# Patient Record
Sex: MALE | Race: WHITE | Employment: OTHER | URBAN - METROPOLITAN AREA
[De-identification: names, ages, dates, MRNs, and addresses within clinical notes are randomized per-mention and may not be internally consistent; named-entity substitution may affect disease eponyms.]

---

## 2020-09-23 ENCOUNTER — HOSPITAL ENCOUNTER (INPATIENT)
Age: 70
LOS: 1 days | Discharge: NURSING, REHAB OR LONG-TERM CARE | DRG: 066 | End: 2020-09-25
Attending: EMERGENCY MEDICINE | Admitting: FAMILY MEDICINE
Payer: MEDICARE

## 2020-09-23 ENCOUNTER — APPOINTMENT (OUTPATIENT)
Dept: CT IMAGING | Age: 70
DRG: 066 | End: 2020-09-23
Payer: MEDICARE

## 2020-09-23 LAB
ALBUMIN SERPL-MCNC: 3.5 G/DL (ref 3.5–5.2)
ALP BLD-CCNC: 113 U/L (ref 40–129)
ALT SERPL-CCNC: 19 U/L (ref 0–40)
ANION GAP SERPL CALCULATED.3IONS-SCNC: 9 MMOL/L (ref 7–16)
AST SERPL-CCNC: 14 U/L (ref 0–39)
BASOPHILS ABSOLUTE: 0.07 E9/L (ref 0–0.2)
BASOPHILS RELATIVE PERCENT: 0.9 % (ref 0–2)
BILIRUB SERPL-MCNC: 0.6 MG/DL (ref 0–1.2)
BUN BLDV-MCNC: 27 MG/DL (ref 8–23)
CALCIUM SERPL-MCNC: 8.9 MG/DL (ref 8.6–10.2)
CHLORIDE BLD-SCNC: 104 MMOL/L (ref 98–107)
CO2: 27 MMOL/L (ref 22–29)
CREAT SERPL-MCNC: 1.1 MG/DL (ref 0.7–1.2)
EKG ATRIAL RATE: 57 BPM
EKG P AXIS: 65 DEGREES
EKG P-R INTERVAL: 220 MS
EKG Q-T INTERVAL: 454 MS
EKG QRS DURATION: 150 MS
EKG QTC CALCULATION (BAZETT): 441 MS
EKG R AXIS: -43 DEGREES
EKG T AXIS: 1 DEGREES
EKG VENTRICULAR RATE: 57 BPM
EOSINOPHILS ABSOLUTE: 0.2 E9/L (ref 0.05–0.5)
EOSINOPHILS RELATIVE PERCENT: 2.4 % (ref 0–6)
GFR AFRICAN AMERICAN: >60
GFR NON-AFRICAN AMERICAN: >60 ML/MIN/1.73
GLUCOSE BLD-MCNC: 144 MG/DL (ref 74–99)
HCT VFR BLD CALC: 39.2 % (ref 37–54)
HEMOGLOBIN: 12.1 G/DL (ref 12.5–16.5)
IMMATURE GRANULOCYTES #: 0.03 E9/L
IMMATURE GRANULOCYTES %: 0.4 % (ref 0–5)
LYMPHOCYTES ABSOLUTE: 1.71 E9/L (ref 1.5–4)
LYMPHOCYTES RELATIVE PERCENT: 20.9 % (ref 20–42)
MCH RBC QN AUTO: 26.3 PG (ref 26–35)
MCHC RBC AUTO-ENTMCNC: 30.9 % (ref 32–34.5)
MCV RBC AUTO: 85.2 FL (ref 80–99.9)
METER GLUCOSE: 189 MG/DL (ref 74–99)
METER GLUCOSE: 273 MG/DL (ref 74–99)
MONOCYTES ABSOLUTE: 0.64 E9/L (ref 0.1–0.95)
MONOCYTES RELATIVE PERCENT: 7.8 % (ref 2–12)
NEUTROPHILS ABSOLUTE: 5.53 E9/L (ref 1.8–7.3)
NEUTROPHILS RELATIVE PERCENT: 67.6 % (ref 43–80)
PDW BLD-RTO: 14.9 FL (ref 11.5–15)
PLATELET # BLD: 222 E9/L (ref 130–450)
PMV BLD AUTO: 10.3 FL (ref 7–12)
POTASSIUM SERPL-SCNC: 5.1 MMOL/L (ref 3.5–5)
RBC # BLD: 4.6 E12/L (ref 3.8–5.8)
SODIUM BLD-SCNC: 140 MMOL/L (ref 132–146)
TOTAL PROTEIN: 6.2 G/DL (ref 6.4–8.3)
TROPONIN: <0.01 NG/ML (ref 0–0.03)
WBC # BLD: 8.2 E9/L (ref 4.5–11.5)

## 2020-09-23 PROCEDURE — 99284 EMERGENCY DEPT VISIT MOD MDM: CPT

## 2020-09-23 PROCEDURE — 70450 CT HEAD/BRAIN W/O DYE: CPT

## 2020-09-23 PROCEDURE — 85025 COMPLETE CBC W/AUTO DIFF WBC: CPT

## 2020-09-23 PROCEDURE — G0378 HOSPITAL OBSERVATION PER HR: HCPCS

## 2020-09-23 PROCEDURE — 6370000000 HC RX 637 (ALT 250 FOR IP): Performed by: FAMILY MEDICINE

## 2020-09-23 PROCEDURE — 99285 EMERGENCY DEPT VISIT HI MDM: CPT

## 2020-09-23 PROCEDURE — 80053 COMPREHEN METABOLIC PANEL: CPT

## 2020-09-23 PROCEDURE — 82962 GLUCOSE BLOOD TEST: CPT

## 2020-09-23 PROCEDURE — 84484 ASSAY OF TROPONIN QUANT: CPT

## 2020-09-23 PROCEDURE — 93005 ELECTROCARDIOGRAM TRACING: CPT | Performed by: STUDENT IN AN ORGANIZED HEALTH CARE EDUCATION/TRAINING PROGRAM

## 2020-09-23 PROCEDURE — 6370000000 HC RX 637 (ALT 250 FOR IP): Performed by: STUDENT IN AN ORGANIZED HEALTH CARE EDUCATION/TRAINING PROGRAM

## 2020-09-23 RX ORDER — POLYVINYL ALCOHOL 14 MG/ML
1 SOLUTION/ DROPS OPHTHALMIC PRN
COMMUNITY
End: 2021-08-10

## 2020-09-23 RX ORDER — DEXTROSE MONOHYDRATE 25 G/50ML
12.5 INJECTION, SOLUTION INTRAVENOUS PRN
Status: DISCONTINUED | OUTPATIENT
Start: 2020-09-23 | End: 2020-09-25 | Stop reason: HOSPADM

## 2020-09-23 RX ORDER — ISOSORBIDE MONONITRATE 60 MG/1
60 TABLET, EXTENDED RELEASE ORAL DAILY
Status: DISCONTINUED | OUTPATIENT
Start: 2020-09-23 | End: 2020-09-25 | Stop reason: HOSPADM

## 2020-09-23 RX ORDER — VITS A,C,E/LUTEIN/MINERALS 300MCG-200
1 TABLET ORAL DAILY
Status: DISCONTINUED | OUTPATIENT
Start: 2020-09-23 | End: 2020-09-25 | Stop reason: HOSPADM

## 2020-09-23 RX ORDER — MONTELUKAST SODIUM 10 MG/1
10 TABLET ORAL NIGHTLY
Status: DISCONTINUED | OUTPATIENT
Start: 2020-09-23 | End: 2020-09-25 | Stop reason: HOSPADM

## 2020-09-23 RX ORDER — LISINOPRIL 10 MG/1
10 TABLET ORAL DAILY
Status: ON HOLD | COMMUNITY
End: 2020-12-15 | Stop reason: HOSPADM

## 2020-09-23 RX ORDER — DEXTROSE MONOHYDRATE 50 MG/ML
100 INJECTION, SOLUTION INTRAVENOUS PRN
Status: DISCONTINUED | OUTPATIENT
Start: 2020-09-23 | End: 2020-09-25 | Stop reason: HOSPADM

## 2020-09-23 RX ORDER — ERGOCALCIFEROL 1.25 MG/1
50000 CAPSULE ORAL
Status: DISCONTINUED | OUTPATIENT
Start: 2020-09-24 | End: 2020-09-25 | Stop reason: HOSPADM

## 2020-09-23 RX ORDER — CITALOPRAM 10 MG/1
10 TABLET ORAL DAILY
COMMUNITY
End: 2021-08-10

## 2020-09-23 RX ORDER — ATORVASTATIN CALCIUM 20 MG/1
20 TABLET, FILM COATED ORAL NIGHTLY
Status: DISCONTINUED | OUTPATIENT
Start: 2020-09-23 | End: 2020-09-25 | Stop reason: HOSPADM

## 2020-09-23 RX ORDER — INSULIN GLARGINE 100 [IU]/ML
43 INJECTION, SOLUTION SUBCUTANEOUS NIGHTLY
Status: ON HOLD | COMMUNITY
End: 2021-03-24 | Stop reason: HOSPADM

## 2020-09-23 RX ORDER — LEVOTHYROXINE SODIUM 175 UG/1
175 TABLET ORAL DAILY
Status: DISCONTINUED | OUTPATIENT
Start: 2020-09-23 | End: 2020-09-25 | Stop reason: HOSPADM

## 2020-09-23 RX ORDER — NICOTINE POLACRILEX 4 MG
15 LOZENGE BUCCAL PRN
Status: DISCONTINUED | OUTPATIENT
Start: 2020-09-23 | End: 2020-09-25 | Stop reason: HOSPADM

## 2020-09-23 RX ORDER — ACETAMINOPHEN 325 MG/1
650 TABLET ORAL EVERY 4 HOURS PRN
Status: DISCONTINUED | OUTPATIENT
Start: 2020-09-23 | End: 2020-09-25 | Stop reason: HOSPADM

## 2020-09-23 RX ORDER — PREDNISONE 20 MG/1
60 TABLET ORAL ONCE
Status: COMPLETED | OUTPATIENT
Start: 2020-09-23 | End: 2020-09-23

## 2020-09-23 RX ORDER — FAMOTIDINE 20 MG/1
20 TABLET, FILM COATED ORAL 2 TIMES DAILY
Status: ON HOLD | COMMUNITY
End: 2020-12-07

## 2020-09-23 RX ORDER — PANTOPRAZOLE SODIUM 40 MG/1
40 TABLET, DELAYED RELEASE ORAL
Status: DISCONTINUED | OUTPATIENT
Start: 2020-09-24 | End: 2020-09-25 | Stop reason: HOSPADM

## 2020-09-23 RX ORDER — CARVEDILOL 6.25 MG/1
6.25 TABLET ORAL 2 TIMES DAILY WITH MEALS
Status: DISCONTINUED | OUTPATIENT
Start: 2020-09-23 | End: 2020-09-25 | Stop reason: HOSPADM

## 2020-09-23 RX ORDER — OXYCODONE HYDROCHLORIDE AND ACETAMINOPHEN 5; 325 MG/1; MG/1
1 TABLET ORAL EVERY 4 HOURS PRN
Status: DISCONTINUED | OUTPATIENT
Start: 2020-09-23 | End: 2020-09-25 | Stop reason: HOSPADM

## 2020-09-23 RX ADMIN — ISOSORBIDE MONONITRATE 60 MG: 60 TABLET ORAL at 18:20

## 2020-09-23 RX ADMIN — CYCLOPENTOLATE HYDROCHLORIDE 1 TABLET: 10 SOLUTION/ DROPS OPHTHALMIC at 18:21

## 2020-09-23 RX ADMIN — PREDNISONE 60 MG: 20 TABLET ORAL at 12:59

## 2020-09-23 RX ADMIN — ASPIRIN 325 MG: 325 TABLET, COATED ORAL at 18:21

## 2020-09-23 RX ADMIN — MONTELUKAST SODIUM 10 MG: 10 TABLET, COATED ORAL at 21:18

## 2020-09-23 RX ADMIN — LEVOTHYROXINE SODIUM 175 MCG: 0.17 TABLET ORAL at 18:20

## 2020-09-23 RX ADMIN — INSULIN LISPRO 1 UNITS: 100 INJECTION, SOLUTION INTRAVENOUS; SUBCUTANEOUS at 18:26

## 2020-09-23 RX ADMIN — SERTRALINE 75 MG: 50 TABLET, FILM COATED ORAL at 18:20

## 2020-09-23 RX ADMIN — ATORVASTATIN CALCIUM 20 MG: 20 TABLET, FILM COATED ORAL at 21:22

## 2020-09-23 RX ADMIN — CARVEDILOL 6.25 MG: 6.25 TABLET, FILM COATED ORAL at 18:20

## 2020-09-23 RX ADMIN — INSULIN LISPRO 2 UNITS: 100 INJECTION, SOLUTION INTRAVENOUS; SUBCUTANEOUS at 21:18

## 2020-09-23 ASSESSMENT — ENCOUNTER SYMPTOMS
EYE PAIN: 0
FACIAL SWELLING: 0
COUGH: 0
EYE REDNESS: 0
DIARRHEA: 0
SHORTNESS OF BREATH: 0
VOMITING: 0
SORE THROAT: 0
ABDOMINAL PAIN: 0
BACK PAIN: 0
NAUSEA: 0
CHEST TIGHTNESS: 0
PHOTOPHOBIA: 0
WHEEZING: 0

## 2020-09-23 ASSESSMENT — PAIN SCALES - GENERAL: PAINLEVEL_OUTOF10: 0

## 2020-09-23 NOTE — PROGRESS NOTES
Neurology consult sent to Dr. Verona Keen via perfect serve. Physician added to care team at this time.

## 2020-09-23 NOTE — ED PROVIDER NOTES
Patient is a 64-year-old male with a history of CAD, hyperlipidemia, COPD, diabetes who presents the emergency department for evaluation of right-sided facial droop. Facial droop began 2 days ago. Came on suddenly. Nothing seems to make it better or worse. No medications taken prior to arrival and has not been seen by anyone for it. Patient states that it is been constant and moderate in severity. He states it feels somewhat similar to Bell's palsy had 10 years ago. He denies difficulty finding words, headache, change in vision, slurred speech, chest pain, shortness of breath, nausea, vomiting, abdominal pain, weakness any tremors, numbness or tingling extremities. The history is provided by the patient. Review of Systems   Constitutional: Negative for chills, diaphoresis, fatigue and fever. HENT: Negative for facial swelling and sore throat. Eyes: Negative for photophobia, pain, redness and visual disturbance. Respiratory: Negative for cough, chest tightness, shortness of breath and wheezing. Cardiovascular: Negative for chest pain. Gastrointestinal: Negative for abdominal pain, diarrhea, nausea and vomiting. Genitourinary: Negative for decreased urine volume, dysuria, flank pain, frequency and urgency. Musculoskeletal: Negative for arthralgias, back pain and myalgias. Skin: Negative for pallor, rash and wound. Neurological: Positive for facial asymmetry. Negative for dizziness, syncope, weakness, light-headedness and headaches. All other systems reviewed and are negative. Physical Exam  Vitals signs and nursing note reviewed. Constitutional:       General: He is not in acute distress. Appearance: He is not ill-appearing. HENT:      Head: Atraumatic. 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  He denies any trauma to his hand.      Mouth/Throat:      Mouth: Mucous membranes are moist.   Eyes:      Extraocular Movements: Extraocular movements intact. Pupils: Pupils are equal, round, and reactive to light. Cardiovascular:      Rate and Rhythm: Normal rate and regular rhythm. Pulses: Normal pulses. Heart sounds: Normal heart sounds. Pulmonary:      Effort: Pulmonary effort is normal. No respiratory distress. Breath sounds: Normal breath sounds. No wheezing or rhonchi. Abdominal:      General: Abdomen is flat. There is no distension. Palpations: Abdomen is soft. Tenderness: There is no abdominal tenderness. There is no guarding or rebound. Musculoskeletal:         General: No swelling or tenderness. Skin:     General: Skin is warm and dry. Neurological:      Mental Status: He is alert and oriented to person, place, and time. Cranial Nerves: Cranial nerve deficit (Right-sided facial droop and inability to close the right eye.) present. Sensory: No sensory deficit. Motor: No weakness. Coordination: Coordination normal.          Procedures     MDM  Number of Diagnoses or Management Options  Abnormal CT of the head:   Bell's palsy:   Diagnosis management comments: Patient is a 41-year-old male that presents emergency room for evaluation of facial droop. Approximately no apparent distress on exam.  He does have right-sided facial droop as well as inability to close the right eye. Mild wrinkling of the forehead on eyebrow raise on the right. This is likely Bell's palsy however due to that finding CT scan of the head was performed and blood work was performed. Blood work was unremarkable. CT scan of the head showed hypodensity in the occipital region which would not explain the facial droop. They did recommend MRI to further evaluate of it. Spoke with hospitalist who agreed to accept patient. Patient was given prednisone for likely Bell's palsy. He remained hemodynamically stable throughout stay in the emergency room.             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 Past Surgical History:  has a past surgical history that includes Cardiac surgery; Coronary angioplasty with stent (12/2011); knee surgery; other surgical history (Left, 2/11/2015); other surgical history (3/2/15); other surgical history (Left, 3/2/2015); and eye surgery (Right, 8-). Social History:  reports that he has been smoking cigarettes. He has a 22.00 pack-year smoking history. He has never used smokeless tobacco. He reports that he does not drink alcohol or use drugs. Family History: family history includes Cancer in his mother; Heart Disease in his father. The patients home medications have been reviewed. Allergies: Patient has no known allergies.     -------------------------------------------------- RESULTS -------------------------------------------------    LABS:  Results for orders placed or performed during the hospital encounter of 09/23/20   CBC Auto Differential   Result Value Ref Range    WBC 8.2 4.5 - 11.5 E9/L    RBC 4.60 3.80 - 5.80 E12/L    Hemoglobin 12.1 (L) 12.5 - 16.5 g/dL    Hematocrit 39.2 37.0 - 54.0 %    MCV 85.2 80.0 - 99.9 fL    MCH 26.3 26.0 - 35.0 pg    MCHC 30.9 (L) 32.0 - 34.5 %    RDW 14.9 11.5 - 15.0 fL    Platelets 539 659 - 746 E9/L    MPV 10.3 7.0 - 12.0 fL    Neutrophils % 67.6 43.0 - 80.0 %    Immature Granulocytes % 0.4 0.0 - 5.0 %    Lymphocytes % 20.9 20.0 - 42.0 %    Monocytes % 7.8 2.0 - 12.0 %    Eosinophils % 2.4 0.0 - 6.0 %    Basophils % 0.9 0.0 - 2.0 %    Neutrophils Absolute 5.53 1.80 - 7.30 E9/L    Immature Granulocytes # 0.03 E9/L    Lymphocytes Absolute 1.71 1.50 - 4.00 E9/L    Monocytes Absolute 0.64 0.10 - 0.95 E9/L    Eosinophils Absolute 0.20 0.05 - 0.50 E9/L    Basophils Absolute 0.07 0.00 - 0.20 E9/L   Comprehensive Metabolic Panel   Result Value Ref Range    Sodium 140 132 - 146 mmol/L    Potassium 5.1 (H) 3.5 - 5.0 mmol/L    Chloride 104 98 - 107 mmol/L    CO2 27 22 - 29 mmol/L    Anion Gap 9 7 - 16 mmol/L    Glucose 144 (H) 74 - 99 mg/dL    BUN 27 (H) 8 - 23 mg/dL    CREATININE 1.1 0.7 - 1.2 mg/dL    GFR Non-African American >60 >=60 mL/min/1.73    GFR African American >60     Calcium 8.9 8.6 - 10.2 mg/dL    Total Protein 6.2 (L) 6.4 - 8.3 g/dL    Alb 3.5 3.5 - 5.2 g/dL    Total Bilirubin 0.6 0.0 - 1.2 mg/dL    Alkaline Phosphatase 113 40 - 129 U/L    ALT 19 0 - 40 U/L    AST 14 0 - 39 U/L   Troponin   Result Value Ref Range    Troponin <0.01 0.00 - 0.03 ng/mL       RADIOLOGY:  CT HEAD WO CONTRAST   Final Result      Small hypodensity in the left occipital lobe may represent edema or   encephalomalacia. Further evaluation with MRI is recommended. EKG: This EKG is signed and interpreted by me. Rate: 57  Rhythm: Sinus  Interpretation: Sinus bradycardia with first-degree AV block and a right bundle branch block. Comparison: changes compared to previous EKG      ------------------------- NURSING NOTES AND VITALS REVIEWED ---------------------------  Date / Time Roomed:  9/23/2020 10:56 AM  ED Bed Assignment:  01/01    The nursing notes within the ED encounter and vital signs as below have been reviewed.      Patient Vitals for the past 24 hrs:   BP Temp Pulse Resp SpO2   09/23/20 1134 -- -- -- 19 98 %   09/23/20 1131 (!) 113/36 -- 60 -- --   09/23/20 1110 (!) 161/63 97.3 °F (36.3 °C) 60 17 99 %       Oxygen Saturation Interpretation: Normal    ------------------------------------------ PROGRESS NOTES ------------------------------------------  Re-evaluation(s):  Time: 1200  Patients symptoms show no change  Repeat physical examination is not changed    Counseling:  I have spoken with the patient and discussed todays results, in addition to providing specific details for the plan of care and counseling regarding the diagnosis and prognosis. Their questions are answered at this time and they are agreeable with the plan of admission.    --------------------------------- ADDITIONAL PROVIDER NOTES ---------------------------------  Consultations:  Time: 1310. Spoke with Dr. Izabel Schwartz. Discussed case. They will admit the patient. This patient's ED course included: a personal history and physicial examination, re-evaluation prior to disposition, multiple bedside re-evaluations, IV medications, cardiac monitoring and continuous pulse oximetry    This patient has remained hemodynamically stable during their ED course. Diagnosis:  1. Bell's palsy    2. Abnormal CT of the head        Disposition:  Patient's disposition: Admit to med/surg floor  Patient's condition is stable.          Blanca Mcbride., DO  Resident  09/23/20 0919

## 2020-09-24 ENCOUNTER — APPOINTMENT (OUTPATIENT)
Dept: ULTRASOUND IMAGING | Age: 70
DRG: 066 | End: 2020-09-24
Payer: MEDICARE

## 2020-09-24 ENCOUNTER — APPOINTMENT (OUTPATIENT)
Dept: MRI IMAGING | Age: 70
DRG: 066 | End: 2020-09-24
Payer: MEDICARE

## 2020-09-24 PROBLEM — I63.9 STROKE (CEREBRUM) (HCC): Status: ACTIVE | Noted: 2020-09-24

## 2020-09-24 PROBLEM — R29.810 FACIAL DROOP: Status: ACTIVE | Noted: 2020-09-24

## 2020-09-24 PROBLEM — G51.0 BELL'S PALSY: Status: ACTIVE | Noted: 2020-09-24

## 2020-09-24 LAB
METER GLUCOSE: 174 MG/DL (ref 74–99)
METER GLUCOSE: 182 MG/DL (ref 74–99)
METER GLUCOSE: 184 MG/DL (ref 74–99)
METER GLUCOSE: 221 MG/DL (ref 74–99)
SARS-COV-2, NAAT: NOT DETECTED

## 2020-09-24 PROCEDURE — 2140000000 HC CCU INTERMEDIATE R&B

## 2020-09-24 PROCEDURE — 82962 GLUCOSE BLOOD TEST: CPT

## 2020-09-24 PROCEDURE — 6360000002 HC RX W HCPCS: Performed by: FAMILY MEDICINE

## 2020-09-24 PROCEDURE — 96374 THER/PROPH/DIAG INJ IV PUSH: CPT

## 2020-09-24 PROCEDURE — 97161 PT EVAL LOW COMPLEX 20 MIN: CPT

## 2020-09-24 PROCEDURE — 97530 THERAPEUTIC ACTIVITIES: CPT

## 2020-09-24 PROCEDURE — 97165 OT EVAL LOW COMPLEX 30 MIN: CPT

## 2020-09-24 PROCEDURE — 6370000000 HC RX 637 (ALT 250 FOR IP): Performed by: FAMILY MEDICINE

## 2020-09-24 PROCEDURE — U0002 COVID-19 LAB TEST NON-CDC: HCPCS

## 2020-09-24 PROCEDURE — 93880 EXTRACRANIAL BILAT STUDY: CPT

## 2020-09-24 PROCEDURE — 70551 MRI BRAIN STEM W/O DYE: CPT

## 2020-09-24 RX ORDER — LORAZEPAM 2 MG/ML
1 INJECTION INTRAMUSCULAR EVERY 4 HOURS PRN
Status: DISCONTINUED | OUTPATIENT
Start: 2020-09-24 | End: 2020-09-25 | Stop reason: HOSPADM

## 2020-09-24 RX ADMIN — SERTRALINE 75 MG: 50 TABLET, FILM COATED ORAL at 08:24

## 2020-09-24 RX ADMIN — LORAZEPAM 1 MG: 2 INJECTION INTRAMUSCULAR; INTRAVENOUS at 06:37

## 2020-09-24 RX ADMIN — INSULIN LISPRO 1 UNITS: 100 INJECTION, SOLUTION INTRAVENOUS; SUBCUTANEOUS at 08:23

## 2020-09-24 RX ADMIN — CARVEDILOL 6.25 MG: 6.25 TABLET, FILM COATED ORAL at 08:25

## 2020-09-24 RX ADMIN — INSULIN LISPRO 1 UNITS: 100 INJECTION, SOLUTION INTRAVENOUS; SUBCUTANEOUS at 21:56

## 2020-09-24 RX ADMIN — INSULIN LISPRO 10 UNITS: 100 INJECTION, SUSPENSION SUBCUTANEOUS at 22:06

## 2020-09-24 RX ADMIN — ISOSORBIDE MONONITRATE 60 MG: 60 TABLET ORAL at 08:25

## 2020-09-24 RX ADMIN — MONTELUKAST SODIUM 10 MG: 10 TABLET, COATED ORAL at 21:45

## 2020-09-24 RX ADMIN — ERGOCALCIFEROL 50000 UNITS: 1.25 CAPSULE ORAL at 08:25

## 2020-09-24 RX ADMIN — ASPIRIN 325 MG: 325 TABLET, COATED ORAL at 08:30

## 2020-09-24 RX ADMIN — ATORVASTATIN CALCIUM 20 MG: 20 TABLET, FILM COATED ORAL at 21:45

## 2020-09-24 RX ADMIN — PANTOPRAZOLE SODIUM 40 MG: 40 TABLET, DELAYED RELEASE ORAL at 08:25

## 2020-09-24 RX ADMIN — CYCLOPENTOLATE HYDROCHLORIDE 1 TABLET: 10 SOLUTION/ DROPS OPHTHALMIC at 08:31

## 2020-09-24 RX ADMIN — INSULIN LISPRO 10 UNITS: 100 INJECTION, SUSPENSION SUBCUTANEOUS at 08:22

## 2020-09-24 RX ADMIN — LEVOTHYROXINE SODIUM 175 MCG: 0.17 TABLET ORAL at 06:37

## 2020-09-24 ASSESSMENT — PAIN SCALES - GENERAL: PAINLEVEL_OUTOF10: 0

## 2020-09-24 ASSESSMENT — ENCOUNTER SYMPTOMS
ABDOMINAL PAIN: 0
SHORTNESS OF BREATH: 0

## 2020-09-24 NOTE — CARE COORDINATION
9/24 Transition of Care: Per Rodriguez Border patient is ltc at [facility] under his [facility] and is a bed hold. The plan is to return at discharge. Envelope completed and placed in soft chart.  Brady Buckley RN   [phone]

## 2020-09-24 NOTE — PROCEDURES
Pt measures 31 inches across shoulders and is too large to fit in our closed mri scanner. Rn notified.

## 2020-09-24 NOTE — CONSULTS
History Of Present Illness: Patient is a 68-year-old male with a history of CAD, hyperlipidemia, COPD, diabetes who presents the emergency department for evaluation of right-sided facial droop. Facial droop began 2 days ago. Came on suddenly. Nothing seems to make it better or worse. No medications taken prior to arrival and has not been seen by anyone for it. Patient states that it is been constant and moderate in severity. He states it feels somewhat similar to Bell's palsy had 10 years ago. He denies difficulty finding words, headache, change in vision, slurred speech, chest pain, shortness of breath, nausea, vomiting, abdominal pain, weakness any tremors, numbness or tingling extremities. As above per ed staff. Patient is interviewed and reports facial droop for approximately 1 week. The patient is a 65 y.o. male with significant past medical history of see below who presents with above. The patient has the following symptoms:    Change in level of consciousness: alert    New Weakness: yes    Numbness or Tingling: no    Difficulty Swallowing: no    Current Medications:   Scheduled Meds:   aspirin  325 mg Oral Daily    atorvastatin  20 mg Oral Nightly    carvedilol  6.25 mg Oral BID WC    insulin lispro protamine & lispro  10 Units Subcutaneous BID WC    isosorbide mononitrate  60 mg Oral Daily    levothyroxine  175 mcg Oral Daily    montelukast  10 mg Oral Nightly    ocuvite-lutein  1 tablet Oral Daily    pantoprazole  40 mg Oral QAM AC    sertraline  75 mg Oral Daily    vitamin D  50,000 Units Oral Once per day on Thu    enoxaparin  40 mg Subcutaneous Daily    insulin lispro  0-6 Units Subcutaneous TID WC    insulin lispro  0-3 Units Subcutaneous Nightly     Continuous Infusions:   dextrose       PRN Meds:LORazepam, acetaminophen, magnesium hydroxide, oxyCODONE-acetaminophen, glucose, dextrose, glucagon (rDNA), dextrose    Allergies:  Patient has no known allergies.     Social History:   TOBACCO:   reports that he has been smoking cigarettes. He has a 22.00 pack-year smoking history. He has never used smokeless tobacco.  ETOH:   reports no history of alcohol use. Past Medical History:        Diagnosis Date    Arthritis     Asthma     CAD (coronary artery disease) 2001    FREDA to RCA    [facility]     Diabetes [facility]     Marcus's gangrene     Hyperlipidemia     Hypothyroidism     DEMARCUS (obstructive sleep apnea)     no cpap or bipap     Pneumonia 2/5/2015       Past Surgical History:        Procedure Laterality Date    CARDIAC SURGERY      CORONARY ANGIOPLASTY WITH STENT PLACEMENT  12/2011    EYE SURGERY Right 8-    right eye cataract extraction with intraocular lens  implant    KNEE SURGERY      OTHER SURGICAL HISTORY Left 2/11/2015    excision and debridement left groin and scrotom    OTHER SURGICAL HISTORY  3/2/15    perineal wound check    OTHER SURGICAL HISTORY Left 3/2/2015    incision and drainage of scrotal abscesses and left fortino abscess by Dr Fidelia Muñiz         Outside reports reviewed: ER records, historical medical records, lab reports and radiology reports. Patient's medications, allergies, past medical, surgical, social and family histories were reviewed and updated as appropriate. Review of Systems  A comprehensive review of systems was negative except for:       Objective:     Neuro exam 118/59; p 66 t 98  General: normal orientation and alertness. Cranial nerve testing was normal. Except mild right peripheral facial weakness; he is able to completely cover sclera on right with eye closing; visual fields full to confrontation  Funduscopic eye exam revealed not testable. Motor exam: 4/5. Deep tendon reflexes were absent bilaterally. Plantar responses were flexor bilaterally. Cerebellar exam noted finger to nose without dysmetria.   Sensation was decreased in the lower

## 2020-09-24 NOTE — H&P
HISTORY AND PHYSICAL             Date: 9/24/2020        Patient Name: Mely Wilcox     YOB: 1950      Age:  65 y.o. Chief Complaint     Chief Complaint   Patient presents with    Facial Droop     hx of bells palsy, unknown LKW       History Obtained From   patient    History of Present Illness   Patient presented with two days of left sided facial droop. He denies any speech change or other weakness. Past Medical History     Past Medical History:   Diagnosis Date    Arthritis     Asthma     CAD (coronary artery disease) 2001    FREDA to RCA    [facility]     [facility]     Marcus's gangrene     Hyperlipidemia     Hypothyroidism     DEMARCUS (obstructive sleep apnea)     no cpap or bipap     Pneumonia 2/5/2015        Past Surgical History     Past Surgical History:   Procedure Laterality Date    CARDIAC SURGERY      CORONARY ANGIOPLASTY WITH STENT PLACEMENT  12/2011    EYE SURGERY Right 8-    right eye cataract extraction with intraocular lens  implant    KNEE SURGERY      OTHER SURGICAL HISTORY Left 2/11/2015    excision and debridement left groin and scrotom    OTHER SURGICAL HISTORY  3/2/15    perineal wound check    OTHER SURGICAL HISTORY Left 3/2/2015    incision and drainage of scrotal abscesses and left fortino abscess by Dr Lanette Martinez        Medications Prior to Admission     Prior to Admission medications    Medication Sig Start Date End Date Taking?  Authorizing Provider   polyvinyl alcohol (LIQUIFILM TEARS) 1.4 % ophthalmic solution Place 1 drop into the left eye as needed   Yes Historical Provider, MD   citalopram (CELEXA) 10 MG tablet Take 10 mg by mouth daily   Yes Historical Provider, MD   famotidine (PEPCID) 20 MG tablet Take 20 mg by mouth 2 times daily   Yes Historical Provider, MD   insulin glargine (LANTUS) 100 UNIT/ML injection vial Inject 43 Units into the skin nightly   Yes Historical Provider, MD per tablet Take 1 tablet by mouth daily. Historical Provider, MD   montelukast (SINGULAIR) 10 MG tablet Take 1 tablet by mouth nightly. 6/7/14   Alicia Collins MD   pantoprazole (PROTONIX) 40 MG tablet Take 1 tablet by mouth every morning (before breakfast). 6/7/14   Alicia Collins MD        Allergies   Patient has no known allergies. Social History     Social History     Tobacco History     Smoking Status  Current Every Day Smoker Smoking Frequency  0.5 packs/day for 44 years (22 pk yrs) Smoking Tobacco Type  Cigarettes    Smokeless Tobacco Use  Never Used          Alcohol History     Alcohol Use Status  No          Drug Use     Drug Use Status  No          Sexual Activity     Sexually Active  Not Currently                Family History     Family History   Problem Relation Age of Onset    Cancer Mother     Heart Disease Father        Review of Systems   Review of Systems   Constitutional: Positive for activity change. HENT: Negative for congestion. Respiratory: Negative for shortness of breath. Cardiovascular: Negative for chest pain. Gastrointestinal: Negative for abdominal pain. Genitourinary: Negative for difficulty urinating. Musculoskeletal: Negative for arthralgias. Neurological: Positive for facial asymmetry. Negative for dizziness and weakness. Hematological: Negative for adenopathy. Psychiatric/Behavioral: Negative for agitation. Physical Exam   BP (!) 158/80   Pulse 58   Temp 97.2 °F (36.2 °C) (Temporal)   Resp 16   Ht 5' 7\" (1.702 m)   Wt 255 lb (115.7 kg)   SpO2 99%   BMI 39.94 kg/m²     Physical Exam  HENT:      Head: Normocephalic. Mouth/Throat:      Mouth: Mucous membranes are moist.   Eyes:      Pupils: Pupils are equal, round, and reactive to light. Cardiovascular:      Rate and Rhythm: Normal rate and regular rhythm. Pulses: Normal pulses. Heart sounds: Normal heart sounds.    Pulmonary:      Effort: Pulmonary effort is normal. No respiratory distress. Breath sounds: Normal breath sounds. No wheezing. Abdominal:      General: Abdomen is flat. Bowel sounds are normal. There is no distension. Tenderness: There is no abdominal tenderness. Musculoskeletal: Normal range of motion. Skin:     General: Skin is warm and dry. Capillary Refill: Capillary refill takes less than 2 seconds. Neurological:      Mental Status: He is alert and oriented to person, place, and time.       Comments: Left sided facial droop   Psychiatric:         Mood and Affect: Mood normal.         Behavior: Behavior normal.         Labs      Recent Results (from the past 24 hour(s))   EKG 12 Lead    Collection Time: 09/23/20 11:25 AM   Result Value Ref Range    Ventricular Rate 57 BPM    Atrial Rate 57 BPM    P-R Interval 220 ms    QRS Duration 150 ms    Q-T Interval 454 ms    QTc Calculation (Bazett) 441 ms    P Axis 65 degrees    R Axis -43 degrees    T Axis 1 degrees   CBC Auto Differential    Collection Time: 09/23/20 11:32 AM   Result Value Ref Range    WBC 8.2 4.5 - 11.5 E9/L    RBC 4.60 3.80 - 5.80 E12/L    Hemoglobin 12.1 (L) 12.5 - 16.5 g/dL    Hematocrit 39.2 37.0 - 54.0 %    MCV 85.2 80.0 - 99.9 fL    MCH 26.3 26.0 - 35.0 pg    MCHC 30.9 (L) 32.0 - 34.5 %    RDW 14.9 11.5 - 15.0 fL    Platelets 730 925 - 924 E9/L    MPV 10.3 7.0 - 12.0 fL    Neutrophils % 67.6 43.0 - 80.0 %    Immature Granulocytes % 0.4 0.0 - 5.0 %    Lymphocytes % 20.9 20.0 - 42.0 %    Monocytes % 7.8 2.0 - 12.0 %    Eosinophils % 2.4 0.0 - 6.0 %    Basophils % 0.9 0.0 - 2.0 %    Neutrophils Absolute 5.53 1.80 - 7.30 E9/L    Immature Granulocytes # 0.03 E9/L    Lymphocytes Absolute 1.71 1.50 - 4.00 E9/L    Monocytes Absolute 0.64 0.10 - 0.95 E9/L    Eosinophils Absolute 0.20 0.05 - 0.50 E9/L    Basophils Absolute 0.07 0.00 - 0.20 E9/L   Comprehensive Metabolic Panel    Collection Time: 09/23/20 11:32 AM   Result Value Ref Range    Sodium 140 132 - 146 mmol/L    Potassium 5.1

## 2020-09-24 NOTE — DISCHARGE INSTR - COC
Continuity of Care Form    Patient Name: Paris Caballero   :  1950  MRN:  [id number]    Admit date:  2020  Discharge date:  ***    Code Status Order: Prior   Advance Directives:   [facility] Documentation     Date/Time Healthcare Directive Type of Healthcare Directive Copy in [address] Agent's Name Healthcare Agent's Phone Number    [phone]  Yes, patient has an advance directive for healthcare treatment  Health care treatment directive; Living will  No, copy requested from [facility] power of [facility] ([facility])  [phone]          Admitting Physician:  Lydia Escobar MD  PCP: Enoch Hendricks MD    Discharging Nurse: [facility] Unit/Room#: 1368/0910-W  Discharging Unit Phone Number: ***    Emergency Contact:   Extended Emergency Contact Information  Primary Emergency Contact: Bertha Box  Address: Padmini Pablo [address] [place] [address] Phone: [phone]  Mobile Phone: [phone]  Relation: Brother/Sister  Secondary Emergency Contact: [facility] Halina Coley  Address:  [address] Phone: [phone]  Relation: Brother/Sister    Past Surgical History:  Past Surgical History:   Procedure Laterality Date    CARDIAC SURGERY      CORONARY ANGIOPLASTY WITH STENT PLACEMENT  2011    EYE SURGERY Right 2015    right eye cataract extraction with intraocular lens  implant    KNEE SURGERY      OTHER SURGICAL HISTORY Left 2015    excision and debridement left groin and scrotom    OTHER SURGICAL HISTORY  3/2/15    perineal wound check    OTHER SURGICAL HISTORY Left 3/2/2015    incision and drainage of scrotal abscesses and left fortino abscess by Dr Prabha Frazier       Immunization History: There is no immunization history on file for this patient.     Active Problems:  Patient Active Problem List   Diagnosis Code    CAD (coronary artery disease) I25.10    Diabetes mellitus ([facility] [address] E11.9    Hyperlipidemia E78.5    COPD (chronic obstructive pulmonary disease) ([facility]) J44.9    DEMARCUS (obstructive sleep apnea) G47.33    Acute respiratory distress R06.03    Tobacco abuse Z72.0    Acute bronchitis J20.9    Gastritis K29.70    IDDM (insulin dependent diabetes mellitus) ([facility]) E11.9, Z79.4    ASHD (arteriosclerotic heart disease) I25.10    COPD (chronic obstructive pulmonary disease) ([facility]) J44.9    Cellulitis L03.90    Gall Bladder sludge K80.20    COPD (chronic obstructive pulmonary disease) ([facility]) J44.9    ASHD (arteriosclerotic heart disease) I25.10    IDDM (insulin dependent diabetes mellitus) ([facility]) E11.9, Z79.4    Pneumonia J18.9    CHF (congestive heart failure) ([facility]) I50.9    Hematuria R31.9    IDDM (insulin dependent diabetes mellitus) ([facility]) E11.9, Z79.4    ASHD (arteriosclerotic heart disease) I25.10    COPD (chronic obstructive pulmonary disease) ([facility]) J44.9    Hypoglycemia E16.2    ASHD (arteriosclerotic heart disease) I25.10    IDDM (insulin dependent diabetes mellitus) ([facility]) E11.9, Z79.4    Stroke (cerebrum) ([facility]) I63.9    Bell's palsy G51.0       Isolation/Infection:   Isolation          No Isolation        Patient Infection Status     Infection Onset Added Last Indicated Last Indicated By Review Planned Expiration Resolved Resolved By    None active    Resolved    ESBL (Extended Spectrum Beta Lactamase)  04/06/15 04/06/15 Kanika Mcclellan RN   08/18/15 Kanika Mcclellan RN    4/2/15 Klebsiella Pneumoniae urine          Nurse Assessment:  Last Vital Signs: BP (!) 118/59   Pulse 66   Temp 97.2 °F (36.2 °C) (Temporal)   Resp 16   Ht 5' 7\" (1.702 m)   Wt 255 lb (115.7 kg)   SpO2 99%   BMI 39.94 kg/m²     Last documented pain score (0-10 scale): Pain Level: 0  Last Weight:   Wt Readings from Last 1 Encounters:   09/23/20 255 lb (115.7 kg)     Mental Status:  alert and coherent    IV Access:  - None    Nursing Mobility/ADLs:  Walking   Independent  Transfer  Independent  Bathing  Assisted  Dressing  Independent  [address]  Assisted  Med Delivery   whole    Wound Care Documentation and Therapy:  Wound 03/25/15 Other (Comment) Perineum Left (Active)   Number of days: 2010       Wound 03/25/15 Other (Comment) Scrotum Left;Right;Mid (Active)   Number of days: 2010        Elimination:  Continence:   · Bowel: No  · Bladder: No  Urinary Catheter: None   Colostomy/Ileostomy/Ileal Conduit: No       Date of Last BM: 9/25/20      Intake/Output Summary (Last 24 hours) at 9/24/2020 1308  Last data filed at 9/24/2020 0800  Gross per 24 hour   Intake 180 ml   Output --   Net 180 ml     No intake/output data recorded. Safety Concerns:     None    Impairments/Disabilities:      Speech    Nutrition Therapy:  Current Nutrition Therapy:   - Oral Diet:  Carb Control 4 carbs/meal (1800kcals/day)    Routes of Feeding: Oral  Liquids: Thin Liquids  Daily Fluid Restriction: no  Last Modified Barium Swallow with Video (Video Swallowing Test): not done    Treatments at the Time of Hospital Discharge:   Respiratory Treatments:     Oxygen Therapy:  is not on home oxygen therapy.   Ventilator:    - No ventilator support    Rehab Therapies: {THERAPEUTIC INTERVENTION:[id number]}  Weight Bearing Status/Restrictions: No weight bearing restirctions  Other Medical Equipment (for information only, NOT a DME order):  cane  Other Treatments: ***    Patient's personal belongings (please select all that are sent with patient):  Dentures upper and lower    RN SIGNATURE:  Marylin Temple RN    CASE MANAGEMENT/SOCIAL WORK SECTION    Inpatient Status Date: ***    Readmission Risk Assessment Score:  Readmission Risk              Risk of Unplanned Readmission:        0           Discharging to Facility/ Agency   · [facility]: [facility]  · Address:  · Phone:  · Fax:    Dialysis Facility (if applicable)   · Name:  · Address:  · Dialysis Schedule:  · Phone:  · Fax:    / signature: Electronically signed by Milady Nuñez RN on 2020 at 1:11 PM  PHYSICIAN SECTION    Prognosis: Good    Condition at Discharge: Stable    Rehab Potential (if transferring to Rehab): Good    Recommended Labs or Other Treatments After Discharge: ***    Physician Certification: I certify the above information and transfer of Mike Almanzar  is necessary for the continuing treatment of the diagnosis listed and that he requires Go Yaya for less 30 days.      Update Admission H&P: No change in H&P    PHYSICIAN SIGNATURE:  {Esignature:[id number]}

## 2020-09-24 NOTE — PROGRESS NOTES
Occupational Therapy   Occupational Therapy Initial Assessment  Date: 2020   Patient Name: Roberto Levy  MRN: [id number]     : 1950  Room: 8211B    Referring Provider:  Arnel Jin MD    Modified [place] Scale (MRS)  Score     Description  0             No symptoms  1             No significant disability despite symptoms  2             Slight disability; able to look after own affairs  3             Moderate disability; able to ambulate without assist/ requires assist with ADLs  4             Moderate/Severe disability;requires assist to ambulate/assist with ADLs  5             Severe disability;bedridden/incontinent   6               Score:   3    Evaluating OT: Monica Norman OTR/L [id number]    AM-PAC Daily Activity Raw Score:     Recommended Adaptive Equipment:  na    Diagnosis: R facial droop    Pertinent Medical History:  Hx Bell's palsy, HTN, DM, COPD, CAD, DEMARCUS, hx R catarct, hx angio with stent  Precautions:  Falls      Home Living: Pt lives @ [facility]    Bathroom setup: walk in shower  Equipment owned: sc & seat in shower    Prior Level of Function:independent with ADLs except aides assist with showers; ambulated with a sc   Driving: no  Occupation: retired     Pain Level: Pt c/o 10/10 back pain & pt RN notified to see if pain meds could be given    Cognition: A&O /4; Follows 2-3 step directions    Memory:  G   Sequencing:  G   Problem solving:  G   Judgement/safety:  G     Functional Assessment:   Initial Eval Status  Date: 20 Treatment Status  Date: Short Term Goals = Long Term Goals  Treatment frequency: 1-4x/wk; PRN   Feeding independent      Grooming Sup seated   Mod I standing    UB Dressing Sup to don gown   independent   LB Dressing Min A  Mod I   Bathing Min A  Mod I    Toileting NT  independent   Bed Mobility  Supine <> sit: Sup     Supine <> sit: independent   Functional Transfers  SBA using a recliner to varying surfaces- commode, sofa & chair with arm rests  Mod I sc Functional Mobility SBA  No device household distance in his room & on the unit using a sc  Mod I sc   Balance Sitting: independent  Standing: Sup  Standing: independent   Activity Tolerance F     Light activity due to back pain. Pt nurse notified of pain & RN to provide meds if available  G-   Visual/  Perceptual   WFL- glasses                  Hand dominance: right      Strength ROM Additional Info:    BUE  WFL WFL Good  and Good  FMC/dexterity noted during ADL tasks             Hearing: WFL  Sensation: WFL  Tone: wfl  Edema: None noted                            Comments: Upon arrival, patient supine in bed and agreeable to OT Session. At end of session, patient supine in bed with call light and phone within reach, all lines and tubes intact. Pt would benefit from continued skilled OT to increase safety,  independence and quality of life. Pt pleasant & cooperative throughout the OT session       Treatment: OT treatment provided this date includes:  Facilitation of unsupported sitting balance (addressing posture, weight shifting and dynamic reaching to prep for ADL's), functional transfers (education/cues for safety/hand placement), standing tolerance tasks w/ no device standing @ sink performing grooming tasks (addressing posture, balance and activity tolerance while incorporating light functional reaching) and functional ambulation tasks with no device in her room (to/from bathroom ; cuing on posture, and safety. Therapist facilitated self-care retraining: UB/LB self-care tasks (gown, socks), toileting task (including hygiene) and standing grooming tasks while educating pt on modified techniques, posture, safety and energy conservation techniques.  Skilled monitoring of HR, O2 sats and pts response to treatment (Pt on room air.)    Eval Complexity: min  Profile and History- min (extensive chart review)  Assessment of Occupational Performance and Identification of Deficits- min  Clinical Decision Making- min     (Evaluation time includes thorough review of current medical information, gathering information on past medical history/social history and prior level of function, completion of standardized testing/informal observation of tasks, assessment of data, and development of POC/Goals.)      Assessment of current deficits   Functional mobility [x]  ADLs [x] Strength [x]  Cognition []  Functional transfers  [x] IADLs [x] Safety Awareness []  Endurance [x]  Fine Motor Coordination [] Balance [x] Vision/perception [] Sensation []   Gross Motor Coordination [] ROM [x] Delirium []                  Motor Control []    Plan of Care:  OT 1-3x/week for 5-7 days PRN   [x] ADL retraining/AE recommendations (modified techniques)  [x] Energy Conservation Techniques/Strategies      [] Neuromuscular Re-Education      [x] Functional Transfer Training         [x] Functional Mobility Training          [] Cognitive Re-Training          [] Splinting/Positioning Needs           [x] Therapeutic Activity   [x]Therapeutic Exercise   [] Visual/Perceptual   [] Delirium Prevention/Treatment   [] Positioning to Improve Functional [place], Safety, and Skin Integrity   [x] Patient and/or Family Education to Increase Safety and Functional [place]   [] Other:     Rehab Potential: Good for established goals    LTG: maximize independence with ADLs     Patient / Family Goal:  \"I like to walk around facility. \"     Patient and/or family were instructed diagnosis, prognosis/goals and plan of care. Demonstrated P+ understanding. [] Malnutrition indicators have been identified and nursing has been notified to ensure a dietitian consult is ordered.      min Evaluation    Time In: 947  Time Out: 1010  Total Treatment Time: 23 minutes     Min Units   OT Eval Low 97165 X 1   OT Eval Medium 66062     OT Eval High 05711     OT Re-Eval [id number]     Therapeutic Ex 39078     Therapeutic Activities 42802 15 1   ADL/Self Care 78567     Orthotic Management 98724     Neuro Re-Ed 17453     Non-Billable Time     TOTAL TIMED TREATMENT       Emanuel Carrillo, OTR/L [id number]

## 2020-09-24 NOTE — PROGRESS NOTES
Dr. Arlin Osborne notified about the patient being claustrophobic and is requesting something to help with MRI.

## 2020-09-24 NOTE — PROGRESS NOTES
Physical Therapy  Physical Therapy Initial Assessment     Name: Odalis Penn  : 1950  MRN: [id number]    Referring Provider: Danish Wade MD    Date of Service: 2020    Evaluating PT: Olivecheyanne Echeverria, PT, DPT, [id number]    Room #: 3352/3714-H  Diagnosis: Abnormal CT of brain  PMHx/PSHx: DM, HLD, CAD, hypothyroidism, COPD, asthma, OA, Bell's palsy   Precautions: Fall risk    SUBJECTIVE:    Pt is from an ECF. Pt ambulated with [employer] [facility] Mod Independent prior to admission. OBJECTIVE:   Initial Evaluation  Date: 20 Treatment Date: Short Term/ Long Term   Goals   AM-PAC 6 Clicks      Was pt agreeable to Eval/treatment? Yes     Does pt have pain? No current complaints of pain     Bed Mobility  Rolling: SBA  Supine to sit: SBA  Sit to supine: SBA  Scooting: SBA to EOB  Rolling: Independent   Supine to sit: Independent   Sit to supine: Independent   Scooting: Independent    Transfers Sit to stand: SBA  Stand to sit: SBA  Stand pivot: SBA with SPC  Sit to stand: Independent   Stand to sit: Independent   Stand pivot: Mod Independent with SPC   Ambulation   75 feet with SPC with SBA  200 feet with SPC Mod Independent    Stair negotiation: ascended and descended NT  NA   ROM BUE: WFL  BLE: WFL     Strength BUE: WFL  BLE: 4/5 grossly     Balance Sitting EOB: Supervision  Dynamic Standing: SBA with SPC  Sitting EOB: Independent   Dynamic Standing: Mod Independent with SPC     Pt is A & O x: 4 to person, place, month/year, and situation. Sensation: Pt denies numbness and tingling of extremities. Edema: Unremarkable. Therapeutic Exercises:  5x STS from chair with SBA    Patient education  Pt educated on PT role in acute care setting. Patient response to education:   Pt verbalized understanding Pt demonstrated skill Pt requires further education in this area   Yes NA No     ASSESSMENT:    Comments:   Pt was supine in bed upon room entry, agreeable to PT evaluation. Pt has flat affect.  Pt ambulates with slow gait speed with mild unsteadiness with cane. Pt ambulated short distance in hallway. Pt tended to look into other pt's rooms as he ambulated by and had mild unsteadiness with head turns. Steadiness improved with forward gaze. Pt was mildly SOB with activity but O2 sat was 95% on RA. Pt completed 5x STS exercise as noted above. Pt requested to return to bed following activity. Pt was left supine in bed with all needs met at conclusion of session. Treatment:  Patient practiced and was instructed in the following treatment:     Therapeutic activities: Pt completed all therapeutic activities noted above. Pt was cued for hand placement during sit <> stand transfers. Pt completed multiple transfers from surfaces of varying heights (EOB x2, chair x5). Pt ambulated with SPC as he was cued to slow down and keeps eyes focused forward to improve steadiness. Pt's/family goals:   1. None stated. Patient and or family understand(s) diagnosis, prognosis, and plan of care. Yes. PLAN:    Current Treatment Recommendations   [] Strengthening     [] ROM   [x] Balance Training   [x] Endurance Training   [x] Transfer Training   [x] Gait Training   [] Stair Training   [] Positioning   [x] Safety and Education Training   [x] Patient/Caregiver Education   [] HEP  [] Other     PT care will be provided in accordance with the objectives noted above. Exercises and functional mobility practice will be used as well as appropriate assistive devices or modalities to obtain goals. Patient and family education will also be administered as needed. Frequency of treatments: 2-5x/week x 2-3 days.     Time in: 0920  Time out: 0940    Total Treatment Time 15 minutes     Evaluation Time includes thorough review of current medical information, gathering information on past medical history/social history and prior level of function, completion of standardized testing/informal observation of tasks, assessment of data and education on

## 2020-09-25 VITALS
DIASTOLIC BLOOD PRESSURE: 61 MMHG | SYSTOLIC BLOOD PRESSURE: 138 MMHG | OXYGEN SATURATION: 97 % | WEIGHT: 255 LBS | HEIGHT: 67 IN | HEART RATE: 62 BPM | RESPIRATION RATE: 16 BRPM | BODY MASS INDEX: 40.02 KG/M2 | TEMPERATURE: 97 F

## 2020-09-25 LAB — METER GLUCOSE: 159 MG/DL (ref 74–99)

## 2020-09-25 PROCEDURE — 82962 GLUCOSE BLOOD TEST: CPT

## 2020-09-25 PROCEDURE — 6370000000 HC RX 637 (ALT 250 FOR IP): Performed by: FAMILY MEDICINE

## 2020-09-25 RX ADMIN — LEVOTHYROXINE SODIUM 175 MCG: 0.17 TABLET ORAL at 05:43

## 2020-09-25 RX ADMIN — INSULIN LISPRO 1 UNITS: 100 INJECTION, SOLUTION INTRAVENOUS; SUBCUTANEOUS at 09:38

## 2020-09-25 RX ADMIN — PANTOPRAZOLE SODIUM 40 MG: 40 TABLET, DELAYED RELEASE ORAL at 09:39

## 2020-09-25 RX ADMIN — INSULIN LISPRO 10 UNITS: 100 INJECTION, SUSPENSION SUBCUTANEOUS at 09:39

## 2020-09-25 ASSESSMENT — PAIN SCALES - GENERAL: PAINLEVEL_OUTOF10: 0

## 2020-09-25 NOTE — DISCHARGE SUMMARY
Discharge Summary    Date: 9/25/2020  Patient Name: Laurence Lopez YOB: 1950 Age: 65 y.o. Admit Date: 9/23/2020  Discharge Date: 9/25/2020  Discharge Condition: Stable    Admission Diagnosis  Abnormal CT of brain (R90.89); Facial droop (R29.810); Facial droop (R29.810)     Discharge Diagnosis  Principal Problem: Bell's palsyActive Problems: Stroke (cerebrum) (HCC) Facial droopResolved Problems: * No resolved hospital problems. [facility] Stay  Narrative of Hospital Course:  Patient was admitted for what initially appeared like Sylvester Palsy. However Neurology confirmed on MRI acute CVA. Patient on asa, statin. Carotids had significant disease, so will be set up with vascular surgery outpatient. Consultants:  IP CONSULT TO INTERNAL MEDICINEIP CONSULT TO NEUROLOGY    Surgeries/procedures Performed:       Treatments:           Discharge Plan/Disposition:  Home    Hospital/Incidental Findings Requiring Follow Up:    Patient Instructions:    Diet: Diabetic Diet    Activity:Activity as Tolerated  For number of days (if applicable): Other Instructions:    Provider Follow-Up:   No follow-ups on file.      Significant Diagnostic Studies:    Recent Labs:  Admission on 09/23/2020WBC                                           Date: 09/23/2020Value: 8.2         Ref range: 4.5 - 11.5 E9/L    Status: FinalRBC                                           Date: 09/23/2020Value: 4.60        Ref range: 3.80 - 5.80 E12/L  Status: FinalHemoglobin                                    Date: 09/23/2020Value: 12.1*       Ref range: 12.5 - 16.5 g/dL   Status: FinalHematocrit                                    Date: 09/23/2020Value: 39.2        Ref range: 37.0 - 54.0 %      Status: FinalMCV                                           Date: 09/23/2020Value: 85.2        Ref range: 80.0 - 99.9 fL     Status: [address] [place]                                           Date: 09/23/2020Value: 26.3        Ref range: 26.0 - 35.0 pg     Status: [address]                                          Date: 09/23/2020Value: 30.9*       Ref range: 32.0 - 34.5 %      Status: FinalRDW                                           Date: 09/23/2020Value: 14.9        Ref range: 11.5 - 15.0 fL     Status: FinalPlatelets                                     Date: 09/23/2020Value: 222         Ref range: 130 - 450 E9/L     Status: FinalMPV                                           Date: 09/23/2020Value: 10.3        Ref range: 7.0 - 12.0 fL      Status: FinalNeutrophils %                                 Date: 09/23/2020Value: 67.6        Ref range: 43.0 - 80.0 %      Status: FinalImmature Granulocytes %                       Date: 09/23/2020Value: 0.4         Ref range: 0.0 - 5.0 %        Status: FinalLymphocytes %                                 Date: 09/23/2020Value: 20.9        Ref range: 20.0 - 42.0 %      Status: FinalMonocytes %                                   Date: 09/23/2020Value: 7.8         Ref range: 2.0 - 12.0 %       Status: FinalEosinophils %                                 Date: 09/23/2020Value: 2.4         Ref range: 0.0 - 6.0 %        Status: FinalBasophils %                                   Date: 09/23/2020Value: 0.9         Ref range: 0.0 - 2.0 %        Status: FinalNeutrophils Absolute                          Date: 09/23/2020Value: 5.53        Ref range: 1.80 - 7.30 E9/L   Status: FinalImmature Granulocytes #                       Date: 09/23/2020Value: 0.03        Ref range: E9/L               Status: FinalLymphocytes Absolute                          Date: 09/23/2020Value: 1.71        Ref range: 1.50 - 4.00 E9/L   Status: FinalMonocytes Absolute                            Date: 09/23/2020Value: 0.64        Ref range: 0.10 - 0.95 E9/L   Status: FinalEosinophils Absolute                          Date: 09/23/2020Value: 0.20        Ref range: 0.05 - 0.50 E9/L   Status: FinalBasophils Absolute                            Date: 09/23/2020Value: 0.07        Ref range: 0.00 - 0.20 E9/L   Status: FinalSodium                                        Date: 09/23/2020Value: 140         Ref range: 132 - 146 mmol/L   Status: FinalPotassium                                     Date: 09/23/2020Value: 5.1*        Ref range: 3.5 - 5.0 mmol/L   Status: FinalChloride                                      Date: 09/23/2020Value: 104         Ref range: 98 - 107 mmol/L    Status: FinalCO2                                           Date: 09/23/2020Value: 27          Ref range: 22 - 29 mmol/L     Status: FinalAnion Gap                                     Date: 09/23/2020Value: 9           Ref range: 7 - 16 mmol/L      Status: FinalGlucose                                       Date: 09/23/2020Value: 144*        Ref range: 74 - 99 mg/dL      Status: FinalBUN                                           Date: 09/23/2020Value: 27*         Ref range: 8 - 23 mg/dL       Status: FinalCREATININE                                    Date: 09/23/2020Value: 1.1         Ref range: 0.7 - 1.2 mg/dL    Status: FinalGFR Non-                      Date: 09/23/2020Value: >60         Ref range: >=60 mL/min/1.73   Status: Final              Comment: Chronic Kidney Disease: less than 60 ml/min/1.73 sq.m. Kidney Failure: less than 15 ml/min/1.73 sq. m. Results valid for patients 18 years and older. GFR                           Date: 09/23/2020Value: >60           Status: FinalCalcium                                       Date: 09/23/2020Value: 8.9         Ref range: 8.6 - 10.2 mg/dL   Status: FinalTotal Protein                                 Date: 09/23/2020Value: 6.2*        Ref range: 6.4 - 8.3 g/dL     Status: FinalAlb                                           Date: 09/23/2020Value: 3.5         Ref range: 3.5 - 5.2 g/dL     Status: FinalTotal Bilirubin                               Date: 09/23/2020Value: 0.6         Ref range: 0.0 - 1.2 mg/dL    Status: FinalAlkaline Phosphatase Date: 09/23/2020Value: 113         Ref range: 40 - 129 U/L       Status: FinalALT                                           Date: 09/23/2020Value: 19          Ref range: 0 - 40 U/L         Status: FinalAST                                           Date: 09/23/2020Value: 14          Ref range: 0 - 39 U/L         Status: FinalVentricular Rate                              Date: 09/23/2020Value: 57          Ref range: BPM                Status: FinalAtrial Rate                                   Date: 09/23/2020Value: 57          Ref range: BPM                Status: FinalP-R Interval                                  Date: 09/23/2020Value: 220         Ref range: ms                 Status: FinalQRS Duration                                  Date: 09/23/2020Value: 150         Ref range: ms                 Status: FinalQ-T Interval                                  Date: 09/23/2020Value: 454         Ref range: ms                 Status: FinalQTc Calculation (Bazett)                      Date: 09/23/2020Value: 441         Ref range: ms                 Status: FinalP Axis                                        Date: 09/23/2020Value: 65          Ref range: degrees            Status: FinalR Axis                                        Date: 09/23/2020Value: -43         Ref range: degrees            Status: FinalT Axis                                        Date: 09/23/2020Value: 1           Ref range: degrees            Status: FinalTroponin                                      Date: 09/23/2020Value: <0.01       Ref range: 0.00 - 0.03 ng/mL  Status: Final              Comment: TROPONIN T BLOOD LEVELS:       0.03 ng/mL     Upper Reference Limit0. 04 - 0.09 ng/mL     Possible myocardial injury    >= 0.10 ng/mL     Myocardial injuryMeter Glucose                                 Date: 09/23/2020Value: 189*        Ref range: 74 - 99 mg/dL      Status: FinalMeter Glucose                                 Date: 09/23/2020Value: 273*        Ref range: 74 - 99 mg/dL      Status: FinalMeter Glucose                                 Date: 09/24/2020Value: 184*        Ref range: 74 - 99 mg/dL      Status: FinalMeter Glucose                                 Date: 09/24/2020Value: 182*        Ref range: 74 - 99 mg/dL      Status: TqncnUIXX-HyA-0, NAAT                              Date: 09/24/2020Value: Not Detected                   Ref range: Not Detected       Status: Final              Comment: Rapid NAAT:   Negative results should be treated as presumptive and,if inconsistent with clinical signs and symptoms or necessary forpatient management, should be tested with an alternative molecularassay. Negative results do not preclude SARS-CoV-2 infection andshould not be used as the sole basis for patient management decisions. This test has been authorized by the FDA under an Emergency UseAuthorization (EUA) for use by authorized laboratories. Fact sheet for [employer] sheet for Patients: Karina.dk: Isothermal Nucleic Acid AmplificationMeter Glucose                                 Date: 09/24/2020Value: 221*        Ref range: 74 - 99 mg/dL      Status: FinalMeter Glucose                                 Date: 09/24/2020Value: 174*        Ref range: 74 - 99 mg/dL      Status: Final------------    Radiology last 7 days:  Ct Head Wo ContrastResult Date: 9/23/2020Small hypodensity in the left occipital lobe may represent edema or encephalomalacia. Further evaluation with MRI is recommended. Mri Brain Wo ContrastResult Date: 9/24/2020Diffuse atrophy likely age related Findings compatible with small vessel ischemic changes. Findings compatible with a lacunar infarct, likely old. Findings compatible with a cortical infarct, likely old.  Findings compatible with a small right occipital acute infarct Us Carotid Artery BilateralResult Date: 9/24/2020Atherosclerotic disease. Hemodynamically significant stenosis is identified Estimated stenosis by NASCET criteria in the proximal right carotid artery is between 70% and 89%. Estimated stenosis by NASCET criteria in the proximal left carotid artery is between 70% and 89%. [unfilled]    Discharge Medications    Current Discharge Medication List    Current Discharge Medication List    Current Discharge Medication ListCONTINUE these medications which have NOT CHANGEDpolyvinyl alcohol (LIQUIFILM TEARS) 1.4 % ophthalmic solutionPlace 1 drop into the left eye as neededcitalopram (CELEXA) 10 MG tabletTake 10 mg by mouth dailyfamotidine (PEPCID) 20 MG tabletTake 20 mg by mouth 2 times dailyinsulin glargine (LANTUS) 100 UNIT/ML injection vialInject 43 Units into the skin nightlylisinopril (PRINIVIL;ZESTRIL) 10 MG tabletTake 10 mg by mouth dailymetFORMIN (GLUCOPHAGE) 1000 MG tabletTake 1,000 mg by mouth 2 times daily (with meals)levothyroxine (SYNTHROID) 175 MCG tabletTake 150 mcg by mouth Daily Insulin Aspart Prot & Aspart (NOVOLOG MIX 70/30 SC)Inject 10 Units into the skin every morning 5 units before dinnersertraline (ZOLOFT) 50 MG tabletTake 75 mg by mouth daily Instructed to take am of procedurevitamin D (ERGOCALCIFEROL) 65466 UNITS CAPS capsuleTake 2,000 Units by mouth daily aspirin (ECOTRIN) 325 MG EC tabletTake 325 mg by mouth daily. Do not crushoxyCODONE-acetaminophen (PERCOCET) 5-325 MG per tabletTake 1 tablet by mouth every 4 hours as needed for Pain Instructed to take am of procedure if neededacetaminophen 650 MG TABSTake 650 mg by mouth every 4 hours as needed. Qty: 120 tablet Refills: 3isosorbide mononitrate (IMDUR) 60 MG CR tabletTake 1 tablet by mouth daily. Qty: 30 tablet Refills: 3glucagon, rDNA, 1 MG SOLR injectionInject 1 mg into the muscle as needed for Low blood sugar (Blood glucose less than 70 mg/dL and patient NOT ALERT or NPO and does not have IV access. ). Refills: 0glucose (GLUTOSE) 40 % GELTake 15 g by mouth as needed. Qty: 45 g Refills: 1magnesium hydroxide (MILK OF MAGNESIA) 400 MG/5ML suspensionTake 30 mLs by mouth daily as needed for Constipation. atorvastatin (LIPITOR) 20 MG tabletTake 20 mg by mouth daily. carvedilol (COREG) 6.25 MG tabletTake 6.25 mg by mouth 2 times daily (with meals). Instructed to take morning of surgery with a sip of watermultivitamin (OCUVITE) TABS per tabletTake 1 tablet by mouth daily. montelukast (SINGULAIR) 10 MG tabletTake 1 tablet by mouth nightly. Qty: 30 tablet Refills: 0pantoprazole (PROTONIX) 40 MG tabletTake 1 tablet by mouth every morning (before breakfast). Qty: 30 tablet Refills: 1    Current Discharge Medication ListSTOP taking these medicationsinsulin lispro (HUMALOG) 100 UNIT/ML injection vialComments:Reason for Stopping:    Time Spent on Discharge:1E] minutes were spent in patient examination, evaluation, counseling as well as medication reconciliation, prescriptions for required medications, discharge plan, and follow up.     Electronically signed by Seven Mcmullen MD on 9/25/20 at 6:44 AM EDT

## 2020-09-25 NOTE — PROGRESS NOTES
Physical Therapy    Patient is currently on PT caseload and treatment attempted. Pt sound asleep and unable to arouse to participate in therapy treatment this AM.  Will continue to follow.   Penny Allan, PT, DPT  License [id number]

## 2020-10-01 ENCOUNTER — OFFICE VISIT (OUTPATIENT)
Dept: VASCULAR SURGERY | Age: 70
End: 2020-10-01
Payer: MEDICARE

## 2020-10-01 VITALS
RESPIRATION RATE: 16 BRPM | HEIGHT: 67 IN | WEIGHT: 250 LBS | SYSTOLIC BLOOD PRESSURE: 140 MMHG | BODY MASS INDEX: 39.24 KG/M2 | DIASTOLIC BLOOD PRESSURE: 70 MMHG

## 2020-10-01 PROBLEM — I65.23 BILATERAL CAROTID ARTERY STENOSIS: Status: ACTIVE | Noted: 2020-10-01

## 2020-10-01 PROCEDURE — G8484 FLU IMMUNIZE NO ADMIN: HCPCS | Performed by: SURGERY

## 2020-10-01 PROCEDURE — G8427 DOCREV CUR MEDS BY ELIG CLIN: HCPCS | Performed by: SURGERY

## 2020-10-01 PROCEDURE — 99204 OFFICE O/P NEW MOD 45 MIN: CPT | Performed by: SURGERY

## 2020-10-01 PROCEDURE — G8417 CALC BMI ABV UP PARAM F/U: HCPCS | Performed by: SURGERY

## 2020-10-01 PROCEDURE — 4004F PT TOBACCO SCREEN RCVD TLK: CPT | Performed by: SURGERY

## 2020-10-01 PROCEDURE — 1123F ACP DISCUSS/DSCN MKR DOCD: CPT | Performed by: SURGERY

## 2020-10-01 PROCEDURE — 1111F DSCHRG MED/CURRENT MED MERGE: CPT | Performed by: SURGERY

## 2020-10-01 PROCEDURE — 4040F PNEUMOC VAC/ADMIN/RCVD: CPT | Performed by: SURGERY

## 2020-10-01 PROCEDURE — 3017F COLORECTAL CA SCREEN DOC REV: CPT | Performed by: SURGERY

## 2020-10-01 RX ORDER — CLOPIDOGREL BISULFATE 75 MG/1
75 TABLET ORAL DAILY
Qty: 30 TABLET | Refills: 2 | Status: ON HOLD | OUTPATIENT
Start: 2020-10-01 | End: 2020-12-07

## 2020-11-30 LAB — SARS-COV-2: ABNORMAL

## 2020-12-06 ENCOUNTER — APPOINTMENT (OUTPATIENT)
Dept: GENERAL RADIOLOGY | Age: 70
DRG: 177 | End: 2020-12-06
Payer: MEDICARE

## 2020-12-06 ENCOUNTER — HOSPITAL ENCOUNTER (INPATIENT)
Age: 70
LOS: 9 days | Discharge: NURSING, REHAB OR LONG-TERM CARE | DRG: 177 | End: 2020-12-16
Attending: EMERGENCY MEDICINE | Admitting: INTERNAL MEDICINE
Payer: MEDICARE

## 2020-12-06 LAB
ALBUMIN SERPL-MCNC: 3.5 G/DL (ref 3.5–5.2)
ALP BLD-CCNC: 129 U/L (ref 40–129)
ALT SERPL-CCNC: 41 U/L (ref 0–40)
ANION GAP SERPL CALCULATED.3IONS-SCNC: 14 MMOL/L (ref 7–16)
APTT: 32.7 SEC (ref 24.5–35.1)
AST SERPL-CCNC: 44 U/L (ref 0–39)
BILIRUB SERPL-MCNC: 1.6 MG/DL (ref 0–1.2)
BUN BLDV-MCNC: 50 MG/DL (ref 8–23)
CALCIUM SERPL-MCNC: 8.7 MG/DL (ref 8.6–10.2)
CHLORIDE BLD-SCNC: 102 MMOL/L (ref 98–107)
CO2: 22 MMOL/L (ref 22–29)
CREAT SERPL-MCNC: 1.5 MG/DL (ref 0.7–1.2)
FIBRINOGEN: >700 MG/DL (ref 225–540)
GFR AFRICAN AMERICAN: 56
GFR NON-AFRICAN AMERICAN: 46 ML/MIN/1.73
GLUCOSE BLD-MCNC: 101 MG/DL (ref 74–99)
INR BLD: 1.4
LACTIC ACID: 1.8 MMOL/L (ref 0.5–2.2)
MAGNESIUM: 1.7 MG/DL (ref 1.6–2.6)
POTASSIUM SERPL-SCNC: 4.5 MMOL/L (ref 3.5–5)
PRO-BNP: 4564 PG/ML (ref 0–125)
PROTHROMBIN TIME: 16.5 SEC (ref 9.3–12.4)
SODIUM BLD-SCNC: 138 MMOL/L (ref 132–146)
TOTAL PROTEIN: 6.5 G/DL (ref 6.4–8.3)
TROPONIN: 0.04 NG/ML (ref 0–0.03)

## 2020-12-06 PROCEDURE — 93005 ELECTROCARDIOGRAM TRACING: CPT | Performed by: STUDENT IN AN ORGANIZED HEALTH CARE EDUCATION/TRAINING PROGRAM

## 2020-12-06 PROCEDURE — 6370000000 HC RX 637 (ALT 250 FOR IP): Performed by: STUDENT IN AN ORGANIZED HEALTH CARE EDUCATION/TRAINING PROGRAM

## 2020-12-06 PROCEDURE — 86140 C-REACTIVE PROTEIN: CPT

## 2020-12-06 PROCEDURE — 6360000002 HC RX W HCPCS: Performed by: STUDENT IN AN ORGANIZED HEALTH CARE EDUCATION/TRAINING PROGRAM

## 2020-12-06 PROCEDURE — 84484 ASSAY OF TROPONIN QUANT: CPT

## 2020-12-06 PROCEDURE — 96374 THER/PROPH/DIAG INJ IV PUSH: CPT

## 2020-12-06 PROCEDURE — 85025 COMPLETE CBC W/AUTO DIFF WBC: CPT

## 2020-12-06 PROCEDURE — 84145 PROCALCITONIN (PCT): CPT

## 2020-12-06 PROCEDURE — 94640 AIRWAY INHALATION TREATMENT: CPT

## 2020-12-06 PROCEDURE — 83605 ASSAY OF LACTIC ACID: CPT

## 2020-12-06 PROCEDURE — 85384 FIBRINOGEN ACTIVITY: CPT

## 2020-12-06 PROCEDURE — 99285 EMERGENCY DEPT VISIT HI MDM: CPT

## 2020-12-06 PROCEDURE — 83615 LACTATE (LD) (LDH) ENZYME: CPT

## 2020-12-06 PROCEDURE — 83735 ASSAY OF MAGNESIUM: CPT

## 2020-12-06 PROCEDURE — 71045 X-RAY EXAM CHEST 1 VIEW: CPT

## 2020-12-06 PROCEDURE — 82728 ASSAY OF FERRITIN: CPT

## 2020-12-06 PROCEDURE — 85610 PROTHROMBIN TIME: CPT

## 2020-12-06 PROCEDURE — 85730 THROMBOPLASTIN TIME PARTIAL: CPT

## 2020-12-06 PROCEDURE — 80053 COMPREHEN METABOLIC PANEL: CPT

## 2020-12-06 PROCEDURE — 83880 ASSAY OF NATRIURETIC PEPTIDE: CPT

## 2020-12-06 PROCEDURE — 85378 FIBRIN DEGRADE SEMIQUANT: CPT

## 2020-12-06 RX ORDER — IPRATROPIUM BROMIDE AND ALBUTEROL SULFATE 2.5; .5 MG/3ML; MG/3ML
3 SOLUTION RESPIRATORY (INHALATION) ONCE
Status: DISCONTINUED | OUTPATIENT
Start: 2020-12-06 | End: 2020-12-06 | Stop reason: CLARIF

## 2020-12-06 RX ORDER — DEXAMETHASONE SODIUM PHOSPHATE 10 MG/ML
6 INJECTION, SOLUTION INTRAMUSCULAR; INTRAVENOUS EVERY 6 HOURS
Status: DISCONTINUED | OUTPATIENT
Start: 2020-12-06 | End: 2020-12-07 | Stop reason: SDUPTHER

## 2020-12-06 RX ORDER — ACETAMINOPHEN 500 MG
1000 TABLET ORAL ONCE
Status: COMPLETED | OUTPATIENT
Start: 2020-12-06 | End: 2020-12-06

## 2020-12-06 RX ADMIN — IPRATROPIUM BROMIDE AND ALBUTEROL 2 PUFF: 20; 100 SPRAY, METERED RESPIRATORY (INHALATION) at 23:34

## 2020-12-06 RX ADMIN — IPRATROPIUM BROMIDE AND ALBUTEROL 2 PUFF: 20; 100 SPRAY, METERED RESPIRATORY (INHALATION) at 23:35

## 2020-12-06 RX ADMIN — IPRATROPIUM BROMIDE AND ALBUTEROL 2 PUFF: 20; 100 SPRAY, METERED RESPIRATORY (INHALATION) at 23:36

## 2020-12-06 RX ADMIN — ACETAMINOPHEN 1000 MG: 500 TABLET ORAL at 23:30

## 2020-12-06 RX ADMIN — DEXAMETHASONE SODIUM PHOSPHATE 6 MG: 10 INJECTION, SOLUTION INTRAMUSCULAR; INTRAVENOUS at 23:31

## 2020-12-06 ASSESSMENT — PAIN SCALES - GENERAL: PAINLEVEL_OUTOF10: 0

## 2020-12-06 NOTE — LETTER
Beneficiary Notification Letter  BPCI Advanced     Your Doctor or [address],    We wanted to let you know that your health care provider, [address] [place] [address] Olivia, has volunteered to take part in our [facility] for [facility] Medicaid Services (CMS) Bundled Payments for [facility] (BPCI Advanced). This doesnt change your Medicare rights or benefits and you dont need to do anything. What are bundled payments? A bundled payment combines, or bundles together, payments that Medicare makes to your health care providers for the many different kinds of medical services you might get in a specific time period. In BPCI Advanced, this time period could include a hospital inpatient stay or outpatient procedure, plus 90 days. Why would Medicare bundle payments? Bundled payments are thought of as a value-based way to pay because health care providers are responsible for both the quality and cost of medical care they give. This is a relatively new way of paying health care providers compared to thefee-for-service way Medicare has traditionally paid, where providers are paid separately for each service they provide. Bundled payments encourage these providers to work together to provide better, more coordinated care during your hospital stay, or outpatient procedure, and through your recovery. What does BPCI Advance mean for me? Youre more likely to get even better care when hospitals, doctors, and other health care providers work together. In BPCI Advanced, hospitals, doctors, and other health care providers may be rewarded for providing better, more coordinated health care. Medicare will watch BPCI Advanced participants closely to make sure that you and other patients keep getting efficient, high quality care. What do I need to know about BPCI Advanced? Whats most important for you to know is that your Medicare rights and benefits wont change because your health care provider is participating in [address] [place]. Medicare will keep covering all of your medically necessary services. Even though Medicare will pay your doctor in a different way under BPCI Advanced, how much you have to pay wont change. Health care providers and suppliers who are enrolled in Medicare will submit their Medicare claims like they always have. Youll have all the same Medicare rights and protections, including the right to choose which hospital, doctor, or other health care provider you see. If you dont want to get care from a health care provider whos participating in [address] [place], then youll have to choose a different health care provider whos not participating in the Model. How can I give feedback about my health care? Medicare might ask you to take a voluntary survey about the services and care you received from [address] [place] [address] during your hospital stay or outpatient procedure and for a specific period of time afterwards. You can decide whether you want to take the voluntary survey, but if you do, itll help Medicare make BPCI Advanced and the care of other Medicare patients better. If you have concerns or complaints about your care, you can:   · Talk to your doctor or health care provider. · Contact your Beneficiary and Family Centered Care Quality Improvement   Organization [facility]). You can get your [facility]s phone number  at  Medicare.gov/contacts or by calling 1-800-MEDICARE. TTY users can call  9-250.115.8079. Where can I learn more about BPCI Advanced? Learn more about BPCI Advanced at https://innovation.cms.gov/initiatives/bpci-advanced/:  · A list of all the hospitals and physician group practices in the country participating in [address] [place]. · All of the inpatient and outpatient Clinical Episodes that are currently included under BPCI Advanced. A Clinical Episode is a grouping of medical conditions or diagnoses that are included in the [address] [facility].

## 2020-12-07 PROBLEM — J96.01 ACUTE RESPIRATORY FAILURE WITH HYPOXIA (HCC): Status: ACTIVE | Noted: 2020-12-07

## 2020-12-07 LAB
AADO2: 299.3 MMHG
ALBUMIN SERPL-MCNC: 2.9 G/DL (ref 3.5–5.2)
ALP BLD-CCNC: 119 U/L (ref 40–129)
ALT SERPL-CCNC: 41 U/L (ref 0–40)
ANION GAP SERPL CALCULATED.3IONS-SCNC: 14 MMOL/L (ref 7–16)
ANISOCYTOSIS: ABNORMAL
AST SERPL-CCNC: 40 U/L (ref 0–39)
B.E.: -3.5 MMOL/L (ref -3–3)
BACTERIA: ABNORMAL /HPF
BASOPHILS ABSOLUTE: 0 E9/L (ref 0–0.2)
BASOPHILS ABSOLUTE: 0.01 E9/L (ref 0–0.2)
BASOPHILS RELATIVE PERCENT: 0 % (ref 0–2)
BASOPHILS RELATIVE PERCENT: 0.1 % (ref 0–2)
BILIRUB SERPL-MCNC: 1.4 MG/DL (ref 0–1.2)
BILIRUBIN URINE: NEGATIVE
BLOOD, URINE: ABNORMAL
BUN BLDV-MCNC: 58 MG/DL (ref 8–23)
BURR CELLS: ABNORMAL
C-REACTIVE PROTEIN: 15.4 MG/DL (ref 0–0.4)
CALCIUM SERPL-MCNC: 8.4 MG/DL (ref 8.6–10.2)
CHLORIDE BLD-SCNC: 106 MMOL/L (ref 98–107)
CLARITY: CLEAR
CO2: 19 MMOL/L (ref 22–29)
COHB: 0.9 % (ref 0–1.5)
COLOR: YELLOW
CREAT SERPL-MCNC: 1.5 MG/DL (ref 0.7–1.2)
CREATININE URINE: 111 MG/DL (ref 40–278)
CRITICAL: ABNORMAL
D DIMER: 827 NG/ML DDU
DATE ANALYZED: ABNORMAL
DATE OF COLLECTION: ABNORMAL
EKG ATRIAL RATE: 105 BPM
EKG P AXIS: 62 DEGREES
EKG P-R INTERVAL: 188 MS
EKG Q-T INTERVAL: 336 MS
EKG QRS DURATION: 140 MS
EKG QTC CALCULATION (BAZETT): 444 MS
EKG R AXIS: -53 DEGREES
EKG T AXIS: 35 DEGREES
EKG VENTRICULAR RATE: 105 BPM
EOSINOPHILS ABSOLUTE: 0 E9/L (ref 0.05–0.5)
EOSINOPHILS ABSOLUTE: 0 E9/L (ref 0.05–0.5)
EOSINOPHILS RELATIVE PERCENT: 0 % (ref 0–6)
EOSINOPHILS RELATIVE PERCENT: 0 % (ref 0–6)
FIO2: 70 %
GFR AFRICAN AMERICAN: 56
GFR NON-AFRICAN AMERICAN: 46 ML/MIN/1.73
GLUCOSE BLD-MCNC: 213 MG/DL (ref 74–99)
GLUCOSE URINE: NEGATIVE MG/DL
HCO3: 21 MMOL/L (ref 22–26)
HCT VFR BLD CALC: 36.9 % (ref 37–54)
HCT VFR BLD CALC: 38.8 % (ref 37–54)
HEMOGLOBIN: 11.6 G/DL (ref 12.5–16.5)
HEMOGLOBIN: 12.4 G/DL (ref 12.5–16.5)
HHB: 1 % (ref 0–5)
IMMATURE GRANULOCYTES #: 0.07 E9/L
IMMATURE GRANULOCYTES #: 0.08 E9/L
IMMATURE GRANULOCYTES %: 0.5 % (ref 0–5)
IMMATURE GRANULOCYTES %: 0.5 % (ref 0–5)
KETONES, URINE: ABNORMAL MG/DL
LAB: ABNORMAL
LACTATE DEHYDROGENASE: 477 U/L (ref 135–225)
LEUKOCYTE ESTERASE, URINE: NEGATIVE
LYMPHOCYTES ABSOLUTE: 0.32 E9/L (ref 1.5–4)
LYMPHOCYTES ABSOLUTE: 0.41 E9/L (ref 1.5–4)
LYMPHOCYTES RELATIVE PERCENT: 2.1 % (ref 20–42)
LYMPHOCYTES RELATIVE PERCENT: 2.7 % (ref 20–42)
Lab: ABNORMAL
MCH RBC QN AUTO: 26 PG (ref 26–35)
MCH RBC QN AUTO: 26.1 PG (ref 26–35)
MCHC RBC AUTO-ENTMCNC: 31.4 % (ref 32–34.5)
MCHC RBC AUTO-ENTMCNC: 32 % (ref 32–34.5)
MCV RBC AUTO: 81.3 FL (ref 80–99.9)
MCV RBC AUTO: 83.1 FL (ref 80–99.9)
METER GLUCOSE: 196 MG/DL (ref 74–99)
METER GLUCOSE: 197 MG/DL (ref 74–99)
METER GLUCOSE: 209 MG/DL (ref 74–99)
METER GLUCOSE: 263 MG/DL (ref 74–99)
METHB: 0.3 % (ref 0–1.5)
MODE: ABNORMAL
MONOCYTES ABSOLUTE: 0.34 E9/L (ref 0.1–0.95)
MONOCYTES ABSOLUTE: 0.42 E9/L (ref 0.1–0.95)
MONOCYTES RELATIVE PERCENT: 2.3 % (ref 2–12)
MONOCYTES RELATIVE PERCENT: 2.8 % (ref 2–12)
NEUTROPHILS ABSOLUTE: 14.11 E9/L (ref 1.8–7.3)
NEUTROPHILS ABSOLUTE: 14.38 E9/L (ref 1.8–7.3)
NEUTROPHILS RELATIVE PERCENT: 94.5 % (ref 43–80)
NEUTROPHILS RELATIVE PERCENT: 94.5 % (ref 43–80)
NITRITE, URINE: NEGATIVE
O2 CONTENT: 17.9 ML/DL
O2 SATURATION: 99 % (ref 92–98.5)
O2HB: 97.8 % (ref 94–97)
OPERATOR ID: ABNORMAL
OVALOCYTES: ABNORMAL
OVALOCYTES: ABNORMAL
PATIENT TEMP: 37 C
PCO2: 36 MMHG (ref 35–45)
PDW BLD-RTO: 15.8 FL (ref 11.5–15)
PDW BLD-RTO: 15.9 FL (ref 11.5–15)
PEEP/CPAP: 6 CMH2O
PFO2: 2.05 MMHG/%
PH BLOOD GAS: 7.38 (ref 7.35–7.45)
PH UA: 5.5 (ref 5–9)
PLATELET # BLD: 270 E9/L (ref 130–450)
PLATELET # BLD: 287 E9/L (ref 130–450)
PMV BLD AUTO: 10.8 FL (ref 7–12)
PMV BLD AUTO: 11.2 FL (ref 7–12)
PO2: 143.6 MMHG (ref 75–100)
POIKILOCYTES: ABNORMAL
POIKILOCYTES: ABNORMAL
POLYCHROMASIA: ABNORMAL
POTASSIUM REFLEX MAGNESIUM: 4.9 MMOL/L (ref 3.5–5)
PROCALCITONIN: 6.01 NG/ML (ref 0–0.08)
PROTEIN PROTEIN: 56 MG/DL (ref 0–12)
PROTEIN UA: 30 MG/DL
PROTEIN/CREAT RATIO: 0.5
PROTEIN/CREAT RATIO: 0.5 (ref 0–0.2)
PS: 16 CMH20
RBC # BLD: 4.44 E12/L (ref 3.8–5.8)
RBC # BLD: 4.77 E12/L (ref 3.8–5.8)
RBC UA: ABNORMAL /HPF (ref 0–2)
RI(T): 208 %
SODIUM BLD-SCNC: 139 MMOL/L (ref 132–146)
SOURCE, BLOOD GAS: ABNORMAL
SPECIFIC GRAVITY UA: >=1.03 (ref 1–1.03)
TEAR DROP CELLS: ABNORMAL
THB: 12.8 G/DL (ref 11.5–16.5)
TIME ANALYZED: 152
TOTAL PROTEIN: 6.5 G/DL (ref 6.4–8.3)
TROPONIN: 0.03 NG/ML (ref 0–0.03)
TROPONIN: 0.05 NG/ML (ref 0–0.03)
UROBILINOGEN, URINE: 1 E.U./DL
WBC # BLD: 14.9 E9/L (ref 4.5–11.5)
WBC # BLD: 15.2 E9/L (ref 4.5–11.5)
WBC UA: ABNORMAL /HPF (ref 0–5)

## 2020-12-07 PROCEDURE — 87088 URINE BACTERIA CULTURE: CPT

## 2020-12-07 PROCEDURE — 6360000002 HC RX W HCPCS: Performed by: STUDENT IN AN ORGANIZED HEALTH CARE EDUCATION/TRAINING PROGRAM

## 2020-12-07 PROCEDURE — 83520 IMMUNOASSAY QUANT NOS NONAB: CPT

## 2020-12-07 PROCEDURE — 6360000002 HC RX W HCPCS: Performed by: INTERNAL MEDICINE

## 2020-12-07 PROCEDURE — 1200000000 HC SEMI PRIVATE

## 2020-12-07 PROCEDURE — 93010 ELECTROCARDIOGRAM REPORT: CPT | Performed by: INTERNAL MEDICINE

## 2020-12-07 PROCEDURE — 2580000003 HC RX 258: Performed by: INTERNAL MEDICINE

## 2020-12-07 PROCEDURE — 81001 URINALYSIS AUTO W/SCOPE: CPT

## 2020-12-07 PROCEDURE — XW033E5 INTRODUCTION OF REMDESIVIR ANTI-INFECTIVE INTO PERIPHERAL VEIN, PERCUTANEOUS APPROACH, NEW TECHNOLOGY GROUP 5: ICD-10-PCS | Performed by: INTERNAL MEDICINE

## 2020-12-07 PROCEDURE — 87449 NOS EACH ORGANISM AG IA: CPT

## 2020-12-07 PROCEDURE — 85025 COMPLETE CBC W/AUTO DIFF WBC: CPT

## 2020-12-07 PROCEDURE — 84484 ASSAY OF TROPONIN QUANT: CPT

## 2020-12-07 PROCEDURE — 82962 GLUCOSE BLOOD TEST: CPT

## 2020-12-07 PROCEDURE — 82570 ASSAY OF URINE CREATININE: CPT

## 2020-12-07 PROCEDURE — 36415 COLL VENOUS BLD VENIPUNCTURE: CPT

## 2020-12-07 PROCEDURE — 6370000000 HC RX 637 (ALT 250 FOR IP): Performed by: INTERNAL MEDICINE

## 2020-12-07 PROCEDURE — 2500000003 HC RX 250 WO HCPCS: Performed by: INTERNAL MEDICINE

## 2020-12-07 PROCEDURE — 94660 CPAP INITIATION&MGMT: CPT

## 2020-12-07 PROCEDURE — 82805 BLOOD GASES W/O2 SATURATION: CPT

## 2020-12-07 PROCEDURE — 84156 ASSAY OF PROTEIN URINE: CPT

## 2020-12-07 PROCEDURE — 2060000000 HC ICU INTERMEDIATE R&B

## 2020-12-07 PROCEDURE — 80053 COMPREHEN METABOLIC PANEL: CPT

## 2020-12-07 RX ORDER — DEXTROSE MONOHYDRATE 25 G/50ML
12.5 INJECTION, SOLUTION INTRAVENOUS PRN
Status: DISCONTINUED | OUTPATIENT
Start: 2020-12-07 | End: 2020-12-16 | Stop reason: HOSPADM

## 2020-12-07 RX ORDER — SODIUM CHLORIDE 0.9 % (FLUSH) 0.9 %
10 SYRINGE (ML) INJECTION EVERY 12 HOURS SCHEDULED
Status: DISCONTINUED | OUTPATIENT
Start: 2020-12-07 | End: 2020-12-16 | Stop reason: HOSPADM

## 2020-12-07 RX ORDER — 0.9 % SODIUM CHLORIDE 0.9 %
30 INTRAVENOUS SOLUTION INTRAVENOUS PRN
Status: DISCONTINUED | OUTPATIENT
Start: 2020-12-07 | End: 2020-12-16 | Stop reason: HOSPADM

## 2020-12-07 RX ORDER — SENNA PLUS 8.6 MG/1
1 TABLET ORAL DAILY PRN
Status: DISCONTINUED | OUTPATIENT
Start: 2020-12-07 | End: 2020-12-16 | Stop reason: HOSPADM

## 2020-12-07 RX ORDER — ATORVASTATIN CALCIUM 20 MG/1
20 TABLET, FILM COATED ORAL DAILY
Status: DISCONTINUED | OUTPATIENT
Start: 2020-12-07 | End: 2020-12-16 | Stop reason: HOSPADM

## 2020-12-07 RX ORDER — CLOPIDOGREL BISULFATE 75 MG/1
75 TABLET ORAL DAILY
Status: DISCONTINUED | OUTPATIENT
Start: 2020-12-07 | End: 2020-12-16 | Stop reason: HOSPADM

## 2020-12-07 RX ORDER — POLYVINYL ALCOHOL 14 MG/ML
1 SOLUTION/ DROPS OPHTHALMIC PRN
Status: DISCONTINUED | OUTPATIENT
Start: 2020-12-07 | End: 2020-12-16 | Stop reason: HOSPADM

## 2020-12-07 RX ORDER — ISOSORBIDE MONONITRATE 60 MG/1
60 TABLET, EXTENDED RELEASE ORAL DAILY
Status: DISCONTINUED | OUTPATIENT
Start: 2020-12-07 | End: 2020-12-16 | Stop reason: HOSPADM

## 2020-12-07 RX ORDER — NICOTINE POLACRILEX 4 MG
15 LOZENGE BUCCAL PRN
Status: DISCONTINUED | OUTPATIENT
Start: 2020-12-07 | End: 2020-12-16 | Stop reason: HOSPADM

## 2020-12-07 RX ORDER — ACETAMINOPHEN 325 MG/1
650 TABLET ORAL EVERY 6 HOURS PRN
Status: DISCONTINUED | OUTPATIENT
Start: 2020-12-07 | End: 2020-12-16 | Stop reason: HOSPADM

## 2020-12-07 RX ORDER — ALBUTEROL SULFATE 90 UG/1
2 AEROSOL, METERED RESPIRATORY (INHALATION) 4 TIMES DAILY
Status: DISCONTINUED | OUTPATIENT
Start: 2020-12-07 | End: 2020-12-16 | Stop reason: HOSPADM

## 2020-12-07 RX ORDER — ASCORBIC ACID 500 MG
1000 TABLET ORAL 2 TIMES DAILY
Status: DISCONTINUED | OUTPATIENT
Start: 2020-12-07 | End: 2020-12-16 | Stop reason: HOSPADM

## 2020-12-07 RX ORDER — DOXYCYCLINE HYCLATE 100 MG/1
100 CAPSULE ORAL EVERY 12 HOURS SCHEDULED
Status: DISCONTINUED | OUTPATIENT
Start: 2020-12-07 | End: 2020-12-10

## 2020-12-07 RX ORDER — INSULIN GLARGINE 100 [IU]/ML
20 INJECTION, SOLUTION SUBCUTANEOUS NIGHTLY
Status: DISCONTINUED | OUTPATIENT
Start: 2020-12-07 | End: 2020-12-09

## 2020-12-07 RX ORDER — LEVOTHYROXINE SODIUM 0.07 MG/1
150 TABLET ORAL DAILY
Status: DISCONTINUED | OUTPATIENT
Start: 2020-12-07 | End: 2020-12-16 | Stop reason: HOSPADM

## 2020-12-07 RX ORDER — DEXTROSE MONOHYDRATE 50 MG/ML
100 INJECTION, SOLUTION INTRAVENOUS PRN
Status: DISCONTINUED | OUTPATIENT
Start: 2020-12-07 | End: 2020-12-16 | Stop reason: HOSPADM

## 2020-12-07 RX ORDER — SODIUM CHLORIDE 0.9 % (FLUSH) 0.9 %
10 SYRINGE (ML) INJECTION PRN
Status: DISCONTINUED | OUTPATIENT
Start: 2020-12-07 | End: 2020-12-16 | Stop reason: HOSPADM

## 2020-12-07 RX ORDER — INSULIN ASPART 100 [IU]/ML
0-12 INJECTION, SOLUTION INTRAVENOUS; SUBCUTANEOUS
COMMUNITY
End: 2021-08-10

## 2020-12-07 RX ORDER — CARVEDILOL 6.25 MG/1
6.25 TABLET ORAL 2 TIMES DAILY WITH MEALS
Status: DISCONTINUED | OUTPATIENT
Start: 2020-12-07 | End: 2020-12-15

## 2020-12-07 RX ORDER — INSULIN ASPART 100 [IU]/ML
12 INJECTION, SOLUTION INTRAVENOUS; SUBCUTANEOUS
Status: ON HOLD | COMMUNITY
End: 2021-03-24 | Stop reason: HOSPADM

## 2020-12-07 RX ORDER — MONTELUKAST SODIUM 10 MG/1
10 TABLET ORAL NIGHTLY
Status: DISCONTINUED | OUTPATIENT
Start: 2020-12-07 | End: 2020-12-16 | Stop reason: HOSPADM

## 2020-12-07 RX ORDER — ZINC SULFATE 50(220)MG
50 CAPSULE ORAL DAILY
Status: DISCONTINUED | OUTPATIENT
Start: 2020-12-07 | End: 2020-12-16 | Stop reason: HOSPADM

## 2020-12-07 RX ORDER — ALBUTEROL SULFATE 90 UG/1
2 AEROSOL, METERED RESPIRATORY (INHALATION) EVERY 6 HOURS PRN
Status: DISCONTINUED | OUTPATIENT
Start: 2020-12-07 | End: 2020-12-07

## 2020-12-07 RX ORDER — ACETAMINOPHEN 650 MG/1
650 SUPPOSITORY RECTAL EVERY 6 HOURS PRN
Status: DISCONTINUED | OUTPATIENT
Start: 2020-12-07 | End: 2020-12-16 | Stop reason: HOSPADM

## 2020-12-07 RX ORDER — PANTOPRAZOLE SODIUM 40 MG/1
40 TABLET, DELAYED RELEASE ORAL
Status: DISCONTINUED | OUTPATIENT
Start: 2020-12-07 | End: 2020-12-16 | Stop reason: HOSPADM

## 2020-12-07 RX ORDER — BUDESONIDE AND FORMOTEROL FUMARATE DIHYDRATE 160; 4.5 UG/1; UG/1
2 AEROSOL RESPIRATORY (INHALATION) 2 TIMES DAILY
Status: DISCONTINUED | OUTPATIENT
Start: 2020-12-07 | End: 2020-12-16 | Stop reason: HOSPADM

## 2020-12-07 RX ORDER — OXYCODONE HYDROCHLORIDE AND ACETAMINOPHEN 5; 325 MG/1; MG/1
1 TABLET ORAL EVERY 4 HOURS PRN
Status: DISCONTINUED | OUTPATIENT
Start: 2020-12-07 | End: 2020-12-16 | Stop reason: HOSPADM

## 2020-12-07 RX ORDER — DEXAMETHASONE SODIUM PHOSPHATE 4 MG/ML
6 INJECTION, SOLUTION INTRA-ARTICULAR; INTRALESIONAL; INTRAMUSCULAR; INTRAVENOUS; SOFT TISSUE EVERY 6 HOURS
Status: DISCONTINUED | OUTPATIENT
Start: 2020-12-07 | End: 2020-12-09

## 2020-12-07 RX ADMIN — INSULIN LISPRO 1 UNITS: 100 INJECTION, SOLUTION INTRAVENOUS; SUBCUTANEOUS at 17:38

## 2020-12-07 RX ADMIN — INSULIN GLARGINE 20 UNITS: 100 INJECTION, SOLUTION SUBCUTANEOUS at 21:02

## 2020-12-07 RX ADMIN — BUDESONIDE AND FORMOTEROL FUMARATE DIHYDRATE 2 PUFF: 160; 4.5 AEROSOL RESPIRATORY (INHALATION) at 21:07

## 2020-12-07 RX ADMIN — ALBUTEROL SULFATE 2 PUFF: 90 AEROSOL, METERED RESPIRATORY (INHALATION) at 21:02

## 2020-12-07 RX ADMIN — ZINC SULFATE 220 MG (50 MG) CAPSULE 50 MG: CAPSULE at 16:58

## 2020-12-07 RX ADMIN — DOXYCYCLINE HYCLATE 100 MG: 100 CAPSULE ORAL at 21:02

## 2020-12-07 RX ADMIN — DEXAMETHASONE SODIUM PHOSPHATE 6 MG: 4 INJECTION, SOLUTION INTRA-ARTICULAR; INTRALESIONAL; INTRAMUSCULAR; INTRAVENOUS; SOFT TISSUE at 11:59

## 2020-12-07 RX ADMIN — ATORVASTATIN CALCIUM 20 MG: 20 TABLET, FILM COATED ORAL at 21:03

## 2020-12-07 RX ADMIN — REMDESIVIR 200 MG: 100 INJECTION, POWDER, LYOPHILIZED, FOR SOLUTION INTRAVENOUS at 05:15

## 2020-12-07 RX ADMIN — MICONAZOLE NITRATE: 20 POWDER TOPICAL at 11:00

## 2020-12-07 RX ADMIN — WATER 1 G: 1 INJECTION INTRAMUSCULAR; INTRAVENOUS; SUBCUTANEOUS at 16:57

## 2020-12-07 RX ADMIN — ENOXAPARIN SODIUM 40 MG: 40 INJECTION SUBCUTANEOUS at 21:02

## 2020-12-07 RX ADMIN — Medication 10 ML: at 11:04

## 2020-12-07 RX ADMIN — Medication 10 ML: at 21:02

## 2020-12-07 RX ADMIN — MONTELUKAST SODIUM 10 MG: 10 TABLET, FILM COATED ORAL at 21:02

## 2020-12-07 RX ADMIN — DEXAMETHASONE SODIUM PHOSPHATE 6 MG: 4 INJECTION, SOLUTION INTRA-ARTICULAR; INTRALESIONAL; INTRAMUSCULAR; INTRAVENOUS; SOFT TISSUE at 22:19

## 2020-12-07 RX ADMIN — DEXAMETHASONE SODIUM PHOSPHATE 6 MG: 10 INJECTION, SOLUTION INTRAMUSCULAR; INTRAVENOUS at 04:35

## 2020-12-07 RX ADMIN — Medication 1000 MG: at 21:02

## 2020-12-07 RX ADMIN — CARVEDILOL 6.25 MG: 6.25 TABLET, FILM COATED ORAL at 16:58

## 2020-12-07 RX ADMIN — MICONAZOLE NITRATE: 20 POWDER TOPICAL at 21:02

## 2020-12-07 RX ADMIN — DEXAMETHASONE SODIUM PHOSPHATE 6 MG: 4 INJECTION, SOLUTION INTRA-ARTICULAR; INTRALESIONAL; INTRAMUSCULAR; INTRAVENOUS; SOFT TISSUE at 16:58

## 2020-12-07 RX ADMIN — INSULIN LISPRO 1 UNITS: 100 INJECTION, SOLUTION INTRAVENOUS; SUBCUTANEOUS at 21:02

## 2020-12-07 RX ADMIN — INSULIN LISPRO 3 UNITS: 100 INJECTION, SOLUTION INTRAVENOUS; SUBCUTANEOUS at 13:51

## 2020-12-07 RX ADMIN — ENOXAPARIN SODIUM 40 MG: 40 INJECTION SUBCUTANEOUS at 11:00

## 2020-12-07 ASSESSMENT — ENCOUNTER SYMPTOMS
SHORTNESS OF BREATH: 1
CHEST TIGHTNESS: 0
NAUSEA: 0
CONSTIPATION: 0
PHOTOPHOBIA: 0
COUGH: 0
WHEEZING: 0
ABDOMINAL DISTENTION: 0
TROUBLE SWALLOWING: 0
VOMITING: 0
VOICE CHANGE: 0
COLOR CHANGE: 0
DIARRHEA: 0
ABDOMINAL PAIN: 0

## 2020-12-07 ASSESSMENT — PAIN SCALES - GENERAL
PAINLEVEL_OUTOF10: 0

## 2020-12-07 NOTE — CARE COORDINATION
Social Work / Discharge Planning : COVID POSITIVE: patient was admitted from [place] [address]. SW spoke to [facility] from [place] and patient is a medicaid bed hold. No pre-cert needed to return. N 17 generated and transport forms completed. SW to follow.  Electronically signed by PIOTR Knight on 12/7/20 at 9:51 AM EST

## 2020-12-07 NOTE — DISCHARGE INSTR - COC
Continuity of Care Form    Patient Name: Alejandrina Ramires   :  1950  MRN:  [id number]    Admit date:  2020  Discharge date:  20    Code Status Order: Full Code   Advance Directives:   Advance Care Flowsheet Documentation       Date/Time Healthcare Directive Type of Healthcare Directive Copy in [address] Agent's Name Healthcare Agent's Phone Number    [phone]  No, patient does not have an advance directive for healthcare treatment  --  Other (Comment) DNR paper from facility - copy in chart  --  --  --            Admitting Physician:  Fausto Erickson DO  PCP: Zane Rice DO    Discharging Nurse: MACK INEZ [facility] Unit/Room#: [address] Phone Number: [phone]    Emergency Contact:   Extended Emergency Contact Information  Primary Emergency Contact: Sirena De Los Santos  Address: [facility] [address] Phone: [phone]  Mobile Phone: [phone]  Relation: Brother/Sister  Secondary Emergency Contact: Sadie Halina Sherriemarcela Vianca  Address: [address] [place] [address] Phone: [phone]  Relation: Brother/Sister    Past Surgical History:  Past Surgical History:   Procedure Laterality Date    CARDIAC SURGERY      CORONARY ANGIOPLASTY WITH STENT PLACEMENT  2011    EYE SURGERY Right 2015    right eye cataract extraction with intraocular lens  implant    KNEE SURGERY      OTHER SURGICAL HISTORY Left 2015    excision and debridement left groin and scrotom    OTHER SURGICAL HISTORY  3/2/15    perineal wound check    OTHER SURGICAL HISTORY Left 3/2/2015    incision and drainage of scrotal abscesses and left fortino abscess by Dr Gala Astudillo       Immunization History: There is no immunization history on file for this patient.     Active Problems:  Patient Active Problem List   Diagnosis Code    CAD ending 12/07/20 0953  No intake/output data recorded. Safety Concerns: At Risk for Falls    Impairments/Disabilities:      Vision and Hearing    Nutrition Therapy:  Current Nutrition Therapy:   - Oral Diet:  Carb Control 4 carbs/meal (1800kcals/day)    Routes of Feeding: Oral  Liquids: No Restrictions  Daily Fluid Restriction: no  Last Modified Barium Swallow with Video (Video Swallowing Test): not done    Treatments at the Time of Hospital Discharge:   Respiratory Treatments: ***  Oxygen Therapy:  is on oxygen at 2 L/min per nasal cannula. Ventilator:    - No ventilator support    Rehab Therapies: Physical Therapy, Occupational Therapy and Speech/Language Therapy  Weight Bearing Status/Restrictions: No weight bearing restirctions  Other Medical Equipment (for information only, NOT a DME order):  {EQUIPMENT:[id number]}  Other Treatments: ***    Patient's personal belongings (please select all that are sent with patient):  {P DME Belongings:[id number]}    RN SIGNATURE:  Electronically signed by Rubén Navarro RN on 12/16/20 at 10:52 AM EST    CASE MANAGEMENT/SOCIAL WORK SECTION    Inpatient Status Date: ***    Readmission Risk Assessment Score:  Readmission Risk              Risk of Unplanned Readmission:        24           Discharging to Facility/ Agency   · Name: [facility]   · [facility], [address]  · Phone:[phone]  · NZI:[phone]    Dialysis Facility (if applicable)   · Name:  · Address:  · Dialysis Schedule:  · Phone:  · Fax:    / signature: {Esignature:[id number]} Electronically signed by PIOTR Dowling on 12/7/20 at 9:54 AM EST      PHYSICIAN SECTION    Prognosis: Fair    Condition at Discharge: Stable    Rehab Potential (if transferring to Rehab):  Fair    Recommended Labs or Other Treatments After Discharge: cardio follow-up    Physician Certification: I certify the above information and transfer of Alexsandra Lung  is necessary for the continuing treatment of the diagnosis listed and that he requires [place] for greater 30 days.      Update Admission H&P: No change in H&P    PHYSICIAN SIGNATURE:  Electronically signed by Lana Mims DO on 12/16/20 at 9:59 AM EST

## 2020-12-07 NOTE — PROGRESS NOTES
Lauri Garzon was ordered polyvinyl alcohol (LIQUIFILM TEARS) 1.4 % ophthalmic solution which is on backorder. The patient has indicated that the home supply of this medication will be brought in to the hospital for inpatient use. If the medication has not been administered by 1400 on the following day from the time the order was placed, a pharmacist will follow-up with the nurse of the patient to assess the capability of the patient to bring in the medication. If it is determined that the patient cannot supply the medication and it is not available to be dispensed from the pharmacy, a call will be placed to the ordering provider to discuss alternative options.    Maico Thkakar, [facility]  12/7/2020  1:15 AM

## 2020-12-07 NOTE — CONSULTS
PULMONARY CONSULTATION    Reason for Consultation: acute on chronic hypoxic respiratory failure  Referring Physician: Mehreen Nation DO    Communication with the referring physician will be sent via the electronic medical record. HPI:    The patient is a 65year old male with a history of asthma/copd, nicotine dependence, DEMARCUS non compliant with PAP therapy, obesity, PAD, CAD, diabetes, who was transferred to the ED from [place] [id number] for increased shortness of breath. Typically he wears 3L supplemental oxygen, however, he was hypoxic on this and currently was receiving 15L HF when I saw him. Within the ED and overnight he was requiring BiPAP support. He did endorse coughing, SOB, but denies fevers, myalgias, loss of smell/taste, chest pain, syncope, edema. He was found to be covid positive on 11/30/2020. CXR with bilateral infiltrates. Thus far he has been placed on dexamethasone, doxycycline, remdesivir.        Past Medical History:   Diagnosis Date    Arthritis     Asthma     Bilateral carotid artery stenosis 10/1/2020    CAD (coronary artery disease) 2001    FREDA to RCA    [facility]     Diabetes mellitus [facility]     Marcus's gangrene     Hyperlipidemia     Hypothyroidism     DEMARCUS (obstructive sleep apnea)     no cpap or bipap     Pneumonia 2/5/2015    Stroke [facility])        Past Surgical History:   Procedure Laterality Date    CARDIAC SURGERY      CORONARY ANGIOPLASTY WITH STENT PLACEMENT  12/2011    EYE SURGERY Right 8-    right eye cataract extraction with intraocular lens  implant    KNEE SURGERY      OTHER SURGICAL HISTORY Left 2/11/2015    excision and debridement left groin and scrotom    OTHER SURGICAL HISTORY  3/2/15    perineal wound check    OTHER SURGICAL HISTORY Left 3/2/2015    incision and drainage of scrotal abscesses and left fortino abscess by Dr Olivia Huerta       Family History   Problem Relation Age of Onset    Cancer Mother  Heart Disease Father        Social History     Socioeconomic History    Marital status: Single     Spouse name: Not on file    Number of children: Not on file    Years of education: Not on file    Highest education level: Not on file   Occupational History    Not on file   Social Needs    Financial resource strain: Not on file    Food insecurity     Worry: Not on file     Inability: Not on file    Transportation needs     Medical: Not on file     Non-medical: Not on file   Tobacco Use    Smoking status: Current Every Day Smoker     Packs/day: 0.50     Years: 44.00     Pack years: 22.00     Types: Cigarettes    Smokeless tobacco: Never Used   Substance and Sexual Activity    Alcohol use: No     Alcohol/week: 0.0 standard drinks    Drug use: No    Sexual activity: Not Currently   Lifestyle    Physical activity     Days per week: Not on file     Minutes per session: Not on file    Stress: Not on file   Relationships    Social connections     Talks on phone: Not on file     Gets together: Not on file     Attends Evangelical service: Not on file     Active member of club or organization: Not on file     Attends meetings of clubs or organizations: Not on file     Relationship status: Not on file    Intimate partner violence     Fear of current or ex partner: Not on file     Emotionally abused: Not on file     Physically abused: Not on file     Forced sexual activity: Not on file   Other Topics Concern    Not on file   Social History Narrative    Not on file       Current Facility-Administered Medications   Medication Dose Route Frequency Provider Last Rate Last Dose    albuterol sulfate  (90 Base) MCG/ACT inhaler 2 puff  2 puff Inhalation Q6H PRN Jaja Kaur, DO        sodium chloride flush 0.9 % injection 10 mL  10 mL Intravenous 2 times per day Jaja Kaur, DO   10 mL at 12/07/20 1104    sodium chloride flush 0.9 % injection 10 mL  10 mL Intravenous PRN aJja Kaur, DO  acetaminophen (TYLENOL) tablet 650 mg  650 mg Oral Q6H PRN Jaja E Vincent, DO        Or    acetaminophen (TYLENOL) suppository 650 mg  650 mg Rectal Q6H PRN Jaja E Vincent, DO        senna (SENOKOT) tablet 8.6 mg  1 tablet Oral Daily PRN Jaja E Vincent, DO        aspirin EC tablet 325 mg  325 mg Oral Daily Jaja E Vincent, DO        atorvastatin (LIPITOR) tablet 20 mg  20 mg Oral Daily Jaja E Vincent, DO        carvedilol (COREG) tablet 6.25 mg  6.25 mg Oral BID WC Jaja E Vincent, DO        clopidogrel (PLAVIX) tablet 75 mg  75 mg Oral Daily Jaja E Vincent, DO        isosorbide mononitrate (IMDUR) extended release tablet 60 mg  60 mg Oral Daily Jaja E Vincent, DO        levothyroxine (SYNTHROID) tablet 150 mcg  150 mcg Oral Daily Jaja E Vincent, DO        montelukast (SINGULAIR) tablet 10 mg  10 mg Oral Nightly Jaja MIMS Vincent, DO        oxyCODONE-acetaminophen (PERCOCET) 5-325 MG per tablet 1 tablet  1 tablet Oral Q4H PRN Jaja E Vincent, DO        pantoprazole (PROTONIX) tablet 40 mg  40 mg Oral QAM AC Jaja MIMS Vincent, DO        polyvinyl alcohol (LIQUIFILM TEARS) 1.4 % ophthalmic solution 1 drop  1 drop Left Eye PRN Jaja E Vincent, DO        sertraline (ZOLOFT) tablet 75 mg  75 mg Oral Daily Jaja MIMS Vincent, DO        albuterol-ipratropium (COMBIVENT RESPIMAT)  MCG/ACT inhaler 1 puff  1 puff Inhalation Q4H PRN Jaja MIMS Vincent, DO        enoxaparin (LOVENOX) injection 40 mg  40 mg Subcutaneous BID Jaja MIMS Vincent, DO   40 mg at 12/07/20 1100    [START ON 12/8/2020] remdesivir 100 mg in sodium chloride 0.9 % 250 mL IVPB  100 mg Intravenous Q24H Jaja MIMS Vincent, DO        0.9 % sodium chloride bolus  30 mL Intravenous PRN Jaja MIMS Vincent, DO        dexamethasone (DECADRON) injection 6 mg  6 mg Intravenous Q6H Grzegorz Bustos MD   6 mg at 12/07/20 1159    insulin lispro (HUMALOG) injection vial 0-6 Units  0-6 Units Subcutaneous TID  Jaja Kaur, DO   3 Units at 12/07/20 1351    insulin lispro (HUMALOG) injection vial 0-3 Units  0-3 Units Subcutaneous Nightly Jaja E Vincent, DO        glucose (GLUTOSE) 40 % oral gel 15 g  15 g Oral PRN Jaja E Vincent, DO        dextrose 50 % IV solution  12.5 g Intravenous PRN Jaja E Vincent, DO        glucagon (rDNA) injection 1 mg  1 mg Intramuscular PRN Jaja E Vincent, DO        dextrose 5 % solution  100 mL/hr Intravenous PRN Jaja E Vincent, DO        miconazole (MICOTIN) 2 % powder   Topical BID Jaja E Vincent, DO        doxycycline hyclate (VIBRAMYCIN) capsule 100 mg  100 mg Oral 2 times per day Jaja E Vincent, DO        insulin glargine (LANTUS) injection vial 20 Units  20 Units Subcutaneous Nightly Jaja E Vincent, DO           No Known Allergies    Review of Systems:  14 point ROS obtained and neg aside from outlined in the HPI    Physical Exam:  BP (!) 128/58   Pulse 74   Temp 97.8 °F (36.6 °C) (Axillary)   Resp (!) 32   Ht 5' 7\" (1.702 m)   Wt 250 lb (113.4 kg)   SpO2 92%   BMI 39.16 kg/m²    General: Lying in bed comfortably, no distress, breathing is not labored on current oxygen  HEENT: PERRL, EOMI, MMM, no oral lesions  Neck: supple, no adenopathy  CV: RRR without murmur  Lungs: exp wheeze bilaterally  Abd: soft, NT, ND, bowel sounds normal  Ext: warm, no edema, no clubbing  Skin: no rashes  Neuro: CN II-XII grossly intact, no focal deficits      Oximetry: 15L HF, SpO2 93%  NIV 16/6 40%    Imaging personally reviewed:  Chest XR     Impression    Diffuse bilateral pneumonia. Echocardiogram:  2015      Summary   Moderate concentric left ventricular hypertrophy was present. Left ventricular regional wall motion and systolic function are normal.   There is doppler evidence for stage Ia diastolic dysfunction. The left atrium is mildly dilated. Mild mitral regurgitation is present.    There is moderate aortic stenosis with a valve area of 1.2 cm2; a maximum   gradient of 28 mmHg and a mean gradient of 17 mmHg. Labs:  11/30/2020  covid +  proBNP 4564  procalcitonin 6.01  CRP 15.4    D dimer 827  IL-6 pending     Labs:  Lab Results   Component Value Date    WBC 14.9 12/07/2020    HGB 11.6 12/07/2020    HCT 36.9 12/07/2020    MCV 83.1 12/07/2020    MCH 26.1 12/07/2020    MCHC 31.4 12/07/2020    RDW 15.9 12/07/2020     12/07/2020    MPV 11.2 12/07/2020     Lab Results   Component Value Date     12/07/2020    K 4.9 12/07/2020     12/07/2020    CO2 19 12/07/2020    BUN 58 12/07/2020    CREATININE 1.5 12/07/2020    LABALBU 2.9 12/07/2020    LABALBU 3.8 12/21/2011    CALCIUM 8.4 12/07/2020    GFRAA 56 12/07/2020    LABGLOM 46 12/07/2020     Lab Results   Component Value Date    PROTIME 16.5 12/06/2020    PROTIME 12.2 12/18/2011    INR 1.4 12/06/2020           Assessment:  1. Acute on chronic hypoxic respiratory failure  2. covid-19 pneumonia  3. Copd with acute exacerbation  4. JOSH  5. Possible superimposed bacterial pneumonia  6. Leukocytosis/lymphopenia  7. PAD   8. DEMARCUS non compliant with PAP  9. Nicotine dependence     Plan:  1. Wean oxygen as tolerated for saturation 90% and greater  2. Nocturnal and prn bipap  3. Check respiratory culture and urine antigens  4. Check IL-6 level  5. Decadron 6mg BID  6. Remdesivir  7. Empiric antibiotics for now - add Rocephin  8. DVT prophylaxis  9. Schedule albuterol 4x daily and add symbicort BID      Thank you for allowing me to participate in the care of Gissel Sam.        Fe Garcia MD  12/7/2020 4:03 PM

## 2020-12-07 NOTE — PROGRESS NOTES
Notified Jamel Nur that pt's home medication list has been updated - requesting order for DNR and BS checks.  to put in orders.

## 2020-12-07 NOTE — ED NOTES
This RN walked into patients room. Patient took off non-rebreather removed IV took off tele leads removed BP cuff and pulse ox. When asked why patient did these things patient stated \"I don't know\".  New IV placed and patient put on high flow nasal cannula cycling every 15 minutes on bp cuff 93% on high flow 15L     Dudley Alexis RN  12/07/20 4313

## 2020-12-07 NOTE — H&P
History & Physicial  12/07/20  Primary Care:  Grisel Salcido, DO  [address] [facility] [address]        Chief Complaint   Patient presents with    Shortness of Breath    Positive For Covid-19       HPI:  Patient is a 66 YO male resident of [address] [place] [address] that was sent in for worsening shortness of breath and found to be significantly hypoxic on his baseline 3 Liters of oxygen at the facility. Patient had just tested positive for COVID-19 at the facility yesterday. He denies any fevers chills, chest pains, nausea vomiting or diarrhea. He was placed on Bipap in ER due to continued Tachypnea with respiratory rate in the 40s. Patient this am saturating well on bipap. He admits to mild shortness of breath. HPI  Prior to Visit Medications    Medication Sig Taking?  Authorizing Provider   clopidogrel (PLAVIX) 75 MG tablet Take 1 tablet by mouth daily  Rivera Hernandez MD   polyvinyl alcohol (LIQUIFILM TEARS) 1.4 % ophthalmic solution Place 1 drop into the left eye as needed  Historical Provider, MD   citalopram (CELEXA) 10 MG tablet Take 10 mg by mouth daily  Historical Provider, MD   famotidine (PEPCID) 20 MG tablet Take 20 mg by mouth 2 times daily  Historical Provider, MD   insulin glargine (LANTUS) 100 UNIT/ML injection vial Inject 43 Units into the skin nightly  Historical Provider, MD   lisinopril (PRINIVIL;ZESTRIL) 10 MG tablet Take 10 mg by mouth daily  Historical Provider, MD   metFORMIN (GLUCOPHAGE) 1000 MG tablet Take 1,000 mg by mouth 2 times daily (with meals)  Historical Provider, MD   levothyroxine (SYNTHROID) 175 MCG tablet Take 150 mcg by mouth Daily   Historical Provider, MD   Insulin Aspart Prot & Aspart (NOVOLOG MIX 70/30 SC) Inject 10 Units into the skin every morning 5 units before dinner  Historical Provider, MD   sertraline (ZOLOFT) 50 MG tablet Take 75 mg by mouth daily Instructed to take am of procedure  Historical Provider, MD   vitamin D (ERGOCALCIFEROL) 42930 UNITS CAPS capsule Take 2,000 Units by mouth daily   Historical Provider, MD   aspirin (ECOTRIN) 325 MG EC tablet Take 325 mg by mouth daily. Do not crush  Nguyễn Provider, MD   oxyCODONE-acetaminophen (PERCOCET) 5-325 MG per tablet   Take 1 tablet by mouth every 4 hours as needed for Pain Instructed to take am of procedure if needed  Historical Provider, MD   acetaminophen 650 MG TABS Take 650 mg by mouth every 4 hours as needed. Burton Gage MD   isosorbide mononitrate (IMDUR) 60 MG CR tablet Take 1 tablet by mouth daily. Burton Gage MD   glucagon, rDNA, 1 MG SOLR injection Inject 1 mg into the muscle as needed for Low blood sugar (Blood glucose less than 70 mg/dL and patient NOT ALERT or NPO and does not have IV access. ). Burton Gage MD   glucose (GLUTOSE) 40 % GEL Take 15 g by mouth as needed. Burton Gage MD   magnesium hydroxide (MILK OF MAGNESIA) 400 MG/5ML suspension Take 30 mLs by mouth daily as needed for Constipation. Burton Gage MD   atorvastatin (LIPITOR) 20 MG tablet Take 20 mg by mouth daily. Historical Provider, MD   carvedilol (COREG) 6.25 MG tablet Take 6.25 mg by mouth 2 times daily (with meals). Instructed to take morning of surgery with a sip of water  Nguyễn Provider, MD   multivitamin (OCUVITE) TABS per tablet Take 1 tablet by mouth daily. Historical Provider, MD   montelukast (SINGULAIR) 10 MG tablet Take 1 tablet by mouth nightly. Stone Emerson MD   pantoprazole (PROTONIX) 40 MG tablet Take 1 tablet by mouth every morning (before breakfast). Stone Emerson MD   clopidogrel (PLAVIX) 75 MG tablet Take 1 tablet by mouth daily.   Burton Gage MD     Social History     Tobacco Use    Smoking status: Current Every Day Smoker     Packs/day: 0.50     Years: 44.00     Pack years: 22.00     Types: Cigarettes    Smokeless tobacco: Never Used   Substance Use Topics    Alcohol use: No     Alcohol/week: 0.0 standard drinks    Drug use: No     Family History   Problem Relation Age of Onset    Cancer Mother     Heart Disease Father      Past Surgical History:   Procedure Laterality Date    CARDIAC SURGERY      CORONARY ANGIOPLASTY WITH STENT PLACEMENT  12/2011    EYE SURGERY Right 8-    right eye cataract extraction with intraocular lens  implant    KNEE SURGERY      OTHER SURGICAL HISTORY Left 2/11/2015    excision and debridement left groin and scrotom    OTHER SURGICAL HISTORY  3/2/15    perineal wound check    OTHER SURGICAL HISTORY Left 3/2/2015    incision and drainage of scrotal abscesses and left fortino abscess by Dr Polo Temple     Past Medical History:   Diagnosis Date    Arthritis     Asthma     Bilateral carotid artery stenosis 10/1/2020    CAD (coronary artery disease) 2001    FREDA to RCA    [facility]     Diabetes mellitus ([facility]     Marcus's gangrene     Hyperlipidemia     Hypothyroidism     DEMARCUS (obstructive sleep apnea)     no cpap or bipap     Pneumonia 2/5/2015    Stroke [facility])      Review of Systems   Constitutional: Positive for activity change and fatigue. Negative for chills and fever. HENT: Negative for ear pain and mouth sores. Eyes: Negative for photophobia and visual disturbance. Respiratory: Positive for shortness of breath. Negative for cough, chest tightness and wheezing. Cardiovascular: Negative for chest pain and leg swelling. Gastrointestinal: Negative for abdominal distention and abdominal pain. Endocrine: Negative for polyphagia and polyuria. Genitourinary: Negative for dysuria and flank pain. Musculoskeletal: Negative for myalgias. Skin: Negative for color change and pallor. Allergic/Immunologic: Negative for environmental allergies and food allergies. Neurological: Negative for speech difficulty and headaches. Hematological: Negative for adenopathy. Does not bruise/bleed easily. Psychiatric/Behavioral: Negative for agitation and confusion.        Vitals: 12/07/20 0500   BP: (!) 141/60   Pulse: 75   Resp: 25   Temp: 98.9 °F (37.2 °C)   SpO2: 96%       Physical Exam  Constitutional:       General: He is not in acute distress. Appearance: Normal appearance. He is not ill-appearing. HENT:      Head: Normocephalic and atraumatic. Right Ear: External ear normal.      Left Ear: External ear normal.      Nose: Nose normal. No congestion. Mouth/Throat:      Mouth: Mucous membranes are moist.      Pharynx: No oropharyngeal exudate. Neck:      Musculoskeletal: Neck supple. No neck rigidity. Cardiovascular:      Rate and Rhythm: Normal rate and regular rhythm. Heart sounds: No murmur. No gallop. Pulmonary:      Effort: Pulmonary effort is normal.      Breath sounds: No wheezing, rhonchi or rales. Abdominal:      General: Bowel sounds are normal. There is no distension. Palpations: Abdomen is soft. Genitourinary:     Penis: Normal.       Comments: Erythema in bilateral groins with irritated follicles on scrotum. Musculoskeletal:      Right lower leg: No edema. Left lower leg: No edema. Skin:     General: Skin is warm and dry. Capillary Refill: Capillary refill takes less than 2 seconds. Findings: Rash present. Neurological:      General: No focal deficit present. Mental Status: He is alert. Psychiatric:         Mood and Affect: Mood normal.         Behavior: Behavior normal.         Active Problems:    Acute respiratory failure with hypoxia (HCC)  Resolved Problems:    * No resolved hospital problems. *  1. Acute on chronic Hypoxic Respiratory failure  2. COVID-19 Pneumonia  3. JOSH   4. Elevated LFTs  5. Diabetes Mellitus   6. COPD  7. CAD  8. Hypertension  9. Fungal dermatitis    Plan:  Admit to inpatient. Continue on Bipap. Continue on Dexamethasone and Remdesivir. Consult pulmonary for possible toclizumab.  Will clarify code status with family later this am.     DVT Prophylaxis: Lovenox  Code Status: Full for now. Will clarify with family later today.      Rehan Irwin DO    Electronically signed by Rehan Irwin DO on 12/7/2020 at 4:57 AM

## 2020-12-07 NOTE — ED NOTES
Patient unable to urinate at this time; instructed to use call light when able to urinate.  Urinal at bedside     Reji Wilson RN  12/07/20 0001

## 2020-12-07 NOTE — ED NOTES
Bed: 04  Expected date:   Expected time:   Means of arrival:   Comments:  EMS SOB +CASH Cavanaugh RN  75/83/04 [id number]

## 2020-12-07 NOTE — ED PROVIDER NOTES
[facility] MED SURG/TELE  EMERGENCY DEPARTMENT ENCOUNTER      Pt Name: Alejandrina Ramires  MRN: [id number]  [place] 1950  Date of evaluation: 12/6/2020      CHIEF COMPLAINT       Chief Complaint   Patient presents with    Shortness of Breath    Positive For Covid-19        HPI  Alejandrina Ramires is a 65 y.o. male with a past medical history of COPD on 3 L nasal cannula at home CAD, diabetes recently tested positive for Covid on 12/6/2020 presents with complaints of acute shortness of breath. Patient was at his assisted living facility when he was found to be tachypneic and in respiratory distress. The pulse ox of the patient specifically was 86% on patient's 5 L nasal cannula. When EMS arrived they placed patient on nonrebreather and desaturated 96%. No alleviating or exacerbating factors. Supplemental oxygen helped patient shortness of breath. Patient admits to no other symptoms. Denies any fevers, chills, nausea, vomiting, chest pain, abdominal pain, flank pain, dysuria, hematuria, diarrhea, constipation, new rashes or sores. Except as noted above the remainder of the review of systems was reviewed and negative. Review of Systems   Constitutional: Negative for activity change, appetite change, diaphoresis, fatigue, fever and unexpected weight change. HENT: Negative for congestion, trouble swallowing and voice change. Eyes: Negative for visual disturbance. Respiratory: Positive for shortness of breath. Cardiovascular: Negative for chest pain, palpitations and leg swelling. Gastrointestinal: Negative for abdominal distention, abdominal pain, constipation, diarrhea, nausea and vomiting. Endocrine: Negative for polyuria. Genitourinary: Negative for dysuria. Musculoskeletal: Negative for neck pain and neck stiffness. Skin: Negative for color change, pallor, rash and wound. Neurological: Negative for dizziness. Hematological: Negative for adenopathy. Does not bruise/bleed easily. Psychiatric/Behavioral: Negative for agitation. Physical Exam  Constitutional:       General: He is in acute distress. Appearance: He is well-developed. He is obese. He is not ill-appearing or diaphoretic. Interventions: He is not intubated. HENT:      Head: Normocephalic and atraumatic. Eyes:      Extraocular Movements: Extraocular movements intact. Pupils: Pupils are equal, round, and reactive to light. Neck:      Musculoskeletal: Normal range of motion and neck supple. Cardiovascular:      Rate and Rhythm: Regular rhythm. Tachycardia present. Pulmonary:      Effort: Tachypnea and respiratory distress present. No accessory muscle usage. He is not intubated. Breath sounds: Examination of the right-upper field reveals rhonchi. Examination of the left-upper field reveals rhonchi. Examination of the right-middle field reveals rhonchi. Examination of the left-middle field reveals rhonchi. Examination of the right-lower field reveals rhonchi. Examination of the left-lower field reveals rhonchi. Rhonchi present. No decreased breath sounds, wheezing or rales. Chest:      Chest wall: No mass, deformity or tenderness. Abdominal:      Palpations: Abdomen is soft. Tenderness: There is no abdominal tenderness. There is no guarding or rebound. Musculoskeletal: Normal range of motion. Right lower leg: He exhibits no tenderness. No edema. Left lower leg: He exhibits no tenderness. No edema. Skin:     General: Skin is warm and dry. Capillary Refill: Capillary refill takes less than 2 seconds. Neurological:      General: No focal deficit present. Mental Status: He is alert and oriented to person, place, and time. Cranial Nerves: No cranial nerve deficit. Motor: No weakness.    Psychiatric:         Mood and Affect: Mood normal.          Procedures     MDM  Number of Diagnoses or Management Options  Acute respiratory failure with hypoxia ([facility] [address]): Pneumonia due to COVID-19 virus:   Diagnosis management comments: 57-year-old male with a past medical history of COPD on 3 L nasal cannula presents from assisted living facility with acute respiratory distress and hypoxia. Patient was recently tested for Covid on 12 6 and tested positive. On physical exam patient is in mild respiratory distress requiring supplemental oxygen. Been placed on the 15 L nonrebreather saturating at 96%. He is afebrile. On physical exam patient tachypneic, conversationally dyspneic, with rhonchorous breath sounds on lower lung bases. Normal S1-S2. Irregular heartbeat. Abdomen soft nontender, no rebound or guarding. Negative CVA tenderness bilaterally. No bilateral leg edema. Diagnostic labs significant for leukocytosis as well as a troponin of 0.04. Patient does have an JOSH. During patient's evaluation he was placed on a high flow nasal cannula after he was placed on nonrebreather. Patient took off his supplemental oxygen as he was agitated during his evaluation. He was found tachypneic and in respiratory distress. BiPAP was ordered. ABG is consistent with respiratory acidosis. After BiPAP was placed patient became more comfortable. And decreased work of breathing. Patient will be admitted to intermediate for acute respiratory distress and hypoxia secondary to Covid pneumonia. Dr. Fara Gerard admited patient for Dr. Rufino Mueller. ED Course as of Dec 07 0857   Mon Dec 07, 2020   3139 Patient ripped off his high flow. An IV. He is still rhonchorous breath sounds. And tachypneic after breathing treatments. Patient be placed on BiPAP. Resp(!): 40 [JV]   0105 Patient tolerating BiPAP. Decreased work of breathing. Patient looks much more comfortable. Resp: 29 [JV]   12 Spoke with Dr. Fara Gerard. Patient admitted to the hospital.  Disposition to ICU versus intermediate floor still pending.   From patient's elevated respiratory rate after he removed his high flow and IV lines patient was placed on BiPAP. ABG pending. [JV]   0208 Blood Gas, Arterial(!):    Date Analyzed 20201207   Time Analyzed 0152   Source: Blood Arterial   pH, Blood Gas 7.383   PCO2 36.0   pO2 143.6(!)   HCO3 21.0(!)   Base Excess -3.5(!)   O2 Sat 99.0(!)   PO2/FIO2 2.05   AaDO2 299.3   RI(T) 208   O2Hb 97.8(!)   COHb 0.9   MetHb 0.3   O2 Content 17.9   HHb 1.0   tHb (est) 12.8   Mode BIPAP   FIO2 70.0   Peep/Cpap 6.0   PS 16   Pt Temp 37.0    ID [id number]   Lab [id number]   Critical(s) Notified . No Critical Values [JV]      ED Course User Index  [JV] Sunil Clayton MD           --------------------------------------------- PAST HISTORY ---------------------------------------------  Past Medical History:  has a past medical history of Arthritis, Asthma, Bilateral carotid artery stenosis, CAD (coronary artery disease), COPD (chronic obstructive pulmonary disease) ([facility] [address] Diabetes mellitus ([facility] [address] Marcus's gangrene, Hyperlipidemia, Hypothyroidism, DEMARCUS (obstructive sleep apnea), Pneumonia, and Stroke ([facility] [address] Past Surgical History:  has a past surgical history that includes Cardiac surgery; Coronary angioplasty with stent (12/2011); knee surgery; other surgical history (Left, 2/11/2015); other surgical history (3/2/15); other surgical history (Left, 3/2/2015); and eye surgery (Right, 8-). Social History:  reports that he has been smoking cigarettes. He has a 22.00 pack-year smoking history. He has never used smokeless tobacco. He reports that he does not drink alcohol or use drugs. Family History: family history includes Cancer in his mother; Heart Disease in his father. The patients home medications have been reviewed. Allergies: Patient has no known allergies.     -------------------------------------------------- RESULTS -------------------------------------------------    LABS:  Results for orders placed or performed during the hospital encounter of 12/06/20   CBC Auto Differential Result Value Ref Range    WBC 15.2 (H) 4.5 - 11.5 E9/L    RBC 4.77 3.80 - 5.80 E12/L    Hemoglobin 12.4 (L) 12.5 - 16.5 g/dL    Hematocrit 38.8 37.0 - 54.0 %    MCV 81.3 80.0 - 99.9 fL    MCH 26.0 26.0 - 35.0 pg    MCHC 32.0 32.0 - 34.5 %    RDW 15.8 (H) 11.5 - 15.0 fL    Platelets 195 590 - 735 E9/L    MPV 10.8 7.0 - 12.0 fL    Neutrophils % 94.5 (H) 43.0 - 80.0 %    Immature Granulocytes % 0.5 0.0 - 5.0 %    Lymphocytes % 2.1 (L) 20.0 - 42.0 %    Monocytes % 2.8 2.0 - 12.0 %    Eosinophils % 0.0 0.0 - 6.0 %    Basophils % 0.1 0.0 - 2.0 %    Neutrophils Absolute 14.38 (H) 1.80 - 7.30 E9/L    Immature Granulocytes # 0.07 E9/L    Lymphocytes Absolute 0.32 (L) 1.50 - 4.00 E9/L    Monocytes Absolute 0.42 0.10 - 0.95 E9/L    Eosinophils Absolute 0.00 (L) 0.05 - 0.50 E9/L    Basophils Absolute 0.01 0.00 - 0.20 E9/L    Poikilocytes 1+     Ovalocytes 1+     Tear Drop Cells 1+    Comprehensive Metabolic Panel   Result Value Ref Range    Sodium 138 132 - 146 mmol/L    Potassium 4.5 3.5 - 5.0 mmol/L    Chloride 102 98 - 107 mmol/L    CO2 22 22 - 29 mmol/L    Anion Gap 14 7 - 16 mmol/L    Glucose 101 (H) 74 - 99 mg/dL    BUN 50 (H) 8 - 23 mg/dL    CREATININE 1.5 (H) 0.7 - 1.2 mg/dL    GFR Non-African American 46 >=60 mL/min/1.73    GFR African American 56     Calcium 8.7 8.6 - 10.2 mg/dL    Total Protein 6.5 6.4 - 8.3 g/dL    Alb 3.5 3.5 - 5.2 g/dL    Total Bilirubin 1.6 (H) 0.0 - 1.2 mg/dL    Alkaline Phosphatase 129 40 - 129 U/L    ALT 41 (H) 0 - 40 U/L    AST 44 (H) 0 - 39 U/L   Lactic Acid, Plasma   Result Value Ref Range    Lactic Acid 1.8 0.5 - 2.2 mmol/L   Troponin   Result Value Ref Range    Troponin 0.04 (H) 0.00 - 0.03 ng/mL   Brain Natriuretic Peptide   Result Value Ref Range    Pro-BNP 4,564 (H) 0 - 125 pg/mL   Magnesium   Result Value Ref Range    Magnesium 1.7 1.6 - 2.6 mg/dL   Procalcitonin   Result Value Ref Range    Procalcitonin 6.01 (H) 0.00 - 0.08 ng/mL   Protime-INR   Result Value Ref Range    Protime 16. 5 (H) 9.3 - 12.4 sec    INR 1.4    Fibrinogen   Result Value Ref Range    Fibrinogen >700 (H) 225 - 540 mg/dL   APTT   Result Value Ref Range    aPTT 32.7 24.5 - 35.1 sec   C-Reactive Protein   Result Value Ref Range    CRP 15.4 (H) 0.0 - 0.4 mg/dL   Lactate Dehydrogenase   Result Value Ref Range     (H) 135 - 225 U/L   D-Dimer, Quantitative   Result Value Ref Range    D-Dimer, Quant 827 ng/mL DDU   Comprehensive Metabolic Panel w/ Reflex to MG   Result Value Ref Range    Sodium 139 132 - 146 mmol/L    Potassium reflex Magnesium 4.9 3.5 - 5.0 mmol/L    Chloride 106 98 - 107 mmol/L    CO2 19 (L) 22 - 29 mmol/L    Anion Gap 14 7 - 16 mmol/L    Glucose 213 (H) 74 - 99 mg/dL    BUN 58 (H) 8 - 23 mg/dL    CREATININE 1.5 (H) 0.7 - 1.2 mg/dL    GFR Non-African American 46 >=60 mL/min/1.73    GFR African American 56     Calcium 8.4 (L) 8.6 - 10.2 mg/dL    Total Protein 6.5 6.4 - 8.3 g/dL    Alb 2.9 (L) 3.5 - 5.2 g/dL    Total Bilirubin 1.4 (H) 0.0 - 1.2 mg/dL    Alkaline Phosphatase 119 40 - 129 U/L    ALT 41 (H) 0 - 40 U/L    AST 40 (H) 0 - 39 U/L   CBC auto differential   Result Value Ref Range    WBC 14.9 (H) 4.5 - 11.5 E9/L    RBC 4.44 3.80 - 5.80 E12/L    Hemoglobin 11.6 (L) 12.5 - 16.5 g/dL    Hematocrit 36.9 (L) 37.0 - 54.0 %    MCV 83.1 80.0 - 99.9 fL    MCH 26.1 26.0 - 35.0 pg    MCHC 31.4 (L) 32.0 - 34.5 %    RDW 15.9 (H) 11.5 - 15.0 fL    Platelets 741 650 - 163 E9/L    MPV 11.2 7.0 - 12.0 fL    Neutrophils % 94.5 (H) 43.0 - 80.0 %    Immature Granulocytes % 0.5 0.0 - 5.0 %    Lymphocytes % 2.7 (L) 20.0 - 42.0 %    Monocytes % 2.3 2.0 - 12.0 %    Eosinophils % 0.0 0.0 - 6.0 %    Basophils % 0.0 0.0 - 2.0 %    Neutrophils Absolute 14.11 (H) 1.80 - 7.30 E9/L    Immature Granulocytes # 0.08 E9/L    Lymphocytes Absolute 0.41 (L) 1.50 - 4.00 E9/L    Monocytes Absolute 0.34 0.10 - 0.95 E9/L    Eosinophils Absolute 0.00 (L) 0.05 - 0.50 E9/L    Basophils Absolute 0.00 0.00 - 0.20 E9/L    Anisocytosis 1+ Polychromasia 1+     Poikilocytes 2+     Jeovanny Cells 2+     Ovalocytes 2+    Blood Gas, Arterial   Result Value Ref Range    Date Analyzed 20201207     Time Analyzed 0152     Source: Blood Arterial     pH, Blood Gas 7.383 7.350 - 7.450    PCO2 36.0 35.0 - 45.0 mmHg    PO2 143.6 (H) 75.0 - 100.0 mmHg    HCO3 21.0 (L) 22.0 - 26.0 mmol/L    B.E. -3.5 (L) -3.0 - 3.0 mmol/L    O2 Sat 99.0 (H) 92.0 - 98.5 %    PO2/FIO2 2.05 mmHg/%    AaDO2 299.3 mmHg    RI(T) 208 %    O2Hb 97.8 (H) 94.0 - 97.0 %    COHb 0.9 0.0 - 1.5 %    MetHb 0.3 0.0 - 1.5 %    O2 Content 17.9 mL/dL    HHb 1.0 0.0 - 5.0 %    tHb (est) 12.8 11.5 - 16.5 g/dL    Mode BIPAP     FIO2 70.0 %    Peep/Cpap 6.0 cmH2O    PS 16 cmH20    Date Of Collection      Time Collected      Pt Temp 37.0 C     ID [id number]     Lab [id number]     Critical(s) Notified . No Critical Values    Troponin   Result Value Ref Range    Troponin 0.05 (H) 0.00 - 0.03 ng/mL   POCT Glucose   Result Value Ref Range    Meter Glucose 209 (H) 74 - 99 mg/dL   EKG 12 Lead   Result Value Ref Range    Ventricular Rate 105 BPM    Atrial Rate 105 BPM    P-R Interval 188 ms    QRS Duration 140 ms    Q-T Interval 336 ms    QTc Calculation (Bazett) 444 ms    P Axis 62 degrees    R Axis -53 degrees    T Axis 35 degrees       RADIOLOGY:  XR CHEST PORTABLE   Final Result   Diffuse bilateral pneumonia. EKG: This EKG is signed and interpreted by me. EKG read by me. Heart rate 105. Sinus rhythm with PACs. The right bundle branch block, left anterior fascicular block. No QTC prolongation. No ST elevations or depressions. Nonspecific changes compared to previous EKG on 9/23/2020      ------------------------- NURSING NOTES AND VITALS REVIEWED ---------------------------  Date / Time Roomed:  12/6/2020 10:22 PM  ED Bed Assignment:  [id number]    The nursing notes within the ED encounter and vital signs as below have been reviewed.      Patient Vitals for the past 24 hrs:   BP Temp Temp src Pulse Resp SpO2 Height Weight   12/07/20 0810 -- -- -- -- 21 -- -- --   12/07/20 0500 (!) 141/60 98.9 °F (37.2 °C) Axillary 75 25 96 % -- --   12/07/20 0418 115/62 99 °F (37.2 °C) Axillary 82 26 94 % -- --   12/07/20 0301 (!) 131/57 -- -- 80 26 98 % -- --   12/07/20 0228 (!) 101/55 98.9 °F (37.2 °C) Oral 82 26 99 % -- --   12/07/20 0226 -- -- -- -- 26 -- -- --   12/07/20 0102 -- -- -- -- 28 -- -- --   12/07/20 0042 115/65 -- -- 98 (!) 40 92 % -- --   12/06/20 2336 -- -- -- -- -- 98 % -- --   12/06/20 2322 (!) 170/73 -- -- 97 (!) 40 97 % -- --   12/06/20 2215 (!) 186/101 100.2 °F (37.9 °C) Oral 110 18 94 % 5' 7\" (1.702 m) 250 lb (113.4 kg)       Oxygen Saturation Interpretation: Improved after treatment    ------------------------------------------ PROGRESS NOTES ------------------------------------------    Counseling:  I have spoken with the patient and discussed todays results, in addition to providing specific details for the plan of care and counseling regarding the diagnosis and prognosis. Their questions are answered at this time and they are agreeable with the plan of admission.    --------------------------------- ADDITIONAL PROVIDER NOTES ---------------------------------  Consultations:   Spoke with Dr. Maegan Vela. Discussed case. They will admit the patient. This patient's ED course included: a personal history and physicial examination, re-evaluation prior to disposition, multiple bedside re-evaluations, IV medications, cardiac monitoring and continuous pulse oximetry    This patient has remained hemodynamically stable during their ED course. Diagnosis:  1. Acute respiratory failure with hypoxia ([address]    2. Pneumonia due to COVID-19 virus        Disposition:  Patient's disposition: Admit to telemetry  Patient's condition is fair.           Paul Motta MD  Resident  12/07/20 2275

## 2020-12-07 NOTE — PROGRESS NOTES
Attention Dr Caleb Kaur: This patient has a CrCl of 54 mL/min, a current BMI of 39.16 kg/m2, and is currently ordered Lovenox 30 mg twice a day. According to the [address] [place] [address] Dr COVID 19 Treatment Summary, Group C patients with a BMI greater than or equal to 30 kg/m2 and a CrCl greater than or equal to 30 mL/min, the recommended dose for Lovenox for DVT prophylaxis is Lovenox 40 mg twice a day.   Tammy Bedoya, [address] [place] [address]  12/7/2020  1:10 AM

## 2020-12-07 NOTE — PROGRESS NOTES
Called patient's sister Sherre Lesches to discuss Code status as paper from [facility] did not lonnie CCA vs CC. No answer. Left message to contact the floor.      Olivia Kaur, DO

## 2020-12-07 NOTE — ED NOTES
Patient unable to urinate at this time; instructed to use call light when able to urinate.      Kellee Hernandez RN  12/07/20 3750

## 2020-12-07 NOTE — ED NOTES
Report received from Anabel Sommers, [place].      Olivia Bass, GRAHAM  12/07/20 9011

## 2020-12-08 LAB
ALBUMIN SERPL-MCNC: 3.1 G/DL (ref 3.5–5.2)
ALP BLD-CCNC: 128 U/L (ref 40–129)
ALT SERPL-CCNC: 41 U/L (ref 0–40)
ANION GAP SERPL CALCULATED.3IONS-SCNC: 10 MMOL/L (ref 7–16)
AST SERPL-CCNC: 33 U/L (ref 0–39)
BASOPHILS ABSOLUTE: 0.01 E9/L (ref 0–0.2)
BASOPHILS RELATIVE PERCENT: 0.1 % (ref 0–2)
BILIRUB SERPL-MCNC: 1.1 MG/DL (ref 0–1.2)
BUN BLDV-MCNC: 67 MG/DL (ref 8–23)
BURR CELLS: ABNORMAL
CALCIUM SERPL-MCNC: 9 MG/DL (ref 8.6–10.2)
CHLORIDE BLD-SCNC: 107 MMOL/L (ref 98–107)
CO2: 22 MMOL/L (ref 22–29)
CREAT SERPL-MCNC: 1.3 MG/DL (ref 0.7–1.2)
EOSINOPHILS ABSOLUTE: 0 E9/L (ref 0.05–0.5)
EOSINOPHILS RELATIVE PERCENT: 0 % (ref 0–6)
GFR AFRICAN AMERICAN: >60
GFR NON-AFRICAN AMERICAN: 54 ML/MIN/1.73
GLUCOSE BLD-MCNC: 211 MG/DL (ref 74–99)
HCT VFR BLD CALC: 39.6 % (ref 37–54)
HEMOGLOBIN: 12.4 G/DL (ref 12.5–16.5)
IMMATURE GRANULOCYTES #: 0.05 E9/L
IMMATURE GRANULOCYTES %: 0.4 % (ref 0–5)
L. PNEUMOPHILA SEROGP 1 UR AG: NORMAL
LYMPHOCYTES ABSOLUTE: 0.59 E9/L (ref 1.5–4)
LYMPHOCYTES RELATIVE PERCENT: 4.3 % (ref 20–42)
MCH RBC QN AUTO: 25.9 PG (ref 26–35)
MCHC RBC AUTO-ENTMCNC: 31.3 % (ref 32–34.5)
MCV RBC AUTO: 82.8 FL (ref 80–99.9)
METER GLUCOSE: 245 MG/DL (ref 74–99)
METER GLUCOSE: 250 MG/DL (ref 74–99)
METER GLUCOSE: 296 MG/DL (ref 74–99)
METER GLUCOSE: 301 MG/DL (ref 74–99)
MONOCYTES ABSOLUTE: 0.28 E9/L (ref 0.1–0.95)
MONOCYTES RELATIVE PERCENT: 2 % (ref 2–12)
NEUTROPHILS ABSOLUTE: 12.76 E9/L (ref 1.8–7.3)
NEUTROPHILS RELATIVE PERCENT: 93.2 % (ref 43–80)
OVALOCYTES: ABNORMAL
PDW BLD-RTO: 15.9 FL (ref 11.5–15)
PLATELET # BLD: 321 E9/L (ref 130–450)
PMV BLD AUTO: 10.9 FL (ref 7–12)
POIKILOCYTES: ABNORMAL
POLYCHROMASIA: ABNORMAL
POTASSIUM REFLEX MAGNESIUM: 4.9 MMOL/L (ref 3.5–5)
RBC # BLD: 4.78 E12/L (ref 3.8–5.8)
SODIUM BLD-SCNC: 139 MMOL/L (ref 132–146)
TOTAL PROTEIN: 7.1 G/DL (ref 6.4–8.3)
WBC # BLD: 13.7 E9/L (ref 4.5–11.5)

## 2020-12-08 PROCEDURE — 2700000000 HC OXYGEN THERAPY PER DAY

## 2020-12-08 PROCEDURE — 6370000000 HC RX 637 (ALT 250 FOR IP): Performed by: INTERNAL MEDICINE

## 2020-12-08 PROCEDURE — 87449 NOS EACH ORGANISM AG IA: CPT

## 2020-12-08 PROCEDURE — 82962 GLUCOSE BLOOD TEST: CPT

## 2020-12-08 PROCEDURE — 6360000002 HC RX W HCPCS: Performed by: INTERNAL MEDICINE

## 2020-12-08 PROCEDURE — 6360000002 HC RX W HCPCS: Performed by: STUDENT IN AN ORGANIZED HEALTH CARE EDUCATION/TRAINING PROGRAM

## 2020-12-08 PROCEDURE — 80053 COMPREHEN METABOLIC PANEL: CPT

## 2020-12-08 PROCEDURE — 99222 1ST HOSP IP/OBS MODERATE 55: CPT | Performed by: NURSE PRACTITIONER

## 2020-12-08 PROCEDURE — 2580000003 HC RX 258: Performed by: INTERNAL MEDICINE

## 2020-12-08 PROCEDURE — 85025 COMPLETE CBC W/AUTO DIFF WBC: CPT

## 2020-12-08 PROCEDURE — 2060000000 HC ICU INTERMEDIATE R&B

## 2020-12-08 PROCEDURE — 94660 CPAP INITIATION&MGMT: CPT

## 2020-12-08 PROCEDURE — 2500000003 HC RX 250 WO HCPCS: Performed by: INTERNAL MEDICINE

## 2020-12-08 PROCEDURE — 36415 COLL VENOUS BLD VENIPUNCTURE: CPT

## 2020-12-08 RX ADMIN — PANTOPRAZOLE SODIUM 40 MG: 40 TABLET, DELAYED RELEASE ORAL at 09:03

## 2020-12-08 RX ADMIN — Medication 10 ML: at 11:07

## 2020-12-08 RX ADMIN — DEXAMETHASONE SODIUM PHOSPHATE 6 MG: 4 INJECTION, SOLUTION INTRA-ARTICULAR; INTRALESIONAL; INTRAMUSCULAR; INTRAVENOUS; SOFT TISSUE at 16:58

## 2020-12-08 RX ADMIN — INSULIN LISPRO 3 UNITS: 100 INJECTION, SOLUTION INTRAVENOUS; SUBCUTANEOUS at 17:22

## 2020-12-08 RX ADMIN — SERTRALINE 75 MG: 50 TABLET, FILM COATED ORAL at 09:03

## 2020-12-08 RX ADMIN — REMDESIVIR 100 MG: 100 INJECTION, POWDER, LYOPHILIZED, FOR SOLUTION INTRAVENOUS at 11:06

## 2020-12-08 RX ADMIN — MONTELUKAST SODIUM 10 MG: 10 TABLET, FILM COATED ORAL at 20:27

## 2020-12-08 RX ADMIN — WATER 1 G: 1 INJECTION INTRAMUSCULAR; INTRAVENOUS; SUBCUTANEOUS at 16:59

## 2020-12-08 RX ADMIN — CARVEDILOL 6.25 MG: 6.25 TABLET, FILM COATED ORAL at 09:03

## 2020-12-08 RX ADMIN — INSULIN LISPRO 2 UNITS: 100 INJECTION, SOLUTION INTRAVENOUS; SUBCUTANEOUS at 09:17

## 2020-12-08 RX ADMIN — Medication 1000 MG: at 20:26

## 2020-12-08 RX ADMIN — INSULIN GLARGINE 20 UNITS: 100 INJECTION, SOLUTION SUBCUTANEOUS at 20:27

## 2020-12-08 RX ADMIN — DEXAMETHASONE SODIUM PHOSPHATE 6 MG: 4 INJECTION, SOLUTION INTRA-ARTICULAR; INTRALESIONAL; INTRAMUSCULAR; INTRAVENOUS; SOFT TISSUE at 21:00

## 2020-12-08 RX ADMIN — ALBUTEROL SULFATE 2 PUFF: 90 AEROSOL, METERED RESPIRATORY (INHALATION) at 09:02

## 2020-12-08 RX ADMIN — ALBUTEROL SULFATE 2 PUFF: 90 AEROSOL, METERED RESPIRATORY (INHALATION) at 17:02

## 2020-12-08 RX ADMIN — DEXAMETHASONE SODIUM PHOSPHATE 6 MG: 4 INJECTION, SOLUTION INTRA-ARTICULAR; INTRALESIONAL; INTRAMUSCULAR; INTRAVENOUS; SOFT TISSUE at 11:06

## 2020-12-08 RX ADMIN — MICONAZOLE NITRATE: 20 POWDER TOPICAL at 21:01

## 2020-12-08 RX ADMIN — ALBUTEROL SULFATE 2 PUFF: 90 AEROSOL, METERED RESPIRATORY (INHALATION) at 12:37

## 2020-12-08 RX ADMIN — DOXYCYCLINE HYCLATE 100 MG: 100 CAPSULE ORAL at 09:03

## 2020-12-08 RX ADMIN — BUDESONIDE AND FORMOTEROL FUMARATE DIHYDRATE 2 PUFF: 160; 4.5 AEROSOL RESPIRATORY (INHALATION) at 09:04

## 2020-12-08 RX ADMIN — LEVOTHYROXINE SODIUM 150 MCG: 75 TABLET ORAL at 09:02

## 2020-12-08 RX ADMIN — Medication 10 ML: at 21:01

## 2020-12-08 RX ADMIN — ATORVASTATIN CALCIUM 20 MG: 20 TABLET, FILM COATED ORAL at 20:27

## 2020-12-08 RX ADMIN — ALBUTEROL SULFATE 2 PUFF: 90 AEROSOL, METERED RESPIRATORY (INHALATION) at 21:00

## 2020-12-08 RX ADMIN — ZINC SULFATE 220 MG (50 MG) CAPSULE 50 MG: CAPSULE at 09:03

## 2020-12-08 RX ADMIN — Medication 10 ML: at 11:06

## 2020-12-08 RX ADMIN — INSULIN LISPRO 2 UNITS: 100 INJECTION, SOLUTION INTRAVENOUS; SUBCUTANEOUS at 20:28

## 2020-12-08 RX ADMIN — ENOXAPARIN SODIUM 40 MG: 40 INJECTION SUBCUTANEOUS at 20:29

## 2020-12-08 RX ADMIN — CARVEDILOL 6.25 MG: 6.25 TABLET, FILM COATED ORAL at 16:59

## 2020-12-08 RX ADMIN — DEXAMETHASONE SODIUM PHOSPHATE 6 MG: 4 INJECTION, SOLUTION INTRA-ARTICULAR; INTRALESIONAL; INTRAMUSCULAR; INTRAVENOUS; SOFT TISSUE at 05:25

## 2020-12-08 RX ADMIN — MICONAZOLE NITRATE: 20 POWDER TOPICAL at 11:07

## 2020-12-08 RX ADMIN — DOXYCYCLINE HYCLATE 100 MG: 100 CAPSULE ORAL at 20:27

## 2020-12-08 RX ADMIN — INSULIN LISPRO 3 UNITS: 100 INJECTION, SOLUTION INTRAVENOUS; SUBCUTANEOUS at 11:18

## 2020-12-08 RX ADMIN — BUDESONIDE AND FORMOTEROL FUMARATE DIHYDRATE 2 PUFF: 160; 4.5 AEROSOL RESPIRATORY (INHALATION) at 20:59

## 2020-12-08 RX ADMIN — CLOPIDOGREL BISULFATE 75 MG: 75 TABLET ORAL at 09:03

## 2020-12-08 RX ADMIN — ASPIRIN 325 MG: 325 TABLET, COATED ORAL at 09:03

## 2020-12-08 RX ADMIN — ISOSORBIDE MONONITRATE 60 MG: 60 TABLET, EXTENDED RELEASE ORAL at 09:02

## 2020-12-08 RX ADMIN — ENOXAPARIN SODIUM 40 MG: 40 INJECTION SUBCUTANEOUS at 09:02

## 2020-12-08 RX ADMIN — Medication 1000 MG: at 09:02

## 2020-12-08 ASSESSMENT — PAIN SCALES - GENERAL
PAINLEVEL_OUTOF10: 0
PAINLEVEL_OUTOF10: 0

## 2020-12-08 NOTE — CONSULTS
Palliative Care Department  [phone]  Palliative Care Initial Consult  [place] [facility] [place]  [id number]  Hospital Day: 3  Date of Initial Consult: 12/8/2020  Referring Provider: Earlene Rowland DO   Palliative Medicine was consulted for assistance with: Goals of Care, Code Status Discussion     HPI:   Jose Juan Hernadez is a 65 y.o. with a medical history of COPD on 3 L nasal cannula, CAD, DM who was admitted on 12/6/2020 from nursing facility with a CHIEF COMPLAINT of tachypnea and respiratory distress. Patient found to be positive for COVID-19. CXR with bilateral infiltrates. Patient admitted to telemetry for further work-up and management. Palliative medicine consulted for goals of care and CODE STATUS discussion. ASSESSMENT/PLAN:     Pertinent Hospital Diagnoses      Acute on chronic hypoxic respiratory failure: hx COPD, chronically on 3 L O2, pulmonology following, wean O2 as able, BiPAP as needed   COVID-19 pneumonia: Decadron, remdesivir, wean oxygen as able   JOSH: BUN 67, creatinine 1.3 today   Hx DM   Hx COPD   Hx CAD      Palliative Care Encounter / Counseling Regarding Goals of Care  Please see detailed goals of care discussion as below   At this time, Jose Juan Hernadez, Does Not have capacity for medical decision-making.   Capacity is time limited and situation/question specific   During encounter, Goyo Anne, was surrogate medical decision-maker   Outcome of goals of care meeting: Continue current management, Mehnaz Stauffer states she would like to speak to the rest of family to discuss intubation   Code status DNR-CCA   Advanced Directives: POA in epic   Surrogate/Legal NOK:  Lane Hamilton, sister, [phone], [phone]      Spiritual assessment: no spiritual distress identified  Bereavement and grief: to be determined  Referrals to: none today  SUBJECTIVE:     Current medical issues leading to Palliative Medicine involvement include   Active Hospital Problems 50 minutes in counseling and coordination of care at the bedside regarding goals of care, diagnosis and prognosis and see above. Thank you for allowing Palliative Medicine to participate in the care of Kanika Pineda. Note: This report was completed using computer[employer] voiced recognition software. Every effort has been made to ensure accuracy; however, inadvertent computerized transcription errors may be present.

## 2020-12-08 NOTE — PROGRESS NOTES
Date: 12/7/2020    Time: 10:09 PM    Patient Placed On BIPAP/CPAP/ Non-Invasive Ventilation? Yes    If no must comment. Facial area red/color change? No           If YES are Blister/Lesion present? No   If yes must notify nursing staff  BIPAP/CPAP skin barrier?   Yes    Skin barrier type:mepilexlite       Comments:        Ivet Kruger

## 2020-12-08 NOTE — PROGRESS NOTES
Pulmonary Subsequent Hospital F/U note    Patient is being followed for: covid-19 pneumonia, acute hypoxic respiratory failure     Interval HPI:  Appears comfortable on 15L HF  No new issues reported  Wore BiPAP 50% 16/6 last night     ROS:  Denies fever, night sweats  +cough and dyspnea, no wheeze  Denies chest pain, edema, palpitations  Denies nausea    Exam:  /68   Pulse 61   Temp 97.1 °F (36.2 °C) (Axillary)   Resp 24   Ht 5' 7\" (1.702 m)   Wt 250 lb (113.4 kg)   SpO2 92%   BMI 39.16 kg/m²    Due to the current efforts to prevent transmission of COVID-19 and also the need to preserve PPE for other caregivers, a face-to-face encounter with the patient was not performed. That being said, all relevant records and diagnostic tests were reviewed, including laboratory results and imaging. Please reference any relevant documentation elsewhere. Care will be coordinated with the primary service. Data:    Oximetry:  SpO2 Readings from Last 1 Encounters:   12/08/20 92%       Imaging personally reviewed by myself:  CXR     Impression    Diffuse bilateral pneumonia.       Echocardiogram:  2015      Summary   Moderate concentric left ventricular hypertrophy was present.   Left ventricular regional wall motion and systolic function are normal.   There is doppler evidence for stage Ia diastolic dysfunction.   The left atrium is mildly dilated.   Mild mitral regurgitation is present.  Mandeep Angela is moderate aortic stenosis with a valve area of 1.2 cm2; a maximum   gradient of 28 mmHg and a mean gradient of 17 mmHg.     Labs:  11/30/2020  covid +  proBNP 4564  procalcitonin 6.01  CRP 15.4    D dimer 827  IL-6 pending     Pertinent labs reviewed and noted:  Lab Results   Component Value Date    WBC 13.7 12/08/2020    HGB 12.4 12/08/2020    HCT 39.6 12/08/2020    MCV 82.8 12/08/2020    MCH 25.9 12/08/2020    MCHC 31.3 12/08/2020    RDW 15.9 12/08/2020     12/08/2020    MPV 10.9 12/08/2020     Lab Results Component Value Date     12/08/2020    K 4.9 12/08/2020     12/08/2020    CO2 22 12/08/2020    BUN 67 12/08/2020    CREATININE 1.3 12/08/2020    LABALBU 3.1 12/08/2020    LABALBU 3.8 12/21/2011    CALCIUM 9.0 12/08/2020    GFRAA >60 12/08/2020    LABGLOM 54 12/08/2020     Lab Results   Component Value Date    PROTIME 16.5 12/06/2020    PROTIME 12.2 12/18/2011    INR 1.4 12/06/2020       Assessment:  1. Acute on chronic hypoxic respiratory failure  2. covid-19 pneumonia  3. Copd with acute exacerbation  4. JOSH  5. Possible superimposed bacterial pneumonia  6. Leukocytosis/lymphopenia  7. PAD   8. DEMARCUS non compliant with PAP  9. Nicotine dependence     Plan:  1. Wean oxygen as tolerated for saturation 90% and greater  2. Nocturnal and prn bipap  3. Await results of urine antigens  4. IL-6 level pending   5. Decadron 6mg BID  6. Remdesivir  7. Empiric antibiotics for now  8. DVT prophylaxis  9. Scheduled bronchodilators  10. If he re-fevers or has evidence of rapid decline/cytokine storm will proceed with Actemra, however, I do not feel there is a role for it at present.      Electronically signed by Evan Smith MD on 12/8/2020 at 3:34 PM

## 2020-12-08 NOTE — PROGRESS NOTES
Progress Note  Date:2020       HUXS:[id number]  Patient Name:Jonathan Hernandez     YOB: 1950     Age:70 y.o. Patient seen for follow up of Acute on chronic hypoxic respiratory failure. Patient this am on bipap. He opens his eyes but does not answer questions. No acute issues overnight per nursing staff. Subjective    Subjective:  Symptoms:  (Unable to obtain. ). Review of Systems  Objective         Vitals Last 24 Hours:  TEMPERATURE:  Temp  Av.4 °F (36.9 °C)  Min: 97.8 °F (36.6 °C)  Max: 98.9 °F (37.2 °C)  RESPIRATIONS RANGE: Resp  Av.3  Min: 20  Max: 32  PULSE OXIMETRY RANGE: SpO2  Av.3 %  Min: 92 %  Max: 96 %  PULSE RANGE: Pulse  Av.3  Min: 68  Max: 75  BLOOD PRESSURE RANGE: Systolic (06UQR), ICE:779 , Min:102 , CCD:303   ; Diastolic (50RHR), HQJ:19, Min:58, Max:60    I/O (24Hr): Intake/Output Summary (Last 24 hours) at 2020 0459  Last data filed at 2020 2100  Gross per 24 hour   Intake 120 ml   Output 575 ml   Net -455 ml     Objective:  General Appearance:  Comfortable, ill-appearing and in no acute distress. Vital signs: (most recent): Blood pressure (!) 102/58, pulse 68, temperature 98.5 °F (36.9 °C), temperature source Axillary, resp. rate 22, height 5' 7\" (1.702 m), weight 250 lb (113.4 kg), SpO2 92 %. Lungs:  Normal effort and tachypnea. There are rales and decreased breath sounds. No rhonchi. Heart: Normal rate. Regular rhythm. No murmur. Abdomen: Abdomen is soft and non-distended. Bowel sounds are normal.   There is no abdominal tenderness. There is no rebound tenderness. There is no guarding. Extremities: Normal range of motion. There is no dependent edema. Neurological: (Arouses to voice and touch. ). Skin:  Warm and dry.       Labs/Imaging/Diagnostics    Labs:  CBC:  Recent Labs     20  2311 20  0655   WBC 15.2* 14.9*   RBC 4.77 4.44   HGB 12.4* 11.6*   HCT 38.8 36.9*   MCV 81.3 83.1   RDW 15.8* 15.9*  270     CHEMISTRIES:  Recent Labs     12/06/20  2311 12/07/20  0655    139   K 4.5 4.9    106   CO2 22 19*   BUN 50* 58*   CREATININE 1.5* 1.5*   GLUCOSE 101* 213*   MG 1.7  --      PT/INR:  Recent Labs     12/06/20  2311   PROTIME 16.5*   INR 1.4     APTT:  Recent Labs     12/06/20  2311   APTT 32.7     LIVER PROFILE:  Recent Labs     12/06/20  2311 12/07/20  0655   AST 44* 40*   ALT 41* 41*   BILITOT 1.6* 1.4*   ALKPHOS 129 119       Imaging Last 24 Hours:  Xr Chest Portable    Result Date: 12/6/2020  EXAMINATION: ONE XRAY VIEW OF THE CHEST 12/6/2020 10:39 pm COMPARISON: March 28, 2015 HISTORY: ORDERING SYSTEM PROVIDED HISTORY: sob TECHNOLOGIST PROVIDED HISTORY: Reason for exam:->sob FINDINGS: Diffuse bilateral infiltrates are noted. The heart is mildly enlarged. No pneumothorax. The costophrenic angles are clear. Diffuse bilateral pneumonia. Assessment//Plan           Hospital Problems           Last Modified POA    Acute respiratory failure with hypoxia ([facility] [address] 12/7/2020 Yes        Assessment & Plan  1. Acute on Chronic Hypoxic Respiratory Failure  2. COVID-19 Pneumonia  3. JOSH   4. Elevated LFTs  5. Diabetes Mellitus Type II  6. Acute Exacerbation of COPD  7. CAD  8. HTN  9. Fungal dermatitis    Appreciated Pulmonary input. ? Toclizumab. IL-6 ordered on admission. Continue on steroids, Remdesivir, Doxycycline, and inhalers. Continue on Bipap. Reviewed code status with Dr. Kelly Wright with orders from Rolan Cosme. Will ask palliative care to see for goals of care.      Janine Hough DO    Electronically signed by Janine Hough DO on 12/8/20 at 4:59 AM EST

## 2020-12-09 LAB
ALBUMIN SERPL-MCNC: 2.5 G/DL (ref 3.5–5.2)
ALP BLD-CCNC: 106 U/L (ref 40–129)
ALT SERPL-CCNC: 38 U/L (ref 0–40)
ANION GAP SERPL CALCULATED.3IONS-SCNC: 13 MMOL/L (ref 7–16)
APTT: 31.1 SEC (ref 24.5–35.1)
AST SERPL-CCNC: 32 U/L (ref 0–39)
BASOPHILS ABSOLUTE: 0.01 E9/L (ref 0–0.2)
BASOPHILS RELATIVE PERCENT: 0.1 % (ref 0–2)
BILIRUB SERPL-MCNC: 0.7 MG/DL (ref 0–1.2)
BUN BLDV-MCNC: 83 MG/DL (ref 8–23)
CALCIUM SERPL-MCNC: 8.4 MG/DL (ref 8.6–10.2)
CHLORIDE BLD-SCNC: 104 MMOL/L (ref 98–107)
CO2: 19 MMOL/L (ref 22–29)
CREAT SERPL-MCNC: 1.4 MG/DL (ref 0.7–1.2)
D DIMER: 598 NG/ML DDU
EOSINOPHILS ABSOLUTE: 0 E9/L (ref 0.05–0.5)
EOSINOPHILS RELATIVE PERCENT: 0 % (ref 0–6)
FERRITIN: 476 NG/ML
FIBRINOGEN: >700 MG/DL (ref 225–540)
GFR AFRICAN AMERICAN: >60
GFR NON-AFRICAN AMERICAN: 50 ML/MIN/1.73
GLUCOSE BLD-MCNC: 304 MG/DL (ref 74–99)
HCT VFR BLD CALC: 38 % (ref 37–54)
HEMOGLOBIN: 11.7 G/DL (ref 12.5–16.5)
IMMATURE GRANULOCYTES #: 0.1 E9/L
IMMATURE GRANULOCYTES %: 0.8 % (ref 0–5)
INR BLD: 1.2
LYMPHOCYTES ABSOLUTE: 0.75 E9/L (ref 1.5–4)
LYMPHOCYTES RELATIVE PERCENT: 6 % (ref 20–42)
MCH RBC QN AUTO: 25.6 PG (ref 26–35)
MCHC RBC AUTO-ENTMCNC: 30.8 % (ref 32–34.5)
MCV RBC AUTO: 83.2 FL (ref 80–99.9)
METER GLUCOSE: 209 MG/DL (ref 74–99)
METER GLUCOSE: 248 MG/DL (ref 74–99)
METER GLUCOSE: 269 MG/DL (ref 74–99)
METER GLUCOSE: 312 MG/DL (ref 74–99)
MONOCYTES ABSOLUTE: 0.32 E9/L (ref 0.1–0.95)
MONOCYTES RELATIVE PERCENT: 2.5 % (ref 2–12)
NEUTROPHILS ABSOLUTE: 11.42 E9/L (ref 1.8–7.3)
NEUTROPHILS RELATIVE PERCENT: 90.6 % (ref 43–80)
PDW BLD-RTO: 15.9 FL (ref 11.5–15)
PLATELET # BLD: 312 E9/L (ref 130–450)
PMV BLD AUTO: 11.1 FL (ref 7–12)
POTASSIUM REFLEX MAGNESIUM: 4.6 MMOL/L (ref 3.5–5)
PROTHROMBIN TIME: 13.6 SEC (ref 9.3–12.4)
RBC # BLD: 4.57 E12/L (ref 3.8–5.8)
SODIUM BLD-SCNC: 136 MMOL/L (ref 132–146)
STREP PNEUMONIAE ANTIGEN, URINE: NORMAL
TOTAL PROTEIN: 6.3 G/DL (ref 6.4–8.3)
URINE CULTURE, ROUTINE: NORMAL
WBC # BLD: 12.6 E9/L (ref 4.5–11.5)

## 2020-12-09 PROCEDURE — 85378 FIBRIN DEGRADE SEMIQUANT: CPT

## 2020-12-09 PROCEDURE — 6370000000 HC RX 637 (ALT 250 FOR IP): Performed by: INTERNAL MEDICINE

## 2020-12-09 PROCEDURE — 2060000000 HC ICU INTERMEDIATE R&B

## 2020-12-09 PROCEDURE — 2500000003 HC RX 250 WO HCPCS: Performed by: INTERNAL MEDICINE

## 2020-12-09 PROCEDURE — 6360000002 HC RX W HCPCS: Performed by: INTERNAL MEDICINE

## 2020-12-09 PROCEDURE — 2700000000 HC OXYGEN THERAPY PER DAY

## 2020-12-09 PROCEDURE — 85610 PROTHROMBIN TIME: CPT

## 2020-12-09 PROCEDURE — 36415 COLL VENOUS BLD VENIPUNCTURE: CPT

## 2020-12-09 PROCEDURE — 82962 GLUCOSE BLOOD TEST: CPT

## 2020-12-09 PROCEDURE — 2580000003 HC RX 258: Performed by: INTERNAL MEDICINE

## 2020-12-09 PROCEDURE — 80053 COMPREHEN METABOLIC PANEL: CPT

## 2020-12-09 PROCEDURE — 6360000002 HC RX W HCPCS: Performed by: STUDENT IN AN ORGANIZED HEALTH CARE EDUCATION/TRAINING PROGRAM

## 2020-12-09 PROCEDURE — 85384 FIBRINOGEN ACTIVITY: CPT

## 2020-12-09 PROCEDURE — 85025 COMPLETE CBC W/AUTO DIFF WBC: CPT

## 2020-12-09 PROCEDURE — 85730 THROMBOPLASTIN TIME PARTIAL: CPT

## 2020-12-09 PROCEDURE — 86140 C-REACTIVE PROTEIN: CPT

## 2020-12-09 PROCEDURE — 94761 N-INVAS EAR/PLS OXIMETRY MLT: CPT

## 2020-12-09 PROCEDURE — 94660 CPAP INITIATION&MGMT: CPT

## 2020-12-09 PROCEDURE — 99233 SBSQ HOSP IP/OBS HIGH 50: CPT | Performed by: STUDENT IN AN ORGANIZED HEALTH CARE EDUCATION/TRAINING PROGRAM

## 2020-12-09 RX ORDER — DEXAMETHASONE SODIUM PHOSPHATE 4 MG/ML
6 INJECTION, SOLUTION INTRA-ARTICULAR; INTRALESIONAL; INTRAMUSCULAR; INTRAVENOUS; SOFT TISSUE EVERY 12 HOURS
Status: DISCONTINUED | OUTPATIENT
Start: 2020-12-09 | End: 2020-12-16 | Stop reason: HOSPADM

## 2020-12-09 RX ORDER — INSULIN GLARGINE 100 [IU]/ML
30 INJECTION, SOLUTION SUBCUTANEOUS NIGHTLY
Status: DISCONTINUED | OUTPATIENT
Start: 2020-12-09 | End: 2020-12-16 | Stop reason: HOSPADM

## 2020-12-09 RX ADMIN — DEXAMETHASONE SODIUM PHOSPHATE 6 MG: 4 INJECTION, SOLUTION INTRA-ARTICULAR; INTRALESIONAL; INTRAMUSCULAR; INTRAVENOUS; SOFT TISSUE at 10:57

## 2020-12-09 RX ADMIN — CARVEDILOL 6.25 MG: 6.25 TABLET, FILM COATED ORAL at 08:35

## 2020-12-09 RX ADMIN — BUDESONIDE AND FORMOTEROL FUMARATE DIHYDRATE 2 PUFF: 160; 4.5 AEROSOL RESPIRATORY (INHALATION) at 20:53

## 2020-12-09 RX ADMIN — INSULIN LISPRO 1 UNITS: 100 INJECTION, SOLUTION INTRAVENOUS; SUBCUTANEOUS at 21:59

## 2020-12-09 RX ADMIN — REMDESIVIR 100 MG: 100 INJECTION, POWDER, LYOPHILIZED, FOR SOLUTION INTRAVENOUS at 10:57

## 2020-12-09 RX ADMIN — ENOXAPARIN SODIUM 40 MG: 40 INJECTION SUBCUTANEOUS at 20:52

## 2020-12-09 RX ADMIN — Medication 1000 MG: at 20:52

## 2020-12-09 RX ADMIN — SERTRALINE 75 MG: 50 TABLET, FILM COATED ORAL at 08:43

## 2020-12-09 RX ADMIN — BUDESONIDE AND FORMOTEROL FUMARATE DIHYDRATE 2 PUFF: 160; 4.5 AEROSOL RESPIRATORY (INHALATION) at 08:41

## 2020-12-09 RX ADMIN — ASPIRIN 325 MG: 325 TABLET, COATED ORAL at 08:35

## 2020-12-09 RX ADMIN — CLOPIDOGREL BISULFATE 75 MG: 75 TABLET ORAL at 08:38

## 2020-12-09 RX ADMIN — DOXYCYCLINE HYCLATE 100 MG: 100 CAPSULE ORAL at 08:35

## 2020-12-09 RX ADMIN — MICONAZOLE NITRATE: 20 POWDER TOPICAL at 08:39

## 2020-12-09 RX ADMIN — MICONAZOLE NITRATE: 20 POWDER TOPICAL at 20:52

## 2020-12-09 RX ADMIN — ALBUTEROL SULFATE 2 PUFF: 90 AEROSOL, METERED RESPIRATORY (INHALATION) at 10:57

## 2020-12-09 RX ADMIN — SENNOSIDES 8.6 MG: 8.6 TABLET, FILM COATED ORAL at 17:15

## 2020-12-09 RX ADMIN — DOXYCYCLINE HYCLATE 100 MG: 100 CAPSULE ORAL at 20:52

## 2020-12-09 RX ADMIN — ALBUTEROL SULFATE 2 PUFF: 90 AEROSOL, METERED RESPIRATORY (INHALATION) at 17:15

## 2020-12-09 RX ADMIN — ISOSORBIDE MONONITRATE 60 MG: 60 TABLET, EXTENDED RELEASE ORAL at 08:35

## 2020-12-09 RX ADMIN — ALBUTEROL SULFATE 2 PUFF: 90 AEROSOL, METERED RESPIRATORY (INHALATION) at 20:53

## 2020-12-09 RX ADMIN — DEXAMETHASONE SODIUM PHOSPHATE 6 MG: 4 INJECTION, SOLUTION INTRA-ARTICULAR; INTRALESIONAL; INTRAMUSCULAR; INTRAVENOUS; SOFT TISSUE at 04:04

## 2020-12-09 RX ADMIN — ENOXAPARIN SODIUM 40 MG: 40 INJECTION SUBCUTANEOUS at 08:39

## 2020-12-09 RX ADMIN — INSULIN GLARGINE 30 UNITS: 100 INJECTION, SOLUTION SUBCUTANEOUS at 21:00

## 2020-12-09 RX ADMIN — INSULIN LISPRO 3 UNITS: 100 INJECTION, SOLUTION INTRAVENOUS; SUBCUTANEOUS at 17:17

## 2020-12-09 RX ADMIN — CARVEDILOL 6.25 MG: 6.25 TABLET, FILM COATED ORAL at 17:15

## 2020-12-09 RX ADMIN — ATORVASTATIN CALCIUM 20 MG: 20 TABLET, FILM COATED ORAL at 20:52

## 2020-12-09 RX ADMIN — PANTOPRAZOLE SODIUM 40 MG: 40 TABLET, DELAYED RELEASE ORAL at 07:02

## 2020-12-09 RX ADMIN — INSULIN LISPRO 4 UNITS: 100 INJECTION, SOLUTION INTRAVENOUS; SUBCUTANEOUS at 12:57

## 2020-12-09 RX ADMIN — Medication 10 ML: at 20:53

## 2020-12-09 RX ADMIN — LEVOTHYROXINE SODIUM 150 MCG: 75 TABLET ORAL at 07:02

## 2020-12-09 RX ADMIN — ZINC SULFATE 220 MG (50 MG) CAPSULE 50 MG: CAPSULE at 08:38

## 2020-12-09 RX ADMIN — Medication 10 ML: at 08:39

## 2020-12-09 RX ADMIN — Medication 1000 MG: at 08:38

## 2020-12-09 RX ADMIN — WATER 1 G: 1 INJECTION INTRAMUSCULAR; INTRAVENOUS; SUBCUTANEOUS at 17:15

## 2020-12-09 RX ADMIN — MONTELUKAST SODIUM 10 MG: 10 TABLET, FILM COATED ORAL at 20:52

## 2020-12-09 RX ADMIN — INSULIN LISPRO 2 UNITS: 100 INJECTION, SOLUTION INTRAVENOUS; SUBCUTANEOUS at 08:44

## 2020-12-09 ASSESSMENT — PAIN SCALES - GENERAL
PAINLEVEL_OUTOF10: 0
PAINLEVEL_OUTOF10: 0

## 2020-12-09 NOTE — PROGRESS NOTES
Palliative Care Department  [phone]  Palliative Care Initial Consult  Provider Kermit Arreguin  [id number]  Hospital Day: 4  Date of Initial Consult: 12/8/2020  Referring Provider: Dave Cristina DO   Palliative Medicine was consulted for assistance with: Goals of Care, Code Status Discussion     HPI:   Katarina Arroyo is a 65 y.o. with a medical history of COPD on 3 L nasal cannula, CAD, DM who was admitted on 12/6/2020 from nursing facility with a CHIEF COMPLAINT of tachypnea and respiratory distress. Patient found to be positive for COVID-19. CXR with bilateral infiltrates. Patient admitted to telemetry for further work-up and management. Palliative medicine consulted for goals of care and CODE STATUS discussion. ASSESSMENT/PLAN:     Pertinent Hospital Diagnoses      Acute on chronic hypoxic respiratory failure: hx COPD, chronically on 3 L O2, pulmonology following, wean O2 as able, BiPAP as needed   COVID-19 pneumonia: Decadron, remdesivir, wean oxygen as able   JOSH: BUN 67, creatinine 1.3 today   Hx DM   Hx COPD   Hx CAD      Palliative Care Encounter / Counseling Regarding Goals of Care  Please see detailed goals of care discussion as below   At this time, Katarina Arroyo, Does have capacity for medical decision-making. Capacity is time limited and situation/question specific   During encounter, Dung Hawk, was surrogate medical decision-maker   Outcome of goals of care meeting:    Code status: Limited:  DNR-CCA, DNI   Advanced Directives: POA in epic   Surrogate/Legal NOK:  Tamar Glaser, sister, [phone], [phone]      Spiritual assessment: no spiritual distress identified  Bereavement and grief: to be determined  Referrals to: none today  SUBJECTIVE:       Details of Conversation:   12/9/20  Spoke to Katarina Arroyo, he does have capacity to make medical decisions. He told me that he would not want to be resuscitated or intubated.   I spoke to her Sister Britni Yoon as well, they have decided for Saad Wellington that they would not want him to be intubated as well. Changed him to a limited code DNR CCA DNI. 12/8/20  Chart reviewed. Face-to-face encounter not performed due to COVID-19 isolation. Patient currently on 15 L high flow nasal cannula. Attempted to discuss code status and goals of care however patient unable to carry on meaningful medical conversation. Patient states he will talk to Ai Camargo, sister/POA, regarding code status and goals of care. Patient does state he would like to return to [facility] at discharge. Patient's power of  and sister, Ai Camargo, called. Explained role of palliative medicine. Ai Camargo explains that she is aware of patient's multiple comorbidities and does not know if patient would want to be intubated. Ai Camargo plans to speak to her family this evening and asks that palliative medicine call her tomorrow morning to further discuss CODE STATUS. Ai Minkathy states goal is for patient to get better and return to nursing facility. Support provided and questions answered. Will continue to follow.         OBJECTIVE:   Prognosis: unknown    Physical Exam:  BP (!) 110/55   Pulse 59   Temp 98.5 °F (36.9 °C) (Axillary)   Resp 24   Ht 5' 7\" (1.702 m)   Wt 250 lb (113.4 kg)   SpO2 92%   BMI 39.16 kg/m²      As per bedside RN assessment and extensive chart review:  Constitutional:  Alert, high flow nasal cannula  Eyes: No scleral icterus, normal lids, no discharge  ENMT:  Normocephalic, atraumatic, mucosa moist, EOMI  Neck:  Trachea midline, no JVD  Lungs:  High flow nasal cannula, diminished bilaterally  Heart[de-identified]  RRR  Abd:  Soft, rounded, bowel sounds present  Ext:  Moving all extremities, pulses present  Skin:  Warm and dry  Neuro:  Alert    Objective data reviewed: labs, images, records, medication use, vitals and chart    Discussed patient and the plan of care with the other IDT members: Palliative Medicine IDT Team    Time/Communication  Greater than 50% of time spent, total 50 minutes in counseling and coordination of care at the bedside regarding goals of care, diagnosis and prognosis and see above. Thank you for allowing Palliative Medicine to participate in the care of Ramon Hawk. Note: This report was completed using computerNPC III voiced recognition software. Every effort has been made to ensure accuracy; however, inadvertent computerized transcription errors may be present.

## 2020-12-09 NOTE — PROGRESS NOTES
Pulmonary Subsequent Hospital F/U note    Patient is being followed for: covid-19 pneumonia, acute hypoxic respiratory failure     Interval HPI: Patient seen through window phone interview conducted, case discussed with staff and effort to conserve PPE and limit exposure to COVID-19. Appears comfortable on 15L HF  No new issues reported  Wore BiPAP 50% 16/6 last night     ROS:  Denies fever, night sweats  +cough and dyspnea, no wheeze  Denies chest pain, edema, palpitations  Denies nausea    Exam:  BP (!) 110/55   Pulse 59   Temp 98.5 °F (36.9 °C) (Axillary)   Resp 18   Ht 5' 7\" (1.702 m)   Wt 250 lb (113.4 kg)   SpO2 92%   BMI 39.16 kg/m²    Due to the current efforts to prevent transmission of COVID-19 and also the need to preserve PPE for other caregivers, a face-to-face encounter with the patient was not performed. That being said, all relevant records and diagnostic tests were reviewed, including laboratory results and imaging. Please reference any relevant documentation elsewhere. Care will be coordinated with the primary service. Data:    Oximetry:  SpO2 Readings from Last 1 Encounters:   12/09/20 92%       Imaging personally reviewed by myself:  CXR     Impression    Diffuse bilateral pneumonia.       Echocardiogram:  2015      Summary   Moderate concentric left ventricular hypertrophy was present.   Left ventricular regional wall motion and systolic function are normal.   There is doppler evidence for stage Ia diastolic dysfunction.   The left atrium is mildly dilated.   Mild mitral regurgitation is present.  Lizzy Mimes is moderate aortic stenosis with a valve area of 1.2 cm2; a maximum   gradient of 28 mmHg and a mean gradient of 17 mmHg.     Labs:  11/30/2020  covid +  proBNP 4564  procalcitonin 6.01  CRP 15.4    Fibrinogen> 700  D dimer 827>598  IL-6 pending     Pertinent labs reviewed and noted:  Lab Results   Component Value Date    WBC 12.6 12/09/2020    HGB 11.7 12/09/2020    HCT 38.0 12/09/2020    MCV 83.2 12/09/2020    MCH 25.6 12/09/2020    MCHC 30.8 12/09/2020    RDW 15.9 12/09/2020     12/09/2020    MPV 11.1 12/09/2020     Lab Results   Component Value Date     12/09/2020    K 4.6 12/09/2020     12/09/2020    CO2 19 12/09/2020    BUN 83 12/09/2020    CREATININE 1.4 12/09/2020    LABALBU 2.5 12/09/2020    LABALBU 3.8 12/21/2011    CALCIUM 8.4 12/09/2020    GFRAA >60 12/09/2020    LABGLOM 50 12/09/2020     Lab Results   Component Value Date    PROTIME 13.6 12/09/2020    PROTIME 12.2 12/18/2011    INR 1.2 12/09/2020       Assessment:  1. Acute on chronic hypoxic respiratory failure  2. covid-19 pneumonia  3. Copd with acute exacerbation  4. JOSH  5. Possible superimposed bacterial pneumonia  6. Leukocytosis/lymphopenia  7. PAD   8. DEMARCUS non compliant with PAP  9. Nicotine dependence     Plan:  1. Wean oxygen 15 L high flow cannula as tolerated for saturation 90% and greater  2. Nocturnal and prn bipap 16/8  3. Bacterial urine antigens negative  4. IL-6 level pending   5. Decadron 6mg BID  6. Remdesivir  7. Empiric antibiotics with Rocephin and doxycycline for now for possible superimposed bacterial pneumonia  8. DVT prophylaxis  9. Scheduled bronchodilators-Symbicort twice daily, Combivent as needed. Add incentive spirometer  10. If he re-fevers or has evidence of rapid decline/cytokine storm will proceed with Actemra, hold off for now. 11. PT OT  This plan of care is reviewed in collaboration with Dr. Tracy Harvey  Electronically signed by LEONA Adams CNP on 12/9/2020 at 3:01 PM    I personally saw, examined, and cared for the patient. Labs, medications, radiographs reviewed.  I agree with history exam and plans detailed in NP note with the following additions:    Wean oxygen as able  meds as above  Recheck CRP/D dimer today    Electronically signed by Evan Smith MD on 12/9/2020 at 3:10 PM

## 2020-12-09 NOTE — PROGRESS NOTES
Labs     12/06/20 2311 12/07/20  0655 12/08/20  0550    139 139   K 4.5 4.9 4.9    106 107   CO2 22 19* 22   BUN 50* 58* 67*   CREATININE 1.5* 1.5* 1.3*   GLUCOSE 101* 213* 211*   MG 1.7  --   --      PT/INR:  Recent Labs     12/06/20 2311   PROTIME 16.5*   INR 1.4     APTT:  Recent Labs     12/06/20 2311   APTT 32.7     LIVER PROFILE:  Recent Labs     12/06/20 2311 12/07/20  0655 12/08/20  0550   AST 44* 40* 33   ALT 41* 41* 41*   BILITOT 1.6* 1.4* 1.1   ALKPHOS 129 119 128       Imaging Last 24 Hours:  No results found. Assessment//Plan           Hospital Problems           Last Modified POA    Acute respiratory failure with hypoxia ([facility] [address] 12/7/2020 Yes        Assessment & Plan  1. Acute on Chronic Hypoxic Respiratory Failure  2. COVID-19 Pneumonia  3. JOSH   4. Elevated LFTs  5. Diabetes Mellitus Type II  6. Acute Exacerbation of COPD  7. CAD  8. HTN  9. Fungal dermatitis    Await am labs. Appreciate pulmonary and Palliative care. Continue remdesivir and dexamethasone. Wean oxygen as tolerated.      Janine Hough DO    Electronically signed by Janine Hough DO on 12/9/20 at 4:47 AM EST

## 2020-12-10 LAB
ALBUMIN SERPL-MCNC: 2.8 G/DL (ref 3.5–5.2)
ALP BLD-CCNC: 110 U/L (ref 40–129)
ALT SERPL-CCNC: 64 U/L (ref 0–40)
ANION GAP SERPL CALCULATED.3IONS-SCNC: 9 MMOL/L (ref 7–16)
APTT: 34.4 SEC (ref 24.5–35.1)
AST SERPL-CCNC: 46 U/L (ref 0–39)
ATYPICAL LYMPHOCYTE RELATIVE PERCENT: 2 % (ref 0–4)
BASOPHILS ABSOLUTE: 0 E9/L (ref 0–0.2)
BASOPHILS RELATIVE PERCENT: 0 % (ref 0–2)
BILIRUB SERPL-MCNC: 0.6 MG/DL (ref 0–1.2)
BUN BLDV-MCNC: 81 MG/DL (ref 8–23)
BURR CELLS: ABNORMAL
C-REACTIVE PROTEIN: 5.3 MG/DL (ref 0–0.4)
CALCIUM SERPL-MCNC: 8.5 MG/DL (ref 8.6–10.2)
CHLORIDE BLD-SCNC: 104 MMOL/L (ref 98–107)
CO2: 21 MMOL/L (ref 22–29)
CREAT SERPL-MCNC: 1.4 MG/DL (ref 0.7–1.2)
EOSINOPHILS ABSOLUTE: 0 E9/L (ref 0.05–0.5)
EOSINOPHILS RELATIVE PERCENT: 0 % (ref 0–6)
GFR AFRICAN AMERICAN: >60
GFR NON-AFRICAN AMERICAN: 50 ML/MIN/1.73
GLUCOSE BLD-MCNC: 268 MG/DL (ref 74–99)
HCT VFR BLD CALC: 39.1 % (ref 37–54)
HEMOGLOBIN: 12.1 G/DL (ref 12.5–16.5)
INR BLD: 1.2
LYMPHOCYTES ABSOLUTE: 0.67 E9/L (ref 1.5–4)
LYMPHOCYTES RELATIVE PERCENT: 4 % (ref 20–42)
MCH RBC QN AUTO: 25.7 PG (ref 26–35)
MCHC RBC AUTO-ENTMCNC: 30.9 % (ref 32–34.5)
MCV RBC AUTO: 83.2 FL (ref 80–99.9)
METER GLUCOSE: 185 MG/DL (ref 74–99)
METER GLUCOSE: 217 MG/DL (ref 74–99)
METER GLUCOSE: 296 MG/DL (ref 74–99)
METER GLUCOSE: 307 MG/DL (ref 74–99)
MONOCYTES ABSOLUTE: 0.34 E9/L (ref 0.1–0.95)
MONOCYTES RELATIVE PERCENT: 3 % (ref 2–12)
NEUTROPHILS ABSOLUTE: 10.19 E9/L (ref 1.8–7.3)
NEUTROPHILS RELATIVE PERCENT: 91 % (ref 43–80)
OVALOCYTES: ABNORMAL
PDW BLD-RTO: 15.9 FL (ref 11.5–15)
PLATELET # BLD: 320 E9/L (ref 130–450)
PMV BLD AUTO: 11 FL (ref 7–12)
POIKILOCYTES: ABNORMAL
POLYCHROMASIA: ABNORMAL
POTASSIUM REFLEX MAGNESIUM: 4.7 MMOL/L (ref 3.5–5)
PROTHROMBIN TIME: 14 SEC (ref 9.3–12.4)
RBC # BLD: 4.7 E12/L (ref 3.8–5.8)
SODIUM BLD-SCNC: 134 MMOL/L (ref 132–146)
TOTAL PROTEIN: 6.4 G/DL (ref 6.4–8.3)
WBC # BLD: 11.2 E9/L (ref 4.5–11.5)

## 2020-12-10 PROCEDURE — 85730 THROMBOPLASTIN TIME PARTIAL: CPT

## 2020-12-10 PROCEDURE — 2060000000 HC ICU INTERMEDIATE R&B

## 2020-12-10 PROCEDURE — 6360000002 HC RX W HCPCS: Performed by: NURSE PRACTITIONER

## 2020-12-10 PROCEDURE — 6370000000 HC RX 637 (ALT 250 FOR IP): Performed by: INTERNAL MEDICINE

## 2020-12-10 PROCEDURE — 36415 COLL VENOUS BLD VENIPUNCTURE: CPT

## 2020-12-10 PROCEDURE — 82962 GLUCOSE BLOOD TEST: CPT

## 2020-12-10 PROCEDURE — 85025 COMPLETE CBC W/AUTO DIFF WBC: CPT

## 2020-12-10 PROCEDURE — 94660 CPAP INITIATION&MGMT: CPT

## 2020-12-10 PROCEDURE — 80053 COMPREHEN METABOLIC PANEL: CPT

## 2020-12-10 PROCEDURE — 2700000000 HC OXYGEN THERAPY PER DAY

## 2020-12-10 PROCEDURE — 2500000003 HC RX 250 WO HCPCS: Performed by: INTERNAL MEDICINE

## 2020-12-10 PROCEDURE — 6360000002 HC RX W HCPCS: Performed by: INTERNAL MEDICINE

## 2020-12-10 PROCEDURE — 85610 PROTHROMBIN TIME: CPT

## 2020-12-10 PROCEDURE — 2580000003 HC RX 258: Performed by: INTERNAL MEDICINE

## 2020-12-10 RX ADMIN — ALBUTEROL SULFATE 2 PUFF: 90 AEROSOL, METERED RESPIRATORY (INHALATION) at 20:19

## 2020-12-10 RX ADMIN — DEXAMETHASONE SODIUM PHOSPHATE 6 MG: 4 INJECTION, SOLUTION INTRA-ARTICULAR; INTRALESIONAL; INTRAMUSCULAR; INTRAVENOUS; SOFT TISSUE at 00:05

## 2020-12-10 RX ADMIN — MICONAZOLE NITRATE: 20 POWDER TOPICAL at 20:18

## 2020-12-10 RX ADMIN — INSULIN GLARGINE 30 UNITS: 100 INJECTION, SOLUTION SUBCUTANEOUS at 20:18

## 2020-12-10 RX ADMIN — ENOXAPARIN SODIUM 40 MG: 40 INJECTION SUBCUTANEOUS at 20:17

## 2020-12-10 RX ADMIN — LEVOTHYROXINE SODIUM 150 MCG: 75 TABLET ORAL at 04:41

## 2020-12-10 RX ADMIN — SERTRALINE 75 MG: 50 TABLET, FILM COATED ORAL at 08:27

## 2020-12-10 RX ADMIN — Medication 10 ML: at 08:27

## 2020-12-10 RX ADMIN — ATORVASTATIN CALCIUM 20 MG: 20 TABLET, FILM COATED ORAL at 20:18

## 2020-12-10 RX ADMIN — Medication 10 ML: at 20:18

## 2020-12-10 RX ADMIN — INSULIN LISPRO 6 UNITS: 100 INJECTION, SOLUTION INTRAVENOUS; SUBCUTANEOUS at 16:31

## 2020-12-10 RX ADMIN — INSULIN LISPRO 2 UNITS: 100 INJECTION, SOLUTION INTRAVENOUS; SUBCUTANEOUS at 09:29

## 2020-12-10 RX ADMIN — DEXAMETHASONE SODIUM PHOSPHATE 6 MG: 4 INJECTION, SOLUTION INTRA-ARTICULAR; INTRALESIONAL; INTRAMUSCULAR; INTRAVENOUS; SOFT TISSUE at 08:35

## 2020-12-10 RX ADMIN — BUDESONIDE AND FORMOTEROL FUMARATE DIHYDRATE 2 PUFF: 160; 4.5 AEROSOL RESPIRATORY (INHALATION) at 08:30

## 2020-12-10 RX ADMIN — ISOSORBIDE MONONITRATE 60 MG: 60 TABLET, EXTENDED RELEASE ORAL at 08:28

## 2020-12-10 RX ADMIN — ALBUTEROL SULFATE 2 PUFF: 90 AEROSOL, METERED RESPIRATORY (INHALATION) at 16:10

## 2020-12-10 RX ADMIN — INSULIN LISPRO 2 UNITS: 100 INJECTION, SOLUTION INTRAVENOUS; SUBCUTANEOUS at 20:18

## 2020-12-10 RX ADMIN — ENOXAPARIN SODIUM 40 MG: 40 INJECTION SUBCUTANEOUS at 08:27

## 2020-12-10 RX ADMIN — ZINC SULFATE 220 MG (50 MG) CAPSULE 50 MG: CAPSULE at 08:29

## 2020-12-10 RX ADMIN — Medication 1000 MG: at 11:23

## 2020-12-10 RX ADMIN — MICONAZOLE NITRATE: 20 POWDER TOPICAL at 08:35

## 2020-12-10 RX ADMIN — DOXYCYCLINE HYCLATE 100 MG: 100 CAPSULE ORAL at 08:29

## 2020-12-10 RX ADMIN — PANTOPRAZOLE SODIUM 40 MG: 40 TABLET, DELAYED RELEASE ORAL at 04:42

## 2020-12-10 RX ADMIN — REMDESIVIR 100 MG: 100 INJECTION, POWDER, LYOPHILIZED, FOR SOLUTION INTRAVENOUS at 09:41

## 2020-12-10 RX ADMIN — INSULIN LISPRO 8 UNITS: 100 INJECTION, SOLUTION INTRAVENOUS; SUBCUTANEOUS at 11:20

## 2020-12-10 RX ADMIN — BUDESONIDE AND FORMOTEROL FUMARATE DIHYDRATE 2 PUFF: 160; 4.5 AEROSOL RESPIRATORY (INHALATION) at 20:18

## 2020-12-10 RX ADMIN — CLOPIDOGREL BISULFATE 75 MG: 75 TABLET ORAL at 08:28

## 2020-12-10 RX ADMIN — MONTELUKAST SODIUM 10 MG: 10 TABLET, FILM COATED ORAL at 20:18

## 2020-12-10 RX ADMIN — ASPIRIN 325 MG: 325 TABLET, COATED ORAL at 08:28

## 2020-12-10 RX ADMIN — DEXAMETHASONE SODIUM PHOSPHATE 6 MG: 4 INJECTION, SOLUTION INTRA-ARTICULAR; INTRALESIONAL; INTRAMUSCULAR; INTRAVENOUS; SOFT TISSUE at 23:27

## 2020-12-10 RX ADMIN — Medication 1000 MG: at 20:18

## 2020-12-10 RX ADMIN — ALBUTEROL SULFATE 2 PUFF: 90 AEROSOL, METERED RESPIRATORY (INHALATION) at 11:24

## 2020-12-10 RX ADMIN — CARVEDILOL 6.25 MG: 6.25 TABLET, FILM COATED ORAL at 08:28

## 2020-12-10 ASSESSMENT — PAIN SCALES - GENERAL
PAINLEVEL_OUTOF10: 0
PAINLEVEL_OUTOF10: 0

## 2020-12-10 NOTE — PROGRESS NOTES
Progress Note  Date:12/10/2020       LKPQ:7576/6437-F  Patient Name:Jonathan Hernandez     YOB: 1950     Age:70 y.o. Patient seen for acute hypoxic respiratory failure secondary to COVID-19. He is currently on bipap. He opens his eyes to voice and touch but does not answer questions this am. He continues on 15 liters when not on the bipap. No new issues per nursing staff. Subjective    Subjective:  Symptoms:  (Unable to obtain. ). Review of Systems  Objective         Vitals Last 24 Hours:  TEMPERATURE:  Temp  Av °F (36.7 °C)  Min: 97.5 °F (36.4 °C)  Max: 98.5 °F (36.9 °C)  RESPIRATIONS RANGE: Resp  Av.8  Min: 17  Max: 24  PULSE OXIMETRY RANGE: SpO2  Av %  Min: 89 %  Max: 98 %  PULSE RANGE: Pulse  Av.5  Min: 48  Max: 59  BLOOD PRESSURE RANGE: Systolic (90DSX), CUP:906 , Min:110 , KSQ:240   ; Diastolic (87HKY), LJS:61, Min:55, Max:61    I/O (24Hr): Intake/Output Summary (Last 24 hours) at 12/10/2020 0546  Last data filed at 2020 1859  Gross per 24 hour   Intake 970 ml   Output 500 ml   Net 470 ml     Objective:  General Appearance:  Comfortable, well-appearing and in no acute distress. Vital signs: (most recent): Blood pressure 129/61, pulse (!) 48, temperature 97.5 °F (36.4 °C), temperature source Axillary, resp. rate 17, height 5' 7\" (1.702 m), weight 250 lb (113.4 kg), SpO2 98 %. Lungs:  Normal effort and normal respiratory rate. There are decreased breath sounds. No rales or rhonchi. Heart: Bradycardia. Regular rhythm. S1 normal and S2 normal.  No friction rub. Abdomen: Abdomen is soft and non-distended. Bowel sounds are normal.   There is no abdominal tenderness. There is no rebound tenderness. There is no guarding. Extremities: Normal range of motion. There is no dependent edema. Neurological: Patient is alert. Skin:  Warm and dry.       Labs/Imaging/Diagnostics    Labs:  CBC:  Recent Labs     20  0655 20  0550

## 2020-12-10 NOTE — PROGRESS NOTES
Date: 12/9/2020    Time: 9:16 PM    Patient Placed On BIPAP/CPAP/ Non-Invasive Ventilation? Yes    If no must comment. Facial area red/color change? No           If YES are Blister/Lesion present? No   If yes must notify nursing staff  BIPAP/CPAP skin barrier?   Yes    Skin barrier type:mepilexlite       Comments:        Yannick Spann

## 2020-12-10 NOTE — PROGRESS NOTES
Pulmonary Subsequent Hospital F/U note    Patient is being followed for: covid-19 pneumonia, acute hypoxic respiratory failure     Interval HPI: Patient seen through window phone interview conducted, case discussed with staff and effort to conserve PPE and limit exposure to COVID-19. Appears comfortable on 12L HF  No new issues reported  Wore BiPAP 50% 16/6 last night     ROS:  Denies fever, night sweats  +cough and dyspnea, no wheeze  Denies chest pain, edema, palpitations  Denies nausea    Exam:  /63   Pulse (!) 45   Temp 96 °F (35.6 °C) (Axillary)   Resp 16   Ht 5' 7\" (1.702 m)   Wt 250 lb (113.4 kg)   SpO2 93%   BMI 39.16 kg/m²    Due to the current efforts to prevent transmission of COVID-19 and also the need to preserve PPE for other caregivers, a face-to-face encounter with the patient was not performed. That being said, all relevant records and diagnostic tests were reviewed, including laboratory results and imaging. Please reference any relevant documentation elsewhere. Care will be coordinated with the primary service. Data:    Oximetry:  SpO2 Readings from Last 1 Encounters:   12/10/20 93%       Imaging personally reviewed by myself:  CXR     Impression    Diffuse bilateral pneumonia.       Echocardiogram:  2015      Summary   Moderate concentric left ventricular hypertrophy was present.   Left ventricular regional wall motion and systolic function are normal.   There is doppler evidence for stage Ia diastolic dysfunction.   The left atrium is mildly dilated.   Mild mitral regurgitation is present.  Antoni Murillo is moderate aortic stenosis with a valve area of 1.2 cm2; a maximum   gradient of 28 mmHg and a mean gradient of 17 mmHg.     Labs:  11/30/2020  covid +  proBNP 4564  procalcitonin 6.01  CRP 15.4    Fibrinogen> 700  D dimer 827>598  IL-6 pending     Pertinent labs reviewed and noted:  Lab Results   Component Value Date    WBC 12.6 12/09/2020    HGB 11.7 12/09/2020    HCT 38.0 12/09/2020    MCV 83.2 12/09/2020    MCH 25.6 12/09/2020    MCHC 30.8 12/09/2020    RDW 15.9 12/09/2020     12/09/2020    MPV 11.1 12/09/2020     Lab Results   Component Value Date     12/09/2020    K 4.6 12/09/2020     12/09/2020    CO2 19 12/09/2020    BUN 83 12/09/2020    CREATININE 1.4 12/09/2020    LABALBU 2.5 12/09/2020    LABALBU 3.8 12/21/2011    CALCIUM 8.4 12/09/2020    GFRAA >60 12/09/2020    LABGLOM 50 12/09/2020     Lab Results   Component Value Date    PROTIME 13.6 12/09/2020    PROTIME 12.2 12/18/2011    INR 1.2 12/09/2020       Assessment:  1. Acute on chronic hypoxic respiratory failure  2. covid-19 pneumonia  3. Copd with acute exacerbation  4. JOSH  5. Possible superimposed bacterial pneumonia  6. Leukocytosis/lymphopenia  7. PAD   8. DEMARCUS non compliant with PAP  9. Nicotine dependence     Plan:  1. Wean oxygen 12 L high flow cannula as tolerated for saturation 90% and greater ,. Discussed with nursing  2. Nocturnal and prn bipap 16/8  3. Bacterial urine antigens negative  4. IL-6 level pending   5. Decadron 6mg BID  6. Remdesivir 5 total days  7. Stop abx  8. DVT prophylaxis  9. Scheduled bronchodilators-Symbicort twice daily, Combivent as needed. incentive spirometer  10. . PT eval and treat  This plan of care is reviewed in collaboration with Dr. Hetal Wilkes  Electronically signed by LEONA Bazan - CNP on 12/10/2020 at 3:19 PM     I personally saw, examined, and cared for the patient. Labs, medications, radiographs reviewed.  I agree with history exam and plans detailed in NP note with the following additions:    Making a lot of progress now on 7L oxygen  Continue Decadron and complete 5 days of remdesivir  DVT prophylaxis  Bronchodilators  Trend labs  Incentive/activity    Electronically signed by Fahad Brunson MD on 12/10/2020 at 9:31 PM

## 2020-12-11 LAB
ALBUMIN SERPL-MCNC: 2.7 G/DL (ref 3.5–5.2)
ALP BLD-CCNC: 109 U/L (ref 40–129)
ALT SERPL-CCNC: 71 U/L (ref 0–40)
ANION GAP SERPL CALCULATED.3IONS-SCNC: 8 MMOL/L (ref 7–16)
APTT: 34.3 SEC (ref 24.5–35.1)
AST SERPL-CCNC: 51 U/L (ref 0–39)
BASOPHILS ABSOLUTE: 0.01 E9/L (ref 0–0.2)
BASOPHILS RELATIVE PERCENT: 0.1 % (ref 0–2)
BILIRUB SERPL-MCNC: 0.6 MG/DL (ref 0–1.2)
BUN BLDV-MCNC: 71 MG/DL (ref 8–23)
CALCIUM SERPL-MCNC: 8.4 MG/DL (ref 8.6–10.2)
CHLORIDE BLD-SCNC: 106 MMOL/L (ref 98–107)
CO2: 22 MMOL/L (ref 22–29)
CREAT SERPL-MCNC: 1.3 MG/DL (ref 0.7–1.2)
EOSINOPHILS ABSOLUTE: 0 E9/L (ref 0.05–0.5)
EOSINOPHILS RELATIVE PERCENT: 0 % (ref 0–6)
GFR AFRICAN AMERICAN: >60
GFR NON-AFRICAN AMERICAN: 54 ML/MIN/1.73
GLUCOSE BLD-MCNC: 300 MG/DL (ref 74–99)
HCT VFR BLD CALC: 37.8 % (ref 37–54)
HEMOGLOBIN: 12 G/DL (ref 12.5–16.5)
IMMATURE GRANULOCYTES #: 0.13 E9/L
IMMATURE GRANULOCYTES %: 1.2 % (ref 0–5)
INR BLD: 1.2
LYMPHOCYTES ABSOLUTE: 0.72 E9/L (ref 1.5–4)
LYMPHOCYTES RELATIVE PERCENT: 6.7 % (ref 20–42)
Lab: NORMAL
MCH RBC QN AUTO: 26.1 PG (ref 26–35)
MCHC RBC AUTO-ENTMCNC: 31.7 % (ref 32–34.5)
MCV RBC AUTO: 82.4 FL (ref 80–99.9)
METER GLUCOSE: 186 MG/DL (ref 74–99)
METER GLUCOSE: 300 MG/DL (ref 74–99)
METER GLUCOSE: 315 MG/DL (ref 74–99)
METER GLUCOSE: 344 MG/DL (ref 74–99)
MONOCYTES ABSOLUTE: 0.53 E9/L (ref 0.1–0.95)
MONOCYTES RELATIVE PERCENT: 4.9 % (ref 2–12)
NEUTROPHILS ABSOLUTE: 9.34 E9/L (ref 1.8–7.3)
NEUTROPHILS RELATIVE PERCENT: 87.1 % (ref 43–80)
PDW BLD-RTO: 16.2 FL (ref 11.5–15)
PLATELET # BLD: 317 E9/L (ref 130–450)
PMV BLD AUTO: 11.6 FL (ref 7–12)
POTASSIUM REFLEX MAGNESIUM: 5.2 MMOL/L (ref 3.5–5)
PROTHROMBIN TIME: 13.3 SEC (ref 9.3–12.4)
RBC # BLD: 4.59 E12/L (ref 3.8–5.8)
REPORT: NORMAL
SODIUM BLD-SCNC: 136 MMOL/L (ref 132–146)
THIS TEST SENT TO: NORMAL
TOTAL PROTEIN: 6.2 G/DL (ref 6.4–8.3)
WBC # BLD: 10.7 E9/L (ref 4.5–11.5)

## 2020-12-11 PROCEDURE — 36415 COLL VENOUS BLD VENIPUNCTURE: CPT

## 2020-12-11 PROCEDURE — 6360000002 HC RX W HCPCS: Performed by: NURSE PRACTITIONER

## 2020-12-11 PROCEDURE — 97530 THERAPEUTIC ACTIVITIES: CPT

## 2020-12-11 PROCEDURE — 82962 GLUCOSE BLOOD TEST: CPT

## 2020-12-11 PROCEDURE — 85025 COMPLETE CBC W/AUTO DIFF WBC: CPT

## 2020-12-11 PROCEDURE — 97162 PT EVAL MOD COMPLEX 30 MIN: CPT

## 2020-12-11 PROCEDURE — 6370000000 HC RX 637 (ALT 250 FOR IP): Performed by: INTERNAL MEDICINE

## 2020-12-11 PROCEDURE — 2580000003 HC RX 258: Performed by: INTERNAL MEDICINE

## 2020-12-11 PROCEDURE — 80053 COMPREHEN METABOLIC PANEL: CPT

## 2020-12-11 PROCEDURE — 2060000000 HC ICU INTERMEDIATE R&B

## 2020-12-11 PROCEDURE — 94660 CPAP INITIATION&MGMT: CPT

## 2020-12-11 PROCEDURE — 2500000003 HC RX 250 WO HCPCS: Performed by: INTERNAL MEDICINE

## 2020-12-11 PROCEDURE — 2700000000 HC OXYGEN THERAPY PER DAY

## 2020-12-11 PROCEDURE — 6360000002 HC RX W HCPCS: Performed by: INTERNAL MEDICINE

## 2020-12-11 PROCEDURE — 85730 THROMBOPLASTIN TIME PARTIAL: CPT

## 2020-12-11 PROCEDURE — 85610 PROTHROMBIN TIME: CPT

## 2020-12-11 RX ADMIN — BUDESONIDE AND FORMOTEROL FUMARATE DIHYDRATE 2 PUFF: 160; 4.5 AEROSOL RESPIRATORY (INHALATION) at 22:20

## 2020-12-11 RX ADMIN — BUDESONIDE AND FORMOTEROL FUMARATE DIHYDRATE 2 PUFF: 160; 4.5 AEROSOL RESPIRATORY (INHALATION) at 09:33

## 2020-12-11 RX ADMIN — INSULIN LISPRO 3 UNITS: 100 INJECTION, SOLUTION INTRAVENOUS; SUBCUTANEOUS at 12:16

## 2020-12-11 RX ADMIN — CARVEDILOL 6.25 MG: 6.25 TABLET, FILM COATED ORAL at 09:29

## 2020-12-11 RX ADMIN — SERTRALINE 75 MG: 50 TABLET, FILM COATED ORAL at 09:29

## 2020-12-11 RX ADMIN — Medication 10 ML: at 09:00

## 2020-12-11 RX ADMIN — ISOSORBIDE MONONITRATE 60 MG: 60 TABLET, EXTENDED RELEASE ORAL at 09:29

## 2020-12-11 RX ADMIN — ASPIRIN 325 MG: 325 TABLET, COATED ORAL at 09:29

## 2020-12-11 RX ADMIN — ALBUTEROL SULFATE 2 PUFF: 90 AEROSOL, METERED RESPIRATORY (INHALATION) at 12:02

## 2020-12-11 RX ADMIN — PANTOPRAZOLE SODIUM 40 MG: 40 TABLET, DELAYED RELEASE ORAL at 04:57

## 2020-12-11 RX ADMIN — CLOPIDOGREL BISULFATE 75 MG: 75 TABLET ORAL at 09:29

## 2020-12-11 RX ADMIN — INSULIN LISPRO 2 UNITS: 100 INJECTION, SOLUTION INTRAVENOUS; SUBCUTANEOUS at 09:48

## 2020-12-11 RX ADMIN — MONTELUKAST SODIUM 10 MG: 10 TABLET, FILM COATED ORAL at 22:23

## 2020-12-11 RX ADMIN — LEVOTHYROXINE SODIUM 150 MCG: 75 TABLET ORAL at 04:57

## 2020-12-11 RX ADMIN — INSULIN LISPRO 8 UNITS: 100 INJECTION, SOLUTION INTRAVENOUS; SUBCUTANEOUS at 17:20

## 2020-12-11 RX ADMIN — MICONAZOLE NITRATE: 20 POWDER TOPICAL at 22:24

## 2020-12-11 RX ADMIN — ALBUTEROL SULFATE 2 PUFF: 90 AEROSOL, METERED RESPIRATORY (INHALATION) at 09:32

## 2020-12-11 RX ADMIN — INSULIN LISPRO 4 UNITS: 100 INJECTION, SOLUTION INTRAVENOUS; SUBCUTANEOUS at 22:15

## 2020-12-11 RX ADMIN — Medication 1000 MG: at 09:29

## 2020-12-11 RX ADMIN — DEXAMETHASONE SODIUM PHOSPHATE 6 MG: 4 INJECTION, SOLUTION INTRA-ARTICULAR; INTRALESIONAL; INTRAMUSCULAR; INTRAVENOUS; SOFT TISSUE at 22:23

## 2020-12-11 RX ADMIN — ENOXAPARIN SODIUM 40 MG: 40 INJECTION SUBCUTANEOUS at 22:22

## 2020-12-11 RX ADMIN — ENOXAPARIN SODIUM 40 MG: 40 INJECTION SUBCUTANEOUS at 09:34

## 2020-12-11 RX ADMIN — ZINC SULFATE 220 MG (50 MG) CAPSULE 50 MG: CAPSULE at 09:29

## 2020-12-11 RX ADMIN — ALBUTEROL SULFATE 2 PUFF: 90 AEROSOL, METERED RESPIRATORY (INHALATION) at 16:51

## 2020-12-11 RX ADMIN — INSULIN GLARGINE 30 UNITS: 100 INJECTION, SOLUTION SUBCUTANEOUS at 22:15

## 2020-12-11 RX ADMIN — REMDESIVIR 100 MG: 100 INJECTION, POWDER, LYOPHILIZED, FOR SOLUTION INTRAVENOUS at 09:37

## 2020-12-11 RX ADMIN — Medication 1000 MG: at 22:23

## 2020-12-11 RX ADMIN — MICONAZOLE NITRATE: 20 POWDER TOPICAL at 10:13

## 2020-12-11 RX ADMIN — DEXAMETHASONE SODIUM PHOSPHATE 6 MG: 4 INJECTION, SOLUTION INTRA-ARTICULAR; INTRALESIONAL; INTRAMUSCULAR; INTRAVENOUS; SOFT TISSUE at 12:01

## 2020-12-11 RX ADMIN — ALBUTEROL SULFATE 2 PUFF: 90 AEROSOL, METERED RESPIRATORY (INHALATION) at 22:19

## 2020-12-11 RX ADMIN — Medication 10 ML: at 22:23

## 2020-12-11 RX ADMIN — ATORVASTATIN CALCIUM 20 MG: 20 TABLET, FILM COATED ORAL at 22:23

## 2020-12-11 ASSESSMENT — PAIN SCALES - GENERAL: PAINLEVEL_OUTOF10: 0

## 2020-12-11 ASSESSMENT — ENCOUNTER SYMPTOMS
NAUSEA: 0
COUGH: 0
VOMITING: 0
SHORTNESS OF BREATH: 0
DIARRHEA: 0

## 2020-12-11 NOTE — PROGRESS NOTES
Physical Therapy    Facility/Department: [address] MED SURG/TELE  Initial Assessment    NAME: Ramon Hawk  : 1950  MRN: [id number]    Date of Service: 2020      Patient Diagnosis(es): The primary encounter diagnosis was Acute respiratory failure with hypoxia ([facility] [address] A diagnosis of Pneumonia due to COVID-19 virus was also pertinent to this visit. has a past medical history of Arthritis, Asthma, Bilateral carotid artery stenosis, CAD (coronary artery disease), COPD (chronic obstructive pulmonary disease) ([facility] [address] Diabetes mellitus ([facility] [address] Marcus's gangrene, Hyperlipidemia, Hypothyroidism, DEMARCUS (obstructive sleep apnea), Pneumonia, and Stroke ([facility] [address] has a past surgical history that includes Cardiac surgery; Coronary angioplasty with stent (2011); knee surgery; other surgical history (Left, 2015); other surgical history (3/2/15); other surgical history (Left, 3/2/2015); and eye surgery (Right, 2015). Referring Provider: LEONA Jensen - CNP        Evaluating Therapist: Yane De Leon PT      Room #:544  DIAGNOSIS: Acute respiratory failure with hypoxia  PRECAUTIONS: falls, O2, droplet plus isolaiton    Social:  Pt lives admitted from [place]. States he walks with a cane. Uses O2 at 3L     Initial Evaluation  Date: 20 Treatment      Short Term/ Long Term   Goals   Was pt agreeable to Eval/treatment?  yes     Does pt have pain? no     Bed Mobility  Rolling: mod asssit  Supine to sit: mod asssit  Sit to supine: mod assist  Scooting: mod assist  Min assist   Transfers Sit to stand: min assist  Stand to sit: min assist  Stand pivot: NT  CGA   Ambulation    3 feet with ww sidestepping along bed min assist  25 feet with ww with CGA   Stair Negotiation  Ascended and descended  NT   N/A   LE strength     3+/5    4-/5   balance      fair     AM-PAC Raw score                        Pt is alert and Oriented   LE ROM: WFL  Sensation: intact  Endurance: poor     ASSESSMENT  Pt displays functional ability as noted in the objective portion of this evaluation. Patient education  Pt educated on PT objectives    Patient response to education:   Pt verbalized understanding Pt demonstrated skill Pt requires further education in this area   yes         ASSESSMENT:    Comments/treatment: Pt in bed on bipap. SpO2 100%. Per nursing ok to switch to high flow at 15L. Pt assisted to edge of bed SpO2 86%. Recovered to 94% with cues for breathing technique. Pt stood to ww and took a few side steps along bed. Per nursing O2 in the 70's. Pt assisted to supine and reposition in bed. Nursing re applied bi pa    Patient and or family understand(s) diagnosis, prognosis, and plan of care. PLAN OF CARE:    Current Treatment Recommendations     [x] Strengthening     [] ROM   [x] Balance Training   [x] Endurance Training   [x] Transfer Training   [x] Gait Training   [] Stair Training   [] Positioning   [x] Safety and Education Training   [x] Patient/Caregiver Education   [x] HEP  [] Other     Frequency of treatments: 2-5x/week x 7.days    Time in  0940  Time out  1015    Total Treatment Time  12 minutes     Evaluation Time includes thorough review of current medical information, gathering information on past medical history/social history and prior level of function, completion of standardized testing/informal observation of tasks, assessment of data and education on plan of care and goals.     CPT codes:  [] Low Complexity PT evaluation 27231  [x] Moderate Complexity PT evaluation 81135  [] High Complexity PT evaluation 27627  [] PT Re-evaluation 48985  [] Gait training 60057 minutes  [] Manual therapy 96334 minutes  [x] Therapeutic activities 12 26348 minutes  [] Therapeutic exercises 30040 minutes  [] Neuromuscular reeducation 52939 minutes     [facility] PT [id number]

## 2020-12-11 NOTE — PROGRESS NOTES
Pulmonary Subsequent Hospital F/U note    Patient is being followed for: covid-19 pneumonia, acute hypoxic respiratory failure     Interval HPI:  Personally seen and examined. Appears comfortable on  bipap today. Nurse states oxygen burning his nose. Removed bipap placed on 6.5 L NC saturation 99% at rest. Will continue to wean as tolerated. Added humidity to O2. Occasional cough, dyspnea improved. ROS:  Denies fever, night sweats  +cough and dyspnea, no wheeze  Denies chest pain, edema, palpitations  Denies nausea    Exam:  BP (!) 118/57   Pulse 50   Temp 96.4 °F (35.8 °C) (Axillary)   Resp 18   Ht 5' 7\" (1.702 m)   Wt 250 lb (113.4 kg)   SpO2 (!) 88%   BMI 39.16 kg/m²    General: Lying in bed comfortably, no distress, breathing is not labored on current oxygen  HEENT: PERRL, EOMI, MMM, no oral lesions  Neck: supple, no adenopathy  CV: RRR without murmur  Lungs:  diminished and clear bilaterally. No wheeze. Symmetrical expansion. Abd: soft, NT, ND, bowel sounds normal  Ext: warm, no edema, no clubbing  Skin: no rashes  Neuro: CN II-XII grossly intact, no focal deficits    Data:    Oximetry:  SpO2 Readings from Last 1 Encounters:   12/11/20 (!) 88%       Imaging personally reviewed by myself:  CXR 12/6     Impression    Diffuse bilateral pneumonia.       Echocardiogram:  2015      Summary   Moderate concentric left ventricular hypertrophy was present.   Left ventricular regional wall motion and systolic function are normal.   There is doppler evidence for stage Ia diastolic dysfunction.   The left atrium is mildly dilated.   Mild mitral regurgitation is present.  Juris Regina is moderate aortic stenosis with a valve area of 1.2 cm2; a maximum   gradient of 28 mmHg and a mean gradient of 17 mmHg.     Labs:  11/30/2020  covid +  proBNP 4564  procalcitonin 6.01  CRP 15.4    Fibrinogen> 700  D dimer 827>598  IL-6 pending     Pertinent labs reviewed and noted:  Lab Results   Component Value Date    WBC 11.2 12/10/2020    HGB 12.1 12/10/2020    HCT 39.1 12/10/2020    MCV 83.2 12/10/2020    MCH 25.7 12/10/2020    MCHC 30.9 12/10/2020    RDW 15.9 12/10/2020     12/10/2020    MPV 11.0 12/10/2020     Lab Results   Component Value Date     12/10/2020    K 4.7 12/10/2020     12/10/2020    CO2 21 12/10/2020    BUN 81 12/10/2020    CREATININE 1.4 12/10/2020    LABALBU 2.8 12/10/2020    LABALBU 3.8 12/21/2011    CALCIUM 8.5 12/10/2020    GFRAA >60 12/10/2020    LABGLOM 50 12/10/2020     Lab Results   Component Value Date    PROTIME 14.0 12/10/2020    PROTIME 12.2 12/18/2011    INR 1.2 12/10/2020       Assessment:  1. Acute on chronic hypoxic respiratory failure  2. covid-19 pneumonia  3. Copd with acute exacerbation  4. JOSH  5. Possible superimposed bacterial pneumonia  6. Leukocytosis/lymphopenia  7. PAD   8. DEMARCUS non compliant with PAP  9. Nicotine dependence     Plan:  1. Wean oxygen 6.5 L high flow cannula as tolerated for saturation 90% and greater ,. Discussed with nursing  2. Nocturnal and prn bipap 16/8 fio2 40%  3. Bacterial urine antigens negative  4. IL-6 level 8.4  5. Decadron 6mg BID  6. Remdesivir 5 total days  7. CXR in am  8. DVT prophylaxis  9. Scheduled bronchodilators-Symbicort twice daily, Combivent as needed. incentive spirometer  10. . PT eval and treat  This plan of care is reviewed in collaboration with Dr. Kayleigh Torres  Electronically signed by LEONA Pederson - CNP on 12/11/2020 at 3:08 PM     I personally saw, examined, and cared for the patient. Labs, medications, radiographs reviewed. I agree with history exam and plans detailed in NP note.     Electronically signed by Charmayne Roys, MD on 12/11/2020 at 6:49 PM

## 2020-12-11 NOTE — PROGRESS NOTES
Progress Note  Date:2020       [facility]:[id number]  Patient Name:Jonathan Hernandez     YOB: 1950     Age:70 y.o. Patient seen for follow up of COVID-19 hypoxic respiratory failure. Patient this am awake and wearing bipap. He denies any fevers, chills, chest pains, shortness of breath, nausea, vomiting or diarrhea. Nursing staff at bedside. Subjective    Subjective:  Symptoms:  No shortness of breath, cough, chest pain, headache, chest pressure or diarrhea. Diet:  No nausea or vomiting. Review of Systems   Respiratory: Negative for cough and shortness of breath. Cardiovascular: Negative for chest pain. Gastrointestinal: Negative for diarrhea, nausea and vomiting. Objective         Vitals Last 24 Hours:  TEMPERATURE:  Temp  Av.9 °F (36.1 °C)  Min: 96 °F (35.6 °C)  Max: 97.8 °F (36.6 °C)  RESPIRATIONS RANGE: Resp  Av  Min: 16  Max: 24  PULSE OXIMETRY RANGE: SpO2  Av.3 %  Min: 93 %  Max: 97 %  PULSE RANGE: Pulse  Av  Min: 45  Max: 47  BLOOD PRESSURE RANGE: Systolic (38JOK), IMP:588 , Min:127 , RNV:870   ; Diastolic (77IGX), UMS:73, Min:63, Max:63    I/O (24Hr): Intake/Output Summary (Last 24 hours) at 2020 0404  Last data filed at 12/10/2020 2026  Gross per 24 hour   Intake --   Output 525 ml   Net -525 ml     Objective:  General Appearance:  Comfortable, well-appearing and in no acute distress. Vital signs: (most recent): Blood pressure 136/63, pulse (!) 47, temperature 97.8 °F (36.6 °C), temperature source Axillary, resp. rate 15, height 5' 7\" (1.702 m), weight 250 lb (113.4 kg), SpO2 96 %. Lungs:  Normal effort and normal respiratory rate. Breath sounds clear to auscultation. No rales or rhonchi. Heart: Bradycardia. Regular rhythm. S1 normal and S2 normal.  No friction rub. Abdomen: Abdomen is soft and non-distended. Bowel sounds are normal.   There is no abdominal tenderness. There is no rebound tenderness. There is no guarding.

## 2020-12-11 NOTE — CARE COORDINATION
9/25 Update CM Note: Patient discharged today back to [facility] where he is a bed hold under his [facility]. Covid done yesterday 9/24 and negative. Neda from [facility] to set up transport back to [facility]. Will follow for time of pickup.   Yanick Russell RN CM yes

## 2020-12-12 ENCOUNTER — APPOINTMENT (OUTPATIENT)
Dept: GENERAL RADIOLOGY | Age: 70
DRG: 177 | End: 2020-12-12
Payer: MEDICARE

## 2020-12-12 LAB
ALBUMIN SERPL-MCNC: 2.7 G/DL (ref 3.5–5.2)
ALP BLD-CCNC: 104 U/L (ref 40–129)
ALT SERPL-CCNC: 81 U/L (ref 0–40)
ANION GAP SERPL CALCULATED.3IONS-SCNC: 8 MMOL/L (ref 7–16)
APTT: 33.6 SEC (ref 24.5–35.1)
AST SERPL-CCNC: 40 U/L (ref 0–39)
BASOPHILS ABSOLUTE: 0.01 E9/L (ref 0–0.2)
BASOPHILS RELATIVE PERCENT: 0.1 % (ref 0–2)
BILIRUB SERPL-MCNC: 0.8 MG/DL (ref 0–1.2)
BUN BLDV-MCNC: 59 MG/DL (ref 8–23)
CALCIUM SERPL-MCNC: 8.3 MG/DL (ref 8.6–10.2)
CHLORIDE BLD-SCNC: 107 MMOL/L (ref 98–107)
CO2: 20 MMOL/L (ref 22–29)
CREAT SERPL-MCNC: 1.2 MG/DL (ref 0.7–1.2)
EOSINOPHILS ABSOLUTE: 0.01 E9/L (ref 0.05–0.5)
EOSINOPHILS RELATIVE PERCENT: 0.1 % (ref 0–6)
GFR AFRICAN AMERICAN: >60
GFR NON-AFRICAN AMERICAN: 59 ML/MIN/1.73
GLUCOSE BLD-MCNC: 275 MG/DL (ref 74–99)
HCT VFR BLD CALC: 39.7 % (ref 37–54)
HEMOGLOBIN: 12.2 G/DL (ref 12.5–16.5)
IMMATURE GRANULOCYTES #: 0.16 E9/L
IMMATURE GRANULOCYTES %: 1.5 % (ref 0–5)
INR BLD: 1.2
LYMPHOCYTES ABSOLUTE: 0.87 E9/L (ref 1.5–4)
LYMPHOCYTES RELATIVE PERCENT: 8.2 % (ref 20–42)
MCH RBC QN AUTO: 25.6 PG (ref 26–35)
MCHC RBC AUTO-ENTMCNC: 30.7 % (ref 32–34.5)
MCV RBC AUTO: 83.2 FL (ref 80–99.9)
METER GLUCOSE: 187 MG/DL (ref 74–99)
METER GLUCOSE: 248 MG/DL (ref 74–99)
METER GLUCOSE: 257 MG/DL (ref 74–99)
METER GLUCOSE: 295 MG/DL (ref 74–99)
MONOCYTES ABSOLUTE: 0.42 E9/L (ref 0.1–0.95)
MONOCYTES RELATIVE PERCENT: 4 % (ref 2–12)
NEUTROPHILS ABSOLUTE: 9.15 E9/L (ref 1.8–7.3)
NEUTROPHILS RELATIVE PERCENT: 86.1 % (ref 43–80)
PDW BLD-RTO: 16.1 FL (ref 11.5–15)
PLATELET # BLD: 334 E9/L (ref 130–450)
PMV BLD AUTO: 11.2 FL (ref 7–12)
POTASSIUM REFLEX MAGNESIUM: 5 MMOL/L (ref 3.5–5)
PROTHROMBIN TIME: 13.3 SEC (ref 9.3–12.4)
RBC # BLD: 4.77 E12/L (ref 3.8–5.8)
SODIUM BLD-SCNC: 135 MMOL/L (ref 132–146)
TOTAL PROTEIN: 6 G/DL (ref 6.4–8.3)
WBC # BLD: 10.6 E9/L (ref 4.5–11.5)

## 2020-12-12 PROCEDURE — 36415 COLL VENOUS BLD VENIPUNCTURE: CPT

## 2020-12-12 PROCEDURE — 6360000002 HC RX W HCPCS: Performed by: NURSE PRACTITIONER

## 2020-12-12 PROCEDURE — 94660 CPAP INITIATION&MGMT: CPT

## 2020-12-12 PROCEDURE — 2700000000 HC OXYGEN THERAPY PER DAY

## 2020-12-12 PROCEDURE — 94640 AIRWAY INHALATION TREATMENT: CPT

## 2020-12-12 PROCEDURE — 6370000000 HC RX 637 (ALT 250 FOR IP): Performed by: INTERNAL MEDICINE

## 2020-12-12 PROCEDURE — 71045 X-RAY EXAM CHEST 1 VIEW: CPT

## 2020-12-12 PROCEDURE — 2060000000 HC ICU INTERMEDIATE R&B

## 2020-12-12 PROCEDURE — 82962 GLUCOSE BLOOD TEST: CPT

## 2020-12-12 PROCEDURE — 80053 COMPREHEN METABOLIC PANEL: CPT

## 2020-12-12 PROCEDURE — 85610 PROTHROMBIN TIME: CPT

## 2020-12-12 PROCEDURE — 2580000003 HC RX 258: Performed by: INTERNAL MEDICINE

## 2020-12-12 PROCEDURE — 85025 COMPLETE CBC W/AUTO DIFF WBC: CPT

## 2020-12-12 PROCEDURE — 85730 THROMBOPLASTIN TIME PARTIAL: CPT

## 2020-12-12 PROCEDURE — 6360000002 HC RX W HCPCS: Performed by: INTERNAL MEDICINE

## 2020-12-12 RX ADMIN — ALBUTEROL SULFATE 2 PUFF: 90 AEROSOL, METERED RESPIRATORY (INHALATION) at 14:19

## 2020-12-12 RX ADMIN — SERTRALINE 75 MG: 50 TABLET, FILM COATED ORAL at 10:04

## 2020-12-12 RX ADMIN — ISOSORBIDE MONONITRATE 60 MG: 60 TABLET, EXTENDED RELEASE ORAL at 09:51

## 2020-12-12 RX ADMIN — BUDESONIDE AND FORMOTEROL FUMARATE DIHYDRATE 2 PUFF: 160; 4.5 AEROSOL RESPIRATORY (INHALATION) at 09:51

## 2020-12-12 RX ADMIN — MICONAZOLE NITRATE: 20 POWDER TOPICAL at 09:55

## 2020-12-12 RX ADMIN — INSULIN LISPRO 6 UNITS: 100 INJECTION, SOLUTION INTRAVENOUS; SUBCUTANEOUS at 14:13

## 2020-12-12 RX ADMIN — INSULIN LISPRO 3 UNITS: 100 INJECTION, SOLUTION INTRAVENOUS; SUBCUTANEOUS at 21:30

## 2020-12-12 RX ADMIN — ALBUTEROL SULFATE 2 PUFF: 90 AEROSOL, METERED RESPIRATORY (INHALATION) at 09:49

## 2020-12-12 RX ADMIN — BUDESONIDE AND FORMOTEROL FUMARATE DIHYDRATE 2 PUFF: 160; 4.5 AEROSOL RESPIRATORY (INHALATION) at 20:57

## 2020-12-12 RX ADMIN — Medication 1000 MG: at 21:28

## 2020-12-12 RX ADMIN — MONTELUKAST SODIUM 10 MG: 10 TABLET, FILM COATED ORAL at 21:28

## 2020-12-12 RX ADMIN — PANTOPRAZOLE SODIUM 40 MG: 40 TABLET, DELAYED RELEASE ORAL at 06:12

## 2020-12-12 RX ADMIN — Medication 10 ML: at 21:28

## 2020-12-12 RX ADMIN — ZINC SULFATE 220 MG (50 MG) CAPSULE 50 MG: CAPSULE at 09:51

## 2020-12-12 RX ADMIN — DEXAMETHASONE SODIUM PHOSPHATE 6 MG: 4 INJECTION, SOLUTION INTRA-ARTICULAR; INTRALESIONAL; INTRAMUSCULAR; INTRAVENOUS; SOFT TISSUE at 22:00

## 2020-12-12 RX ADMIN — CLOPIDOGREL BISULFATE 75 MG: 75 TABLET ORAL at 09:50

## 2020-12-12 RX ADMIN — Medication 1000 MG: at 09:50

## 2020-12-12 RX ADMIN — ALBUTEROL SULFATE 2 PUFF: 90 AEROSOL, METERED RESPIRATORY (INHALATION) at 17:03

## 2020-12-12 RX ADMIN — Medication 10 ML: at 10:04

## 2020-12-12 RX ADMIN — ATORVASTATIN CALCIUM 20 MG: 20 TABLET, FILM COATED ORAL at 21:28

## 2020-12-12 RX ADMIN — INSULIN LISPRO 2 UNITS: 100 INJECTION, SOLUTION INTRAVENOUS; SUBCUTANEOUS at 10:03

## 2020-12-12 RX ADMIN — DEXAMETHASONE SODIUM PHOSPHATE 6 MG: 4 INJECTION, SOLUTION INTRA-ARTICULAR; INTRALESIONAL; INTRAMUSCULAR; INTRAVENOUS; SOFT TISSUE at 14:19

## 2020-12-12 RX ADMIN — LEVOTHYROXINE SODIUM 150 MCG: 75 TABLET ORAL at 06:12

## 2020-12-12 RX ADMIN — ENOXAPARIN SODIUM 40 MG: 40 INJECTION SUBCUTANEOUS at 21:28

## 2020-12-12 RX ADMIN — ENOXAPARIN SODIUM 40 MG: 40 INJECTION SUBCUTANEOUS at 09:50

## 2020-12-12 RX ADMIN — CARVEDILOL 6.25 MG: 6.25 TABLET, FILM COATED ORAL at 17:04

## 2020-12-12 RX ADMIN — MICONAZOLE NITRATE: 20 POWDER TOPICAL at 21:29

## 2020-12-12 RX ADMIN — ASPIRIN 325 MG: 325 TABLET, COATED ORAL at 09:50

## 2020-12-12 RX ADMIN — ALBUTEROL SULFATE 2 PUFF: 90 AEROSOL, METERED RESPIRATORY (INHALATION) at 20:57

## 2020-12-12 RX ADMIN — INSULIN GLARGINE 30 UNITS: 100 INJECTION, SOLUTION SUBCUTANEOUS at 21:30

## 2020-12-12 RX ADMIN — INSULIN LISPRO 4 UNITS: 100 INJECTION, SOLUTION INTRAVENOUS; SUBCUTANEOUS at 17:12

## 2020-12-12 ASSESSMENT — ENCOUNTER SYMPTOMS
NAUSEA: 0
DIARRHEA: 0
SHORTNESS OF BREATH: 0
COUGH: 0
VOMITING: 0

## 2020-12-12 ASSESSMENT — PAIN SCALES - GENERAL
PAINLEVEL_OUTOF10: 0

## 2020-12-12 NOTE — PROGRESS NOTES
Progress Note  Date:2020       ZHXA:[id number]  Patient Name:Jonathan Hernandez     YOB: 1950     Age:70 y.o. Patient seen for follow up of COVID-19 hypoxic respiratory failure. Patient this am awake and wearing bipap. He denies any fevers, chills, chest pains, shortness of breath, nausea, vomiting or diarrhea  He states he is feeling much better  Subjective    Subjective:  Symptoms:  No shortness of breath, cough, chest pain, headache, chest pressure or diarrhea. Diet:  No nausea or vomiting. Review of Systems   Respiratory: Negative for cough and shortness of breath. Cardiovascular: Negative for chest pain. Gastrointestinal: Negative for diarrhea, nausea and vomiting. Objective         Vitals Last 24 Hours:  TEMPERATURE:  Temp  Av.3 °F (36.3 °C)  Min: 97.1 °F (36.2 °C)  Max: 97.5 °F (36.4 °C)  RESPIRATIONS RANGE: Resp  Av.7  Min: 18  Max: 22  PULSE OXIMETRY RANGE: SpO2  Av.3 %  Min: 82 %  Max: 99 %  PULSE RANGE: Pulse  Av  Min: 47  Max: 50  BLOOD PRESSURE RANGE: Systolic (77BFX), YKZ:808 , Min:102 , MPM:044   ; Diastolic (79AMT), UQP:71, Min:51, Max:60    I/O (24Hr): Intake/Output Summary (Last 24 hours) at 2020 1301  Last data filed at 2020 7486  Gross per 24 hour   Intake --   Output 800 ml   Net -800 ml     Objective:  General Appearance:  Comfortable, well-appearing and in no acute distress. Vital signs: (most recent): Blood pressure (!) 122/56, pulse 50, temperature 97.1 °F (36.2 °C), temperature source Axillary, resp. rate 20, height 5' 7\" (1.702 m), weight 257 lb (116.6 kg), SpO2 96 %. Lungs:  Normal effort and normal respiratory rate. Breath sounds clear to auscultation. No rales or rhonchi. Heart: Bradycardia. Regular rhythm. S1 normal and S2 normal.  No friction rub. Abdomen: Abdomen is soft and non-distended. Bowel sounds are normal.   There is no abdominal tenderness. There is no rebound tenderness.   There is no guarding. Extremities: Normal range of motion. There is no dependent edema. Skin:  Warm and dry. Labs/Imaging/Diagnostics    Labs:  CBC:  Recent Labs     12/10/20  1610 12/11/20  1530 12/12/20  1045   WBC 11.2 10.7 10.6   RBC 4.70 4.59 4.77   HGB 12.1* 12.0* 12.2*   HCT 39.1 37.8 39.7   MCV 83.2 82.4 83.2   RDW 15.9* 16.2* 16.1*    317 334     CHEMISTRIES:  Recent Labs     12/10/20  1610 12/11/20  1530 12/12/20  1045    136 135   K 4.7 5.2* 5.0    106 107   CO2 21* 22 20*   BUN 81* 71* 59*   CREATININE 1.4* 1.3* 1.2   GLUCOSE 268* 300* 275*     PT/INR:  Recent Labs     12/10/20  1610 12/11/20  1530 12/12/20  1045   PROTIME 14.0* 13.3* 13.3*   INR 1.2 1.2 1.2     APTT:  Recent Labs     12/10/20  1610 12/11/20  1530 12/12/20  1045   APTT 34.4 34.3 33.6     LIVER PROFILE:  Recent Labs     12/10/20  1610 12/11/20  1530 12/12/20  1045   AST 46* 51* 40*   ALT 64* 71* 81*   BILITOT 0.6 0.6 0.8   ALKPHOS 110 109 104       Imaging Last 24 Hours:  No results found. Assessment//Plan           Hospital Problems           Last Modified POA    Acute respiratory failure with hypoxia ([facility] [address] 12/7/2020 Yes        Assessment & Plan  1. Acute on Chronic Hypoxic Respiratory Failure  2. COVID-19 Pneumonia  3. JOSH   4. Elevated LFTs  5. Diabetes Mellitus Type II  6. Acute Exacerbation of COPD  7. CAD  8. HTN  9. Fungal dermatitis  Overall much improved   Continue on current remdesivir and dexamethasone. Continue to wean oxygen as tolerated.      Stephania Jaramillo MD

## 2020-12-12 NOTE — PROGRESS NOTES
Date: 12/11/2020    Time: 10:36 PM    Patient Placed On BIPAP/CPAP/ Non-Invasive Ventilation? Yes    Facial area red/color change? No         BIPAP/CPAP skin barrier?   Yes    Skin barrier type:mepilexlite       Comments:     12/11/20 4925   NIV Type   NIV Started/Stopped On   Equipment Type V60   Mode Bilevel   Mask Type Full face mask   Mask Size Large   Settings/Measurements   IPAP 14 cmH20   CPAP/EPAP 6 cmH2O   Rate Ordered 14   Resp 19   Insp Rise Time (%) 2 %   FiO2  40 %   I Time/ I Time % 0.95 s   Vt Exhaled 787 mL   Minute Volume 18 Liters   Mask Leak (lpm) 77 lpm   Comfort Level Good   Using Accessory Muscles No   SpO2 97         Carol Holland

## 2020-12-13 LAB
ALBUMIN SERPL-MCNC: 2.9 G/DL (ref 3.5–5.2)
ALP BLD-CCNC: 100 U/L (ref 40–129)
ALT SERPL-CCNC: 74 U/L (ref 0–40)
ANION GAP SERPL CALCULATED.3IONS-SCNC: 9 MMOL/L (ref 7–16)
ANISOCYTOSIS: ABNORMAL
APTT: 36.1 SEC (ref 24.5–35.1)
AST SERPL-CCNC: 29 U/L (ref 0–39)
BASOPHILS ABSOLUTE: 0.03 E9/L (ref 0–0.2)
BASOPHILS RELATIVE PERCENT: 0.2 % (ref 0–2)
BILIRUB SERPL-MCNC: 0.7 MG/DL (ref 0–1.2)
BUN BLDV-MCNC: 54 MG/DL (ref 8–23)
BURR CELLS: ABNORMAL
CALCIUM SERPL-MCNC: 8.5 MG/DL (ref 8.6–10.2)
CHLORIDE BLD-SCNC: 107 MMOL/L (ref 98–107)
CO2: 21 MMOL/L (ref 22–29)
CREAT SERPL-MCNC: 1.2 MG/DL (ref 0.7–1.2)
EOSINOPHILS ABSOLUTE: 0 E9/L (ref 0.05–0.5)
EOSINOPHILS RELATIVE PERCENT: 0 % (ref 0–6)
GFR AFRICAN AMERICAN: >60
GFR NON-AFRICAN AMERICAN: 59 ML/MIN/1.73
GLUCOSE BLD-MCNC: 224 MG/DL (ref 74–99)
HCT VFR BLD CALC: 38.6 % (ref 37–54)
HEMOGLOBIN: 12.3 G/DL (ref 12.5–16.5)
IMMATURE GRANULOCYTES #: 0.21 E9/L
IMMATURE GRANULOCYTES %: 1.7 % (ref 0–5)
INR BLD: 1.2
LYMPHOCYTES ABSOLUTE: 0.59 E9/L (ref 1.5–4)
LYMPHOCYTES RELATIVE PERCENT: 4.9 % (ref 20–42)
MCH RBC QN AUTO: 26.1 PG (ref 26–35)
MCHC RBC AUTO-ENTMCNC: 31.9 % (ref 32–34.5)
MCV RBC AUTO: 81.8 FL (ref 80–99.9)
METER GLUCOSE: 256 MG/DL (ref 74–99)
METER GLUCOSE: 260 MG/DL (ref 74–99)
METER GLUCOSE: 301 MG/DL (ref 74–99)
METER GLUCOSE: 301 MG/DL (ref 74–99)
MONOCYTES ABSOLUTE: 0.32 E9/L (ref 0.1–0.95)
MONOCYTES RELATIVE PERCENT: 2.6 % (ref 2–12)
NEUTROPHILS ABSOLUTE: 10.94 E9/L (ref 1.8–7.3)
NEUTROPHILS RELATIVE PERCENT: 90.6 % (ref 43–80)
OVALOCYTES: ABNORMAL
PDW BLD-RTO: 16 FL (ref 11.5–15)
PLATELET # BLD: 322 E9/L (ref 130–450)
PMV BLD AUTO: 11.5 FL (ref 7–12)
POIKILOCYTES: ABNORMAL
POTASSIUM REFLEX MAGNESIUM: 5.1 MMOL/L (ref 3.5–5)
PROTHROMBIN TIME: 14 SEC (ref 9.3–12.4)
RBC # BLD: 4.72 E12/L (ref 3.8–5.8)
SCHISTOCYTES: ABNORMAL
SODIUM BLD-SCNC: 137 MMOL/L (ref 132–146)
TOTAL PROTEIN: 6.3 G/DL (ref 6.4–8.3)
WBC # BLD: 12.1 E9/L (ref 4.5–11.5)

## 2020-12-13 PROCEDURE — 2060000000 HC ICU INTERMEDIATE R&B

## 2020-12-13 PROCEDURE — 2700000000 HC OXYGEN THERAPY PER DAY

## 2020-12-13 PROCEDURE — 85610 PROTHROMBIN TIME: CPT

## 2020-12-13 PROCEDURE — 94660 CPAP INITIATION&MGMT: CPT

## 2020-12-13 PROCEDURE — 6360000002 HC RX W HCPCS: Performed by: NURSE PRACTITIONER

## 2020-12-13 PROCEDURE — 36415 COLL VENOUS BLD VENIPUNCTURE: CPT

## 2020-12-13 PROCEDURE — 6370000000 HC RX 637 (ALT 250 FOR IP): Performed by: INTERNAL MEDICINE

## 2020-12-13 PROCEDURE — 85025 COMPLETE CBC W/AUTO DIFF WBC: CPT

## 2020-12-13 PROCEDURE — 82962 GLUCOSE BLOOD TEST: CPT

## 2020-12-13 PROCEDURE — 2580000003 HC RX 258: Performed by: INTERNAL MEDICINE

## 2020-12-13 PROCEDURE — 85730 THROMBOPLASTIN TIME PARTIAL: CPT

## 2020-12-13 PROCEDURE — 6360000002 HC RX W HCPCS: Performed by: INTERNAL MEDICINE

## 2020-12-13 PROCEDURE — 80053 COMPREHEN METABOLIC PANEL: CPT

## 2020-12-13 RX ADMIN — INSULIN GLARGINE 30 UNITS: 100 INJECTION, SOLUTION SUBCUTANEOUS at 22:30

## 2020-12-13 RX ADMIN — INSULIN LISPRO 8 UNITS: 100 INJECTION, SOLUTION INTRAVENOUS; SUBCUTANEOUS at 17:31

## 2020-12-13 RX ADMIN — INSULIN LISPRO 6 UNITS: 100 INJECTION, SOLUTION INTRAVENOUS; SUBCUTANEOUS at 12:31

## 2020-12-13 RX ADMIN — MONTELUKAST SODIUM 10 MG: 10 TABLET, FILM COATED ORAL at 22:30

## 2020-12-13 RX ADMIN — ALBUTEROL SULFATE 2 PUFF: 90 AEROSOL, METERED RESPIRATORY (INHALATION) at 22:30

## 2020-12-13 RX ADMIN — LEVOTHYROXINE SODIUM 150 MCG: 75 TABLET ORAL at 06:16

## 2020-12-13 RX ADMIN — INSULIN LISPRO 4 UNITS: 100 INJECTION, SOLUTION INTRAVENOUS; SUBCUTANEOUS at 22:30

## 2020-12-13 RX ADMIN — ATORVASTATIN CALCIUM 20 MG: 20 TABLET, FILM COATED ORAL at 22:30

## 2020-12-13 RX ADMIN — MICONAZOLE NITRATE: 20 POWDER TOPICAL at 11:02

## 2020-12-13 RX ADMIN — BUDESONIDE AND FORMOTEROL FUMARATE DIHYDRATE 2 PUFF: 160; 4.5 AEROSOL RESPIRATORY (INHALATION) at 11:01

## 2020-12-13 RX ADMIN — Medication 10 ML: at 22:30

## 2020-12-13 RX ADMIN — ENOXAPARIN SODIUM 40 MG: 40 INJECTION SUBCUTANEOUS at 10:59

## 2020-12-13 RX ADMIN — Medication 1000 MG: at 11:00

## 2020-12-13 RX ADMIN — ALBUTEROL SULFATE 2 PUFF: 90 AEROSOL, METERED RESPIRATORY (INHALATION) at 17:31

## 2020-12-13 RX ADMIN — BUDESONIDE AND FORMOTEROL FUMARATE DIHYDRATE 2 PUFF: 160; 4.5 AEROSOL RESPIRATORY (INHALATION) at 22:31

## 2020-12-13 RX ADMIN — CLOPIDOGREL BISULFATE 75 MG: 75 TABLET ORAL at 11:01

## 2020-12-13 RX ADMIN — ALBUTEROL SULFATE 2 PUFF: 90 AEROSOL, METERED RESPIRATORY (INHALATION) at 10:58

## 2020-12-13 RX ADMIN — SERTRALINE 75 MG: 50 TABLET, FILM COATED ORAL at 11:01

## 2020-12-13 RX ADMIN — ALBUTEROL SULFATE 2 PUFF: 90 AEROSOL, METERED RESPIRATORY (INHALATION) at 12:31

## 2020-12-13 RX ADMIN — PANTOPRAZOLE SODIUM 40 MG: 40 TABLET, DELAYED RELEASE ORAL at 06:16

## 2020-12-13 RX ADMIN — ISOSORBIDE MONONITRATE 60 MG: 60 TABLET, EXTENDED RELEASE ORAL at 11:01

## 2020-12-13 RX ADMIN — MAGNESIUM HYDROXIDE 30 ML: 400 SUSPENSION ORAL at 11:07

## 2020-12-13 RX ADMIN — DEXAMETHASONE SODIUM PHOSPHATE 6 MG: 4 INJECTION, SOLUTION INTRA-ARTICULAR; INTRALESIONAL; INTRAMUSCULAR; INTRAVENOUS; SOFT TISSUE at 12:31

## 2020-12-13 RX ADMIN — INSULIN LISPRO 6 UNITS: 100 INJECTION, SOLUTION INTRAVENOUS; SUBCUTANEOUS at 10:30

## 2020-12-13 RX ADMIN — ZINC SULFATE 220 MG (50 MG) CAPSULE 50 MG: CAPSULE at 11:01

## 2020-12-13 RX ADMIN — ASPIRIN 325 MG: 325 TABLET, COATED ORAL at 11:00

## 2020-12-13 RX ADMIN — DEXAMETHASONE SODIUM PHOSPHATE 6 MG: 4 INJECTION, SOLUTION INTRA-ARTICULAR; INTRALESIONAL; INTRAMUSCULAR; INTRAVENOUS; SOFT TISSUE at 22:29

## 2020-12-13 RX ADMIN — ENOXAPARIN SODIUM 40 MG: 40 INJECTION SUBCUTANEOUS at 22:29

## 2020-12-13 RX ADMIN — Medication 10 ML: at 11:02

## 2020-12-13 RX ADMIN — MICONAZOLE NITRATE: 20 POWDER TOPICAL at 22:30

## 2020-12-13 RX ADMIN — Medication 1000 MG: at 22:30

## 2020-12-13 ASSESSMENT — PAIN SCALES - GENERAL
PAINLEVEL_OUTOF10: 0
PAINLEVEL_OUTOF10: 0

## 2020-12-13 ASSESSMENT — ENCOUNTER SYMPTOMS
SHORTNESS OF BREATH: 0
DIARRHEA: 0
VOMITING: 0
NAUSEA: 0
COUGH: 0

## 2020-12-13 NOTE — PROGRESS NOTES
Sam Aldrich M.D.,[facility]  Joselo Rosas D.O., F.A.C.O.I., Tammie Nuno M.D. Nuria Shannon M.D., Jaxson Mckeon M.D. Rey Walton D.O. Daily Pulmonary Progress Note    Patient:  Yvrose Simental 65 y.o. male MRN: [id number]     Date of Service: 12/13/2020      Synopsis     We are following patient for COVID-19 pneumonia, acute hypoxic respiratory failure    \"CC\" shortness of breath    Code status: [facility]      Subjective      Patient was seen and examined lying in bed in no apparent distress. He has only an occasional nonproductive cough. He is currently on 6L oxygen. He did wear bilevel overnight. Review of Systems:  Constitutional: Denies fever, weight loss, night sweats, and fatigue  Skin: Denies pigmentation, dark lesions, and rashes   HEENT: Denies hearing loss, tinnitus, ear drainage, epistaxis, sore throat, and hoarseness. Cardiovascular: Denies palpitations, chest pain, and chest pressure. Respiratory: Denies cough, dyspnea at rest, hemoptysis, apnea, and choking.   Gastrointestinal: Denies nausea, vomiting, poor appetite, diarrhea, heartburn or reflux  Genitourinary: Denies dysuria, frequency, urgency or hematuria  Musculoskeletal: Denies myalgias, muscle weakness, and bone pain  Neurological: Denies dizziness, vertigo, headache, and focal weakness  Psychological: Denies anxiety and depression  Endocrine: Denies heat intolerance and cold intolerance  Hematopoietic/Lymphatic: Denies bleeding problems and blood transfusions    24-hour events:  none    Objective   Vitals: BP (!) 116/58   Pulse (!) 48   Temp 97.1 °F (36.2 °C) (Axillary)   Resp 22   Ht 5' 7\" (1.702 m)   Wt 257 lb (116.6 kg)   SpO2 93%   BMI 40.25 kg/m²     I/O:    Intake/Output Summary (Last 24 hours) at 12/13/2020 0943  Last data filed at 12/13/2020 0620  Gross per 24 hour   Intake --   Output 600 ml   Net -600 ml       Vent Information  Skin Assessment: Clean, dry, & intact  FiO2 : 40 %  SpO2: 93 %  SpO2/FiO2 ratio: 184  I Time/ I Time %: 0.95 s  Mask Type: Full face mask  Mask Size: Large       IPAP: 14 cmH20  CPAP/EPAP: 6 cmH2O     CURRENT MEDS :  Scheduled Meds:   dexamethasone  6 mg Intravenous Q12H    insulin glargine  30 Units Subcutaneous Nightly    insulin lispro  0-12 Units Subcutaneous TID WC    insulin lispro  0-6 Units Subcutaneous Nightly    sodium chloride flush  10 mL Intravenous 2 times per day    aspirin  325 mg Oral Daily    atorvastatin  20 mg Oral Daily    carvedilol  6.25 mg Oral BID WC    clopidogrel  75 mg Oral Daily    isosorbide mononitrate  60 mg Oral Daily    levothyroxine  150 mcg Oral Daily    montelukast  10 mg Oral Nightly    pantoprazole  40 mg Oral QAM AC    sertraline  75 mg Oral Daily    enoxaparin  40 mg Subcutaneous BID    miconazole   Topical BID    budesonide-formoterol  2 puff Inhalation BID    albuterol sulfate HFA  2 puff Inhalation 4x daily    vitamin C  1,000 mg Oral BID    zinc sulfate  50 mg Oral Daily       Physical Exam:  General Appearance: appears comfortable in no acute distress. HEENT: Normocephalic atraumatic without obvious abnormality   Neck: Lips, mucosa, and tongue normal.  Supple, symmetrical, trachea midline, no adenopathy;thyroid:  no enlargement/tenderness/nodules or JVD. Lung: Breath sounds CTA, diminished. Respirations   unlabored. Symmetrical expansion. Heart: RRR, normal S1, S2. No MRG  Abdomen: Soft, NT, ND. BS present x 4 quadrants. No bruit or organomegaly. Extremities: Pedal pulses 2+ symmetric b/l. Extremities normal, no cyanosis, clubbing, or edema. Musculokeletal: No joint swelling, no muscle tenderness. ROM normal in all joints of extremities. Neurologic: Mental status: Alert and Oriented X3 . Pertinent/ New Labs and Imaging Studies     Imaging Personally Reviewed:  12/12 cxr  Stable abnormal chest with diffuse infiltrates with marginal improvement   likely pneumonia.      ECHO  3/25/2015    Labs:  Lab Results   Component Value Date    WBC 12.1 12/13/2020    HGB 12.3 12/13/2020    HCT 38.6 12/13/2020    MCV 81.8 12/13/2020    MCH 26.1 12/13/2020    MCHC 31.9 12/13/2020    RDW 16.0 12/13/2020     12/13/2020    MPV 11.5 12/13/2020     Lab Results   Component Value Date     12/13/2020    K 5.1 12/13/2020     12/13/2020    CO2 21 12/13/2020    BUN 54 12/13/2020    CREATININE 1.2 12/13/2020    LABALBU 2.9 12/13/2020    LABALBU 3.8 12/21/2011    CALCIUM 8.5 12/13/2020    GFRAA >60 12/13/2020    LABGLOM 59 12/13/2020     Lab Results   Component Value Date    PROTIME 14.0 12/13/2020    PROTIME 12.2 12/18/2011    INR 1.2 12/13/2020     No results for input(s): PROBNP in the last 72 hours. No results for input(s): PROCAL in the last 72 hours. This SmartLink has not been configured with any valid records. Assessment:    1. Acute on chronic hypoxic respiratory failure  2. covid-19 pneumonia  3. Copd with acute exacerbation  4. JOSH  5. Possible superimposed bacterial pneumonia  6. Leukocytosis/lymphopenia  7. PAD   8. DEMARCUS non compliant with PAP  9. Nicotine dependence       Plan:   1. Wean oxygen 6.5 L high flow cannula as tolerated for saturation 90% and greater ,. Discussed with nursing  2. Nocturnal and prn bipap 16/8 fio2 40%  3. Bacterial urine antigens negative  4. IL-6 level 8.4  5. Decadron 6mg BID  6. Remdesivir completed  7. CXR in am, see above  8. DVT prophylaxis  9. Scheduled bronchodilators-Symbicort twice daily, Combivent as needed. incentive spirometer  10. . PT eval and treat, encourage increase in activity    This plan of care was reviewed in collaboration with Dr. Gilles Mosley  Electronically signed by LEONA Christy CNP on 12/13/2020 at 9:43 AM      I personally saw, examined, and cared for the patient. Labs, medications, radiographs reviewed. I agree with history exam and plans detailed in NP note. Vikram Lance M.D.    Pulmonary/Critical Care Medicine

## 2020-12-13 NOTE — PROGRESS NOTES
Progress Note  Date:2020       HZNY:[id number]  Patient Name:Jonathan Hernandez     YOB: 1950     Age:70 y.o. Patient seen for follow up of COVID-19 hypoxic respiratory failure. Patient this am awake and wearing bipap. He denies any fevers, chills, chest pains, shortness of breath, nausea, vomiting or diarrhea  He states he is feeling much better  Subjective    Subjective:  Symptoms:  No shortness of breath, cough, chest pain, headache, chest pressure or diarrhea. Diet:  No nausea or vomiting. Review of Systems   Respiratory: Negative for cough and shortness of breath. Cardiovascular: Negative for chest pain. Gastrointestinal: Negative for diarrhea, nausea and vomiting. Objective         Vitals Last 24 Hours:  TEMPERATURE:  Temp  Av.4 °F (36.3 °C)  Min: 96.7 °F (35.9 °C)  Max: 97.9 °F (36.6 °C)  RESPIRATIONS RANGE: Resp  Av  Min: 19  Max: 22  PULSE OXIMETRY RANGE: SpO2  Av.5 %  Min: 93 %  Max: 97 %  PULSE RANGE: Pulse  Av.3  Min: 46  Max: 57  BLOOD PRESSURE RANGE: Systolic (45GZE), QMQ:989 , Min:115 , VNS:650   ; Diastolic (97XXQ), LVK:49, Min:54, Max:66    I/O (24Hr): Intake/Output Summary (Last 24 hours) at 2020 1210  Last data filed at 2020 1104  Gross per 24 hour   Intake --   Output 1000 ml   Net -1000 ml     Objective:  General Appearance:  Comfortable, well-appearing and in no acute distress. Vital signs: (most recent): Blood pressure 122/62, pulse 50, temperature 97.9 °F (36.6 °C), temperature source Axillary, resp. rate 19, height 5' 7\" (1.702 m), weight 257 lb (116.6 kg), SpO2 94 %. Lungs:  Normal effort and normal respiratory rate. Breath sounds clear to auscultation. No rales or rhonchi. Heart: Bradycardia. Regular rhythm. S1 normal and S2 normal.  No friction rub. Abdomen: Abdomen is soft and non-distended. Bowel sounds are normal.   There is no abdominal tenderness. There is no rebound tenderness.   There is no guarding. Extremities: Normal range of motion. There is no dependent edema. Skin:  Warm and dry. Labs/Imaging/Diagnostics    Labs:  CBC:  Recent Labs     12/11/20  1530 12/12/20  1045 12/13/20  0305   WBC 10.7 10.6 12.1*   RBC 4.59 4.77 4.72   HGB 12.0* 12.2* 12.3*   HCT 37.8 39.7 38.6   MCV 82.4 83.2 81.8   RDW 16.2* 16.1* 16.0*    334 322     CHEMISTRIES:  Recent Labs     12/11/20  1530 12/12/20  1045 12/13/20  0305    135 137   K 5.2* 5.0 5.1*    107 107   CO2 22 20* 21*   BUN 71* 59* 54*   CREATININE 1.3* 1.2 1.2   GLUCOSE 300* 275* 224*     PT/INR:  Recent Labs     12/11/20  1530 12/12/20  1045 12/13/20  0305   PROTIME 13.3* 13.3* 14.0*   INR 1.2 1.2 1.2     APTT:  Recent Labs     12/11/20  1530 12/12/20  1045 12/13/20  0305   APTT 34.3 33.6 36.1*     LIVER PROFILE:  Recent Labs     12/11/20  1530 12/12/20  1045 12/13/20  0305   AST 51* 40* 29   ALT 71* 81* 74*   BILITOT 0.6 0.8 0.7   ALKPHOS 109 104 100       Imaging Last 24 Hours:  No results found. Assessment//Plan           Hospital Problems           Last Modified POA    Acute respiratory failure with hypoxia ([facility] [address] 12/7/2020 Yes        Assessment & Plan  1. Acute on Chronic Hypoxic Respiratory Failure  2. COVID-19 Pneumonia  3. JOSH   4. Elevated LFTs  5. Diabetes Mellitus Type II  6. Acute Exacerbation of COPD  7. CAD  8. HTN  9. Fungal dermatitis  Overall much improved   Continue on current remdesivir and dexamethasone. Continue to wean oxygen as tolerated.      Aurelia Day MD

## 2020-12-13 NOTE — PROGRESS NOTES
Pt did not want to get up to chair at this time. Bathed in bed with soap and water. Complete linen change. EKG leads changed. Incentive spirometer done. Goal = 1500. 1250 ml achieved. 91-94% on 6 L. Will wean as tolerated.

## 2020-12-14 PROBLEM — J12.82 PNEUMONIA DUE TO COVID-19 VIRUS: Status: ACTIVE | Noted: 2020-12-14

## 2020-12-14 PROBLEM — Z86.73 HISTORY OF STROKE: Status: ACTIVE | Noted: 2020-09-24

## 2020-12-14 PROBLEM — U07.1 PNEUMONIA DUE TO COVID-19 VIRUS: Status: ACTIVE | Noted: 2020-12-14

## 2020-12-14 LAB
ALBUMIN SERPL-MCNC: 2.7 G/DL (ref 3.5–5.2)
ALP BLD-CCNC: 96 U/L (ref 40–129)
ALT SERPL-CCNC: 80 U/L (ref 0–40)
ANION GAP SERPL CALCULATED.3IONS-SCNC: 6 MMOL/L (ref 7–16)
APTT: 33.9 SEC (ref 24.5–35.1)
AST SERPL-CCNC: 39 U/L (ref 0–39)
BASOPHILS ABSOLUTE: 0.02 E9/L (ref 0–0.2)
BASOPHILS RELATIVE PERCENT: 0.2 % (ref 0–2)
BILIRUB SERPL-MCNC: 0.7 MG/DL (ref 0–1.2)
BUN BLDV-MCNC: 40 MG/DL (ref 8–23)
CALCIUM SERPL-MCNC: 8.3 MG/DL (ref 8.6–10.2)
CHLORIDE BLD-SCNC: 107 MMOL/L (ref 98–107)
CO2: 21 MMOL/L (ref 22–29)
CREAT SERPL-MCNC: 1 MG/DL (ref 0.7–1.2)
EOSINOPHILS ABSOLUTE: 0.01 E9/L (ref 0.05–0.5)
EOSINOPHILS RELATIVE PERCENT: 0.1 % (ref 0–6)
GFR AFRICAN AMERICAN: >60
GFR NON-AFRICAN AMERICAN: >60 ML/MIN/1.73
GLUCOSE BLD-MCNC: 309 MG/DL (ref 74–99)
HCT VFR BLD CALC: 37.1 % (ref 37–54)
HEMOGLOBIN: 11.9 G/DL (ref 12.5–16.5)
IMMATURE GRANULOCYTES #: 0.24 E9/L
IMMATURE GRANULOCYTES %: 2 % (ref 0–5)
INR BLD: 1.1
LYMPHOCYTES ABSOLUTE: 0.78 E9/L (ref 1.5–4)
LYMPHOCYTES RELATIVE PERCENT: 6.5 % (ref 20–42)
MCH RBC QN AUTO: 26 PG (ref 26–35)
MCHC RBC AUTO-ENTMCNC: 32.1 % (ref 32–34.5)
MCV RBC AUTO: 81.2 FL (ref 80–99.9)
METER GLUCOSE: 253 MG/DL (ref 74–99)
METER GLUCOSE: 286 MG/DL (ref 74–99)
METER GLUCOSE: 295 MG/DL (ref 74–99)
METER GLUCOSE: 303 MG/DL (ref 74–99)
METER GLUCOSE: 333 MG/DL (ref 74–99)
MONOCYTES ABSOLUTE: 0.43 E9/L (ref 0.1–0.95)
MONOCYTES RELATIVE PERCENT: 3.6 % (ref 2–12)
NEUTROPHILS ABSOLUTE: 10.52 E9/L (ref 1.8–7.3)
NEUTROPHILS RELATIVE PERCENT: 87.6 % (ref 43–80)
PDW BLD-RTO: 16.2 FL (ref 11.5–15)
PLATELET # BLD: 335 E9/L (ref 130–450)
PMV BLD AUTO: 11.4 FL (ref 7–12)
POTASSIUM REFLEX MAGNESIUM: 5.2 MMOL/L (ref 3.5–5)
PROTHROMBIN TIME: 13 SEC (ref 9.3–12.4)
RBC # BLD: 4.57 E12/L (ref 3.8–5.8)
SODIUM BLD-SCNC: 134 MMOL/L (ref 132–146)
TOTAL PROTEIN: 5.7 G/DL (ref 6.4–8.3)
WBC # BLD: 12 E9/L (ref 4.5–11.5)

## 2020-12-14 PROCEDURE — 85025 COMPLETE CBC W/AUTO DIFF WBC: CPT

## 2020-12-14 PROCEDURE — 85610 PROTHROMBIN TIME: CPT

## 2020-12-14 PROCEDURE — 6370000000 HC RX 637 (ALT 250 FOR IP): Performed by: INTERNAL MEDICINE

## 2020-12-14 PROCEDURE — 6360000002 HC RX W HCPCS: Performed by: NURSE PRACTITIONER

## 2020-12-14 PROCEDURE — 2580000003 HC RX 258: Performed by: INTERNAL MEDICINE

## 2020-12-14 PROCEDURE — 94660 CPAP INITIATION&MGMT: CPT

## 2020-12-14 PROCEDURE — 2700000000 HC OXYGEN THERAPY PER DAY

## 2020-12-14 PROCEDURE — 85730 THROMBOPLASTIN TIME PARTIAL: CPT

## 2020-12-14 PROCEDURE — 6360000002 HC RX W HCPCS: Performed by: INTERNAL MEDICINE

## 2020-12-14 PROCEDURE — 36415 COLL VENOUS BLD VENIPUNCTURE: CPT

## 2020-12-14 PROCEDURE — 80053 COMPREHEN METABOLIC PANEL: CPT

## 2020-12-14 PROCEDURE — 97530 THERAPEUTIC ACTIVITIES: CPT

## 2020-12-14 PROCEDURE — 2060000000 HC ICU INTERMEDIATE R&B

## 2020-12-14 PROCEDURE — 82962 GLUCOSE BLOOD TEST: CPT

## 2020-12-14 RX ADMIN — Medication 1000 MG: at 20:47

## 2020-12-14 RX ADMIN — Medication 1000 MG: at 08:54

## 2020-12-14 RX ADMIN — ALBUTEROL SULFATE 2 PUFF: 90 AEROSOL, METERED RESPIRATORY (INHALATION) at 08:55

## 2020-12-14 RX ADMIN — CARVEDILOL 6.25 MG: 6.25 TABLET, FILM COATED ORAL at 16:53

## 2020-12-14 RX ADMIN — ALBUTEROL SULFATE 2 PUFF: 90 AEROSOL, METERED RESPIRATORY (INHALATION) at 16:53

## 2020-12-14 RX ADMIN — INSULIN LISPRO 5 UNITS: 100 INJECTION, SOLUTION INTRAVENOUS; SUBCUTANEOUS at 17:23

## 2020-12-14 RX ADMIN — INSULIN LISPRO 6 UNITS: 100 INJECTION, SOLUTION INTRAVENOUS; SUBCUTANEOUS at 12:01

## 2020-12-14 RX ADMIN — INSULIN GLARGINE 30 UNITS: 100 INJECTION, SOLUTION SUBCUTANEOUS at 20:47

## 2020-12-14 RX ADMIN — CLOPIDOGREL BISULFATE 75 MG: 75 TABLET ORAL at 08:54

## 2020-12-14 RX ADMIN — ALBUTEROL SULFATE 2 PUFF: 90 AEROSOL, METERED RESPIRATORY (INHALATION) at 20:46

## 2020-12-14 RX ADMIN — INSULIN LISPRO 3 UNITS: 100 INJECTION, SOLUTION INTRAVENOUS; SUBCUTANEOUS at 20:47

## 2020-12-14 RX ADMIN — ALBUTEROL SULFATE 2 PUFF: 90 AEROSOL, METERED RESPIRATORY (INHALATION) at 11:48

## 2020-12-14 RX ADMIN — ENOXAPARIN SODIUM 40 MG: 40 INJECTION SUBCUTANEOUS at 08:54

## 2020-12-14 RX ADMIN — LEVOTHYROXINE SODIUM 150 MCG: 75 TABLET ORAL at 06:09

## 2020-12-14 RX ADMIN — BUDESONIDE AND FORMOTEROL FUMARATE DIHYDRATE 2 PUFF: 160; 4.5 AEROSOL RESPIRATORY (INHALATION) at 20:47

## 2020-12-14 RX ADMIN — INSULIN LISPRO 6 UNITS: 100 INJECTION, SOLUTION INTRAVENOUS; SUBCUTANEOUS at 17:24

## 2020-12-14 RX ADMIN — ASPIRIN 325 MG: 325 TABLET, COATED ORAL at 08:54

## 2020-12-14 RX ADMIN — CARVEDILOL 6.25 MG: 6.25 TABLET, FILM COATED ORAL at 08:54

## 2020-12-14 RX ADMIN — SERTRALINE 75 MG: 50 TABLET, FILM COATED ORAL at 08:54

## 2020-12-14 RX ADMIN — BUDESONIDE AND FORMOTEROL FUMARATE DIHYDRATE 2 PUFF: 160; 4.5 AEROSOL RESPIRATORY (INHALATION) at 08:55

## 2020-12-14 RX ADMIN — Medication 10 ML: at 08:54

## 2020-12-14 RX ADMIN — ENOXAPARIN SODIUM 40 MG: 40 INJECTION SUBCUTANEOUS at 20:47

## 2020-12-14 RX ADMIN — Medication 10 ML: at 20:46

## 2020-12-14 RX ADMIN — ZINC SULFATE 220 MG (50 MG) CAPSULE 50 MG: CAPSULE at 08:54

## 2020-12-14 RX ADMIN — MONTELUKAST SODIUM 10 MG: 10 TABLET, FILM COATED ORAL at 20:47

## 2020-12-14 RX ADMIN — DEXAMETHASONE SODIUM PHOSPHATE 6 MG: 4 INJECTION, SOLUTION INTRA-ARTICULAR; INTRALESIONAL; INTRAMUSCULAR; INTRAVENOUS; SOFT TISSUE at 11:48

## 2020-12-14 RX ADMIN — DEXAMETHASONE SODIUM PHOSPHATE 6 MG: 4 INJECTION, SOLUTION INTRA-ARTICULAR; INTRALESIONAL; INTRAMUSCULAR; INTRAVENOUS; SOFT TISSUE at 23:15

## 2020-12-14 RX ADMIN — PANTOPRAZOLE SODIUM 40 MG: 40 TABLET, DELAYED RELEASE ORAL at 06:09

## 2020-12-14 RX ADMIN — ISOSORBIDE MONONITRATE 60 MG: 60 TABLET, EXTENDED RELEASE ORAL at 08:54

## 2020-12-14 RX ADMIN — MICONAZOLE NITRATE: 20 POWDER TOPICAL at 20:47

## 2020-12-14 RX ADMIN — MICONAZOLE NITRATE: 20 POWDER TOPICAL at 08:56

## 2020-12-14 RX ADMIN — INSULIN LISPRO 8 UNITS: 100 INJECTION, SOLUTION INTRAVENOUS; SUBCUTANEOUS at 08:54

## 2020-12-14 RX ADMIN — ATORVASTATIN CALCIUM 20 MG: 20 TABLET, FILM COATED ORAL at 20:47

## 2020-12-14 ASSESSMENT — PAIN SCALES - GENERAL: PAINLEVEL_OUTOF10: 0

## 2020-12-14 ASSESSMENT — ENCOUNTER SYMPTOMS
SHORTNESS OF BREATH: 0
DIARRHEA: 0
NAUSEA: 0
VOMITING: 0
COUGH: 0

## 2020-12-14 NOTE — PROGRESS NOTES
Deb Regalado M.D.,[facility]  Madhu York D.O., F.A.C.O.I., Moy Johnson M.D. Susy Daly M.D., Andrews Amezquita M.D. Elodia Estrella D.O. Daily Pulmonary Progress Note    Patient:  Alison Almanza 65 y.o. male MRN: [id number]     Date of Service: 12/14/2020      Synopsis     We are following patient for COVID-19 pneumonia, acute hypoxic respiratory failure    \"CC\" shortness of breath    Code status: [facility]      Subjective      Patient was seen through window interview conducted with staff and effort to preserve PPE and limit exposure to COVID-19. Lying in bed in no apparent distress. He has only an occasional nonproductive cough. He is currently on 2 L oxygen. He did wear bilevel overnight. Review of Systems:  Constitutional: Denies fever, weight loss, night sweats, and fatigue  Skin: Denies pigmentation, dark lesions, and rashes   HEENT: Denies hearing loss, tinnitus, ear drainage, epistaxis, sore throat, and hoarseness. Cardiovascular: Denies palpitations, chest pain, and chest pressure. Respiratory: Denies cough, dyspnea at rest, hemoptysis, apnea, and choking.   Gastrointestinal: Denies nausea, vomiting, poor appetite, diarrhea, heartburn or reflux  Genitourinary: Denies dysuria, frequency, urgency or hematuria  Musculoskeletal: Denies myalgias, muscle weakness, and bone pain  Neurological: Denies dizziness, vertigo, headache, and focal weakness  Psychological: Denies anxiety and depression  Endocrine: Denies heat intolerance and cold intolerance  Hematopoietic/Lymphatic: Denies bleeding problems and blood transfusions    24-hour events:  none    Objective   Vitals: /63   Pulse 56   Temp 98 °F (36.7 °C) (Axillary)   Resp 18   Ht 5' 7\" (1.702 m)   Wt 257 lb (116.6 kg)   SpO2 93%   BMI 40.25 kg/m²     I/O:    Intake/Output Summary (Last 24 hours) at 12/14/2020 1552  Last data filed at 12/14/2020 1312  Gross per 24 hour   Intake --   Output 450 ml   Net -450 ml Vent Information  Skin Assessment: Clean, dry, & intact  Vt Ordered: 40 mL  FiO2 : 40 %  SpO2: 93 %  SpO2/FiO2 ratio: 184  I Time/ I Time %: 0.95 s  Mask Type: Full face mask  Mask Size: Medium       IPAP: 14 cmH20  CPAP/EPAP: 6 cmH2O     CURRENT MEDS :  Scheduled Meds:   insulin lispro  5 Units Subcutaneous Once    dexamethasone  6 mg Intravenous Q12H    insulin glargine  30 Units Subcutaneous Nightly    insulin lispro  0-12 Units Subcutaneous TID WC    insulin lispro  0-6 Units Subcutaneous Nightly    sodium chloride flush  10 mL Intravenous 2 times per day    aspirin  325 mg Oral Daily    atorvastatin  20 mg Oral Daily    carvedilol  6.25 mg Oral BID WC    clopidogrel  75 mg Oral Daily    isosorbide mononitrate  60 mg Oral Daily    levothyroxine  150 mcg Oral Daily    montelukast  10 mg Oral Nightly    pantoprazole  40 mg Oral QAM AC    sertraline  75 mg Oral Daily    enoxaparin  40 mg Subcutaneous BID    miconazole   Topical BID    budesonide-formoterol  2 puff Inhalation BID    albuterol sulfate HFA  2 puff Inhalation 4x daily    vitamin C  1,000 mg Oral BID    zinc sulfate  50 mg Oral Daily       Physical Exam:  Due to the current efforts to prevent transmission of COVID-19 and also the need to preserve PPE for other caregivers, a face-to-face encounter with the patient was not performed. That being said, all relevant records and diagnostic tests were reviewed, including laboratory results and imaging. Please reference any relevant documentation elsewhere. Care will be coordinated with the primary service. Pertinent/ New Labs and Imaging Studies     Imaging Personally Reviewed:  12/12 cxr  Stable abnormal chest with diffuse infiltrates with marginal improvement   likely pneumonia.      ECHO  3/25/2015    Labs:  Lab Results   Component Value Date    WBC 12.0 12/14/2020    HGB 11.9 12/14/2020    HCT 37.1 12/14/2020    MCV 81.2 12/14/2020    MCH 26.0 12/14/2020    MCHC 32.1 12/14/2020    RDW 16.2 12/14/2020     12/14/2020    MPV 11.4 12/14/2020     Lab Results   Component Value Date     12/14/2020    K 5.2 12/14/2020     12/14/2020    CO2 21 12/14/2020    BUN 40 12/14/2020    CREATININE 1.0 12/14/2020    LABALBU 2.7 12/14/2020    LABALBU 3.8 12/21/2011    CALCIUM 8.3 12/14/2020    GFRAA >60 12/14/2020    LABGLOM >60 12/14/2020     Lab Results   Component Value Date    PROTIME 13.0 12/14/2020    PROTIME 12.2 12/18/2011    INR 1.1 12/14/2020     No results for input(s): PROBNP in the last 72 hours. No results for input(s): PROCAL in the last 72 hours. This SmartLink has not been configured with any valid records. Assessment:    1. Acute on chronic hypoxic respiratory failure  2. covid-19 pneumonia  3. Copd with acute exacerbation  4. JOSH  5. Possible superimposed bacterial pneumonia  6. Leukocytosis/lymphopenia  7. PAD   8. DEMARCUS non compliant with PAP  9. Nicotine dependence       Plan:   1. Wean oxygen  down to 2 L keep greater than 92%  2. Nocturnal and prn bipap 16/8 fio2 40%-refusing  3. Follow chest x-ray 12/12  4. IL-6 level 8.4  5. Decadron 6mg BID to complete 12/19  6. Remdesivir completed  7. Scheduled bronchodilators-Symbicort twice daily, Combivent as needed. incentive spirometer8. DVT prophylaxis  8. PT eval and treat, encourage increase in activity  Pulmonary to sign off please call again if needed  This plan of care was reviewed in collaboration with Dr. Wiggins Conception  Electronically signed by LEONA Ragland - CNP on 12/14/2020 at 3:54 PM    I personally saw, examined, and cared for the patient. Labs, medications, radiographs reviewed.  I agree with history exam and plans detailed in NP note with the following additions:    Oxygen down to RA to 2L  Completed remdesivir  Complete the steroid course  Bronchodilators  Pulmonary will sign off please call if needed    Electronically signed by Kassie Cardona MD on 12/14/2020 at 4:09 PM

## 2020-12-14 NOTE — PROGRESS NOTES
Physical Therapy  Facility/Department: [address] MED SURG/TELE  Daily Treatment Note  NAME: Soha Song  : 1950  MRN: [id number]    Date of Service: 2020    Patient Diagnosis(es): The primary encounter diagnosis was Acute respiratory failure with hypoxia ([facility] [address] A diagnosis of Pneumonia due to COVID-19 virus was also pertinent to this visit. has a past medical history of Arthritis, Asthma, Bilateral carotid artery stenosis, CAD (coronary artery disease), COPD (chronic obstructive pulmonary disease) ([facility] [address] Diabetes mellitus ([facility] [address] Marcus's gangrene, Hyperlipidemia, Hypothyroidism, DEMARCUS (obstructive sleep apnea), Pneumonia, and Stroke ([facility] [address] has a past surgical history that includes Cardiac surgery; Coronary angioplasty with stent (2011); knee surgery; other surgical history (Left, 2015); other surgical history (3/2/15); other surgical history (Left, 3/2/2015); and eye surgery (Right, 2015).    Referring Provider: LEONA Romo CNP          Evaluating Therapist: Chayo Roy PT        Room #:544  DIAGNOSIS: Acute respiratory failure with hypoxia  PRECAUTIONS: falls, O2, droplet plus isolaiton     Social:  Pt lives admitted from [place] [address]. States he walks with a cane. Uses O2 at 3L       Initial Evaluation  Date: 20 Treatment  20    Short Term/ Long Term   Goals   Was pt agreeable to Eval/treatment? yes  with much encouragement     Does pt have pain? no No c/o pain      Bed Mobility  Rolling: mod asssit  Supine to sit: mod asssit  Sit to supine: mod assist  Scooting: mod assist  supine to sit mod assist  Sit to supine mod assist  Scooting mod assist Min assist   Transfers Sit to stand: min assist  Stand to sit: min assist  Stand pivot: NT   NT, pt refused.   CGA   Ambulation    3 feet with ww sidestepping along bed min assist  NT 25 feet with ww with CGA   Stair Negotiation  Ascended and descended  NT    N/A   LE strength     3+/5     4-/5   balance      fair     AM-PAC Raw score               14/24 14/24            Patient education  Pt was educated on importance of mobility    Patient response to education:   Pt verbalized understanding Pt demonstrated skill Pt requires further education in this area   no   yes     Additional Comments/treatment; Pt initially refusing PT session. Easily agitated. Did agree to sit up edge of bed to reposition. Explained to pt importance of mobility and spending time out of bed. Refused to stand or transfer to chair. Pt was left in bed with call light left by patient. Time in: 1040  Time out: 1052    Total treatment time 12 mintues    Pt is making limited progress toward established Physical Therapy goals. Continue with physical therapy current plan of care.     Jessie PT [id number]      CPT codes:  [] Low Complexity PT evaluation 24871  [] Moderate Complexity PT evaluation 21721  [] High Complexity PT evaluation 51880  [] PT Re-evaluation 15740  [] Gait training 03473  minutes  [] Manual therapy 36402    minutes  [x] Therapeutic activities 96962  12  minutes  [] Therapeutic exercises 29845     minutes  [] Neuromuscular reeducation 39264     minutes

## 2020-12-14 NOTE — PROGRESS NOTES
Progress Note  Date:2020       [id number]  Patient Name:Jonathan Hernandez     YOB: 1950     Age:70 y.o. Patient seen for acute hypoxic respiratory failure secondary to covid-19. Patient this am denies complaints. 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 He denies any fevers, chills, chest pains, shortness of breath, nausea, vomiting or diarrhea. Subjective    Subjective:  Symptoms:  No shortness of breath, cough, chest pain, headache, chest pressure or diarrhea. Diet:  No nausea or vomiting. Review of Systems   Respiratory: Negative for cough and shortness of breath. Cardiovascular: Negative for chest pain. Gastrointestinal: Negative for diarrhea, nausea and vomiting. Objective         Vitals Last 24 Hours:  TEMPERATURE:  Temp  Av.7 °F (36.5 °C)  Min: 97.1 °F (36.2 °C)  Max: 98.1 °F (36.7 °C)  RESPIRATIONS RANGE: Resp  Av.3  Min: 16  Max: 22  PULSE OXIMETRY RANGE: SpO2  Av.3 %  Min: 93 %  Max: 98 %  PULSE RANGE: Pulse  Av.6  Min: 48  Max: 55  BLOOD PRESSURE RANGE: Systolic (36ISD), MOX:116 , Min:116 , AGY:623   ; Diastolic (22INC), AEC:04, Min:55, Max:62    I/O (24Hr): Intake/Output Summary (Last 24 hours) at 2020 0645  Last data filed at 2020 1735  Gross per 24 hour   Intake --   Output 700 ml   Net -700 ml     Objective:  General Appearance:  Comfortable, well-appearing and in no acute distress. Vital signs: (most recent): Blood pressure 137/63, pulse 56, temperature 98 °F (36.7 °C), temperature source Axillary, resp. rate 18, height 5' 7\" (1.702 m), weight 257 lb (116.6 kg), SpO2 93 %. Lungs:  Normal effort and normal respiratory rate. There are decreased breath sounds. No rales or rhonchi. Heart: Bradycardia. Regular rhythm. S1 normal and S2 normal.  No friction rub. Abdomen: Abdomen is soft and non-distended. Bowel sounds are normal.   There is no abdominal tenderness. There is no rebound tenderness.   There is no guarding. Extremities: Normal range of motion. There is no dependent edema. Skin:  Warm and dry. Labs/Imaging/Diagnostics    Labs:  CBC:  Recent Labs     12/11/20  1530 12/12/20  1045 12/13/20  0305   WBC 10.7 10.6 12.1*   RBC 4.59 4.77 4.72   HGB 12.0* 12.2* 12.3*   HCT 37.8 39.7 38.6   MCV 82.4 83.2 81.8   RDW 16.2* 16.1* 16.0*    334 322     CHEMISTRIES:  Recent Labs     12/11/20  1530 12/12/20  1045 12/13/20  0305    135 137   K 5.2* 5.0 5.1*    107 107   CO2 22 20* 21*   BUN 71* 59* 54*   CREATININE 1.3* 1.2 1.2   GLUCOSE 300* 275* 224*     PT/INR:  Recent Labs     12/11/20  1530 12/12/20  1045 12/13/20  0305   PROTIME 13.3* 13.3* 14.0*   INR 1.2 1.2 1.2     APTT:  Recent Labs     12/11/20  1530 12/12/20  1045 12/13/20  0305   APTT 34.3 33.6 36.1*     LIVER PROFILE:  Recent Labs     12/11/20  1530 12/12/20  1045 12/13/20  0305   AST 51* 40* 29   ALT 71* 81* 74*   BILITOT 0.6 0.8 0.7   ALKPHOS 109 104 100       Imaging Last 24 Hours:  Xr Chest Portable    Result Date: 12/12/2020  EXAMINATION: ONE XRAY VIEW OF THE CHEST 12/12/2020 12:11 pm COMPARISON: December 6, 2020 HISTORY: ORDERING SYSTEM PROVIDED HISTORY: pna TECHNOLOGIST PROVIDED HISTORY: Reason for exam:->pna FINDINGS: There is cardiomegaly. There is mild vascular congestion. There is persistent multifocal diffuse ground-glass infiltrates with marginal improvement especially in the left side. Stable abnormal chest with diffuse infiltrates with marginal improvement likely pneumonia. Assessment//Plan           Hospital Problems           Last Modified POA    Acute respiratory failure with hypoxia ([address] 12/7/2020 Yes        Assessment & Plan    1. Acute on Chronic Hypoxic Respiratory Failure   2. COVID-19 Pneumonia   3. JOSH   4. Elevated LFTs   5. Diabetes Mellitus Type II   6. Acute Exacerbation of COPD   7. CAD  8. HTN   9. Fungal dermatitis  Completed remdesivir therapy. Continue on dexamethasone.  Attempt Physical therapy again today. Continue to wean oxygen. Possibly home in next 24-48 hours depending on progress with PT and oxygen requirements.      Mehreen Nation DO      Electronically signed by Mehreen Nation DO on 12/14/20 at 6:45 AM EST

## 2020-12-15 LAB
ALBUMIN SERPL-MCNC: 2.6 G/DL (ref 3.5–5.2)
ALP BLD-CCNC: 92 U/L (ref 40–129)
ALT SERPL-CCNC: 75 U/L (ref 0–40)
ANION GAP SERPL CALCULATED.3IONS-SCNC: 9 MMOL/L (ref 7–16)
APTT: 35.5 SEC (ref 24.5–35.1)
AST SERPL-CCNC: 29 U/L (ref 0–39)
BASOPHILS ABSOLUTE: 0.01 E9/L (ref 0–0.2)
BASOPHILS RELATIVE PERCENT: 0.1 % (ref 0–2)
BILIRUB SERPL-MCNC: 0.6 MG/DL (ref 0–1.2)
BUN BLDV-MCNC: 41 MG/DL (ref 8–23)
CALCIUM SERPL-MCNC: 8.3 MG/DL (ref 8.6–10.2)
CHLORIDE BLD-SCNC: 105 MMOL/L (ref 98–107)
CO2: 20 MMOL/L (ref 22–29)
CREAT SERPL-MCNC: 1 MG/DL (ref 0.7–1.2)
EOSINOPHILS ABSOLUTE: 0.01 E9/L (ref 0.05–0.5)
EOSINOPHILS RELATIVE PERCENT: 0.1 % (ref 0–6)
GFR AFRICAN AMERICAN: >60
GFR NON-AFRICAN AMERICAN: >60 ML/MIN/1.73
GLUCOSE BLD-MCNC: 230 MG/DL (ref 74–99)
HCT VFR BLD CALC: 35.6 % (ref 37–54)
HEMOGLOBIN: 11.5 G/DL (ref 12.5–16.5)
IMMATURE GRANULOCYTES #: 0.23 E9/L
IMMATURE GRANULOCYTES %: 2 % (ref 0–5)
INR BLD: 1
LYMPHOCYTES ABSOLUTE: 0.7 E9/L (ref 1.5–4)
LYMPHOCYTES RELATIVE PERCENT: 6 % (ref 20–42)
MCH RBC QN AUTO: 26.2 PG (ref 26–35)
MCHC RBC AUTO-ENTMCNC: 32.3 % (ref 32–34.5)
MCV RBC AUTO: 81.1 FL (ref 80–99.9)
METER GLUCOSE: 214 MG/DL (ref 74–99)
METER GLUCOSE: 265 MG/DL (ref 74–99)
METER GLUCOSE: 297 MG/DL (ref 74–99)
METER GLUCOSE: 306 MG/DL (ref 74–99)
MONOCYTES ABSOLUTE: 0.43 E9/L (ref 0.1–0.95)
MONOCYTES RELATIVE PERCENT: 3.7 % (ref 2–12)
NEUTROPHILS ABSOLUTE: 10.36 E9/L (ref 1.8–7.3)
NEUTROPHILS RELATIVE PERCENT: 88.1 % (ref 43–80)
PDW BLD-RTO: 16.3 FL (ref 11.5–15)
PLATELET # BLD: 333 E9/L (ref 130–450)
PMV BLD AUTO: 11.4 FL (ref 7–12)
POTASSIUM REFLEX MAGNESIUM: 5.2 MMOL/L (ref 3.5–5)
PROTHROMBIN TIME: 12.2 SEC (ref 9.3–12.4)
RBC # BLD: 4.39 E12/L (ref 3.8–5.8)
SODIUM BLD-SCNC: 134 MMOL/L (ref 132–146)
TOTAL PROTEIN: 5.6 G/DL (ref 6.4–8.3)
WBC # BLD: 11.7 E9/L (ref 4.5–11.5)

## 2020-12-15 PROCEDURE — 85610 PROTHROMBIN TIME: CPT

## 2020-12-15 PROCEDURE — 80053 COMPREHEN METABOLIC PANEL: CPT

## 2020-12-15 PROCEDURE — 82962 GLUCOSE BLOOD TEST: CPT

## 2020-12-15 PROCEDURE — 36415 COLL VENOUS BLD VENIPUNCTURE: CPT

## 2020-12-15 PROCEDURE — 6370000000 HC RX 637 (ALT 250 FOR IP): Performed by: INTERNAL MEDICINE

## 2020-12-15 PROCEDURE — 2700000000 HC OXYGEN THERAPY PER DAY

## 2020-12-15 PROCEDURE — 2580000003 HC RX 258: Performed by: INTERNAL MEDICINE

## 2020-12-15 PROCEDURE — 94660 CPAP INITIATION&MGMT: CPT

## 2020-12-15 PROCEDURE — 85730 THROMBOPLASTIN TIME PARTIAL: CPT

## 2020-12-15 PROCEDURE — 6360000002 HC RX W HCPCS: Performed by: INTERNAL MEDICINE

## 2020-12-15 PROCEDURE — 85025 COMPLETE CBC W/AUTO DIFF WBC: CPT

## 2020-12-15 PROCEDURE — 2060000000 HC ICU INTERMEDIATE R&B

## 2020-12-15 PROCEDURE — 6360000002 HC RX W HCPCS: Performed by: NURSE PRACTITIONER

## 2020-12-15 RX ORDER — BUDESONIDE AND FORMOTEROL FUMARATE DIHYDRATE 160; 4.5 UG/1; UG/1
2 AEROSOL RESPIRATORY (INHALATION) 2 TIMES DAILY
Qty: 1 INHALER | Refills: 3 | DISCHARGE
Start: 2020-12-15

## 2020-12-15 RX ORDER — PANTOPRAZOLE SODIUM 40 MG/1
40 TABLET, DELAYED RELEASE ORAL
Qty: 30 TABLET | Refills: 0 | DISCHARGE
Start: 2020-12-16 | End: 2021-08-10

## 2020-12-15 RX ADMIN — LEVOTHYROXINE SODIUM 150 MCG: 75 TABLET ORAL at 06:02

## 2020-12-15 RX ADMIN — INSULIN GLARGINE 30 UNITS: 100 INJECTION, SOLUTION SUBCUTANEOUS at 21:55

## 2020-12-15 RX ADMIN — ENOXAPARIN SODIUM 40 MG: 40 INJECTION SUBCUTANEOUS at 21:55

## 2020-12-15 RX ADMIN — ISOSORBIDE MONONITRATE 60 MG: 60 TABLET, EXTENDED RELEASE ORAL at 08:11

## 2020-12-15 RX ADMIN — ALBUTEROL SULFATE 2 PUFF: 90 AEROSOL, METERED RESPIRATORY (INHALATION) at 08:12

## 2020-12-15 RX ADMIN — MONTELUKAST SODIUM 10 MG: 10 TABLET, FILM COATED ORAL at 21:55

## 2020-12-15 RX ADMIN — ALBUTEROL SULFATE 2 PUFF: 90 AEROSOL, METERED RESPIRATORY (INHALATION) at 11:54

## 2020-12-15 RX ADMIN — MICONAZOLE NITRATE: 20 POWDER TOPICAL at 22:02

## 2020-12-15 RX ADMIN — CLOPIDOGREL BISULFATE 75 MG: 75 TABLET ORAL at 08:11

## 2020-12-15 RX ADMIN — Medication 10 ML: at 06:20

## 2020-12-15 RX ADMIN — INSULIN LISPRO 4 UNITS: 100 INJECTION, SOLUTION INTRAVENOUS; SUBCUTANEOUS at 08:33

## 2020-12-15 RX ADMIN — Medication 10 ML: at 21:56

## 2020-12-15 RX ADMIN — INSULIN LISPRO 3 UNITS: 100 INJECTION, SOLUTION INTRAVENOUS; SUBCUTANEOUS at 21:55

## 2020-12-15 RX ADMIN — SERTRALINE 75 MG: 50 TABLET, FILM COATED ORAL at 08:11

## 2020-12-15 RX ADMIN — Medication 1000 MG: at 08:10

## 2020-12-15 RX ADMIN — ENOXAPARIN SODIUM 40 MG: 40 INJECTION SUBCUTANEOUS at 08:10

## 2020-12-15 RX ADMIN — ALBUTEROL SULFATE 2 PUFF: 90 AEROSOL, METERED RESPIRATORY (INHALATION) at 22:00

## 2020-12-15 RX ADMIN — BUDESONIDE AND FORMOTEROL FUMARATE DIHYDRATE 2 PUFF: 160; 4.5 AEROSOL RESPIRATORY (INHALATION) at 08:12

## 2020-12-15 RX ADMIN — PANTOPRAZOLE SODIUM 40 MG: 40 TABLET, DELAYED RELEASE ORAL at 06:02

## 2020-12-15 RX ADMIN — INSULIN LISPRO 6 UNITS: 100 INJECTION, SOLUTION INTRAVENOUS; SUBCUTANEOUS at 12:00

## 2020-12-15 RX ADMIN — Medication 1000 MG: at 21:55

## 2020-12-15 RX ADMIN — ATORVASTATIN CALCIUM 20 MG: 20 TABLET, FILM COATED ORAL at 21:55

## 2020-12-15 RX ADMIN — ASPIRIN 325 MG: 325 TABLET, COATED ORAL at 08:10

## 2020-12-15 RX ADMIN — DEXAMETHASONE SODIUM PHOSPHATE 6 MG: 4 INJECTION, SOLUTION INTRA-ARTICULAR; INTRALESIONAL; INTRAMUSCULAR; INTRAVENOUS; SOFT TISSUE at 21:55

## 2020-12-15 RX ADMIN — ALBUTEROL SULFATE 2 PUFF: 90 AEROSOL, METERED RESPIRATORY (INHALATION) at 16:48

## 2020-12-15 RX ADMIN — MICONAZOLE NITRATE: 20 POWDER TOPICAL at 06:17

## 2020-12-15 RX ADMIN — INSULIN LISPRO 8 UNITS: 100 INJECTION, SOLUTION INTRAVENOUS; SUBCUTANEOUS at 18:09

## 2020-12-15 RX ADMIN — BUDESONIDE AND FORMOTEROL FUMARATE DIHYDRATE 2 PUFF: 160; 4.5 AEROSOL RESPIRATORY (INHALATION) at 22:01

## 2020-12-15 RX ADMIN — ZINC SULFATE 220 MG (50 MG) CAPSULE 50 MG: CAPSULE at 08:11

## 2020-12-15 RX ADMIN — DEXAMETHASONE SODIUM PHOSPHATE 6 MG: 4 INJECTION, SOLUTION INTRA-ARTICULAR; INTRALESIONAL; INTRAMUSCULAR; INTRAVENOUS; SOFT TISSUE at 11:50

## 2020-12-15 ASSESSMENT — PAIN SCALES - GENERAL
PAINLEVEL_OUTOF10: 0
PAINLEVEL_OUTOF10: 0

## 2020-12-15 NOTE — PROGRESS NOTES
Date: 12/14/2020    Time: 8:25 PM    Patient Placed On BIPAP/CPAP/ Non-Invasive Ventilation? Yes    If no must comment. Facial area red/color change? No           If YES are Blister/Lesion present? No   If yes must notify nursing staff  BIPAP/CPAP skin barrier?   Yes    Skin barrier type:mepilexlite       Comments:        Brianna Aparicio

## 2020-12-16 VITALS
BODY MASS INDEX: 40.34 KG/M2 | SYSTOLIC BLOOD PRESSURE: 134 MMHG | HEART RATE: 43 BPM | DIASTOLIC BLOOD PRESSURE: 65 MMHG | HEIGHT: 67 IN | OXYGEN SATURATION: 93 % | RESPIRATION RATE: 16 BRPM | TEMPERATURE: 96.1 F | WEIGHT: 257 LBS

## 2020-12-16 LAB
ALBUMIN SERPL-MCNC: 2.6 G/DL (ref 3.5–5.2)
ALP BLD-CCNC: 91 U/L (ref 40–129)
ALT SERPL-CCNC: 64 U/L (ref 0–40)
ANION GAP SERPL CALCULATED.3IONS-SCNC: 7 MMOL/L (ref 7–16)
APTT: 33.6 SEC (ref 24.5–35.1)
AST SERPL-CCNC: 19 U/L (ref 0–39)
BASOPHILS ABSOLUTE: 0.01 E9/L (ref 0–0.2)
BASOPHILS RELATIVE PERCENT: 0.1 % (ref 0–2)
BILIRUB SERPL-MCNC: 0.8 MG/DL (ref 0–1.2)
BUN BLDV-MCNC: 36 MG/DL (ref 8–23)
CALCIUM SERPL-MCNC: 8.4 MG/DL (ref 8.6–10.2)
CHLORIDE BLD-SCNC: 103 MMOL/L (ref 98–107)
CO2: 20 MMOL/L (ref 22–29)
CREAT SERPL-MCNC: 1 MG/DL (ref 0.7–1.2)
EKG ATRIAL RATE: 50 BPM
EKG P AXIS: 65 DEGREES
EKG P-R INTERVAL: 188 MS
EKG Q-T INTERVAL: 482 MS
EKG QRS DURATION: 142 MS
EKG QTC CALCULATION (BAZETT): 439 MS
EKG R AXIS: -22 DEGREES
EKG T AXIS: -9 DEGREES
EKG VENTRICULAR RATE: 50 BPM
EOSINOPHILS ABSOLUTE: 0.01 E9/L (ref 0.05–0.5)
EOSINOPHILS RELATIVE PERCENT: 0.1 % (ref 0–6)
GFR AFRICAN AMERICAN: >60
GFR NON-AFRICAN AMERICAN: >60 ML/MIN/1.73
GLUCOSE BLD-MCNC: 235 MG/DL (ref 74–99)
HCT VFR BLD CALC: 36.6 % (ref 37–54)
HEMOGLOBIN: 11.7 G/DL (ref 12.5–16.5)
IMMATURE GRANULOCYTES #: 0.22 E9/L
IMMATURE GRANULOCYTES %: 1.9 % (ref 0–5)
INR BLD: 1
LYMPHOCYTES ABSOLUTE: 0.77 E9/L (ref 1.5–4)
LYMPHOCYTES RELATIVE PERCENT: 6.5 % (ref 20–42)
MCH RBC QN AUTO: 26.1 PG (ref 26–35)
MCHC RBC AUTO-ENTMCNC: 32 % (ref 32–34.5)
MCV RBC AUTO: 81.7 FL (ref 80–99.9)
METER GLUCOSE: 200 MG/DL (ref 74–99)
METER GLUCOSE: 235 MG/DL (ref 74–99)
METER GLUCOSE: 287 MG/DL (ref 74–99)
MONOCYTES ABSOLUTE: 0.41 E9/L (ref 0.1–0.95)
MONOCYTES RELATIVE PERCENT: 3.5 % (ref 2–12)
NEUTROPHILS ABSOLUTE: 10.45 E9/L (ref 1.8–7.3)
NEUTROPHILS RELATIVE PERCENT: 87.9 % (ref 43–80)
PDW BLD-RTO: 16.4 FL (ref 11.5–15)
PLATELET # BLD: 333 E9/L (ref 130–450)
PMV BLD AUTO: 10.9 FL (ref 7–12)
POTASSIUM REFLEX MAGNESIUM: 5.3 MMOL/L (ref 3.5–5)
PROTHROMBIN TIME: 12.4 SEC (ref 9.3–12.4)
RBC # BLD: 4.48 E12/L (ref 3.8–5.8)
REASON FOR REJECTION: NORMAL
REJECTED TEST: NORMAL
SODIUM BLD-SCNC: 130 MMOL/L (ref 132–146)
TOTAL PROTEIN: 5.7 G/DL (ref 6.4–8.3)
WBC # BLD: 11.9 E9/L (ref 4.5–11.5)

## 2020-12-16 PROCEDURE — 6360000002 HC RX W HCPCS: Performed by: INTERNAL MEDICINE

## 2020-12-16 PROCEDURE — 2700000000 HC OXYGEN THERAPY PER DAY

## 2020-12-16 PROCEDURE — 85025 COMPLETE CBC W/AUTO DIFF WBC: CPT

## 2020-12-16 PROCEDURE — 2580000003 HC RX 258: Performed by: INTERNAL MEDICINE

## 2020-12-16 PROCEDURE — 82962 GLUCOSE BLOOD TEST: CPT

## 2020-12-16 PROCEDURE — 85730 THROMBOPLASTIN TIME PARTIAL: CPT

## 2020-12-16 PROCEDURE — 6370000000 HC RX 637 (ALT 250 FOR IP): Performed by: INTERNAL MEDICINE

## 2020-12-16 PROCEDURE — 6360000002 HC RX W HCPCS: Performed by: NURSE PRACTITIONER

## 2020-12-16 PROCEDURE — 93005 ELECTROCARDIOGRAM TRACING: CPT | Performed by: INTERNAL MEDICINE

## 2020-12-16 PROCEDURE — 36415 COLL VENOUS BLD VENIPUNCTURE: CPT

## 2020-12-16 PROCEDURE — 94660 CPAP INITIATION&MGMT: CPT

## 2020-12-16 PROCEDURE — 80053 COMPREHEN METABOLIC PANEL: CPT

## 2020-12-16 PROCEDURE — 85610 PROTHROMBIN TIME: CPT

## 2020-12-16 RX ADMIN — MICONAZOLE NITRATE: 20 POWDER TOPICAL at 08:12

## 2020-12-16 RX ADMIN — DEXAMETHASONE SODIUM PHOSPHATE 6 MG: 4 INJECTION, SOLUTION INTRA-ARTICULAR; INTRALESIONAL; INTRAMUSCULAR; INTRAVENOUS; SOFT TISSUE at 11:51

## 2020-12-16 RX ADMIN — ISOSORBIDE MONONITRATE 60 MG: 60 TABLET, EXTENDED RELEASE ORAL at 08:09

## 2020-12-16 RX ADMIN — Medication 10 ML: at 11:51

## 2020-12-16 RX ADMIN — ASPIRIN 325 MG: 325 TABLET, COATED ORAL at 08:09

## 2020-12-16 RX ADMIN — ALBUTEROL SULFATE 2 PUFF: 90 AEROSOL, METERED RESPIRATORY (INHALATION) at 11:51

## 2020-12-16 RX ADMIN — PANTOPRAZOLE SODIUM 40 MG: 40 TABLET, DELAYED RELEASE ORAL at 05:17

## 2020-12-16 RX ADMIN — SERTRALINE 75 MG: 50 TABLET, FILM COATED ORAL at 08:10

## 2020-12-16 RX ADMIN — INSULIN LISPRO 4 UNITS: 100 INJECTION, SOLUTION INTRAVENOUS; SUBCUTANEOUS at 12:00

## 2020-12-16 RX ADMIN — INSULIN LISPRO 4 UNITS: 100 INJECTION, SOLUTION INTRAVENOUS; SUBCUTANEOUS at 08:30

## 2020-12-16 RX ADMIN — CLOPIDOGREL BISULFATE 75 MG: 75 TABLET ORAL at 08:10

## 2020-12-16 RX ADMIN — INSULIN LISPRO 6 UNITS: 100 INJECTION, SOLUTION INTRAVENOUS; SUBCUTANEOUS at 16:51

## 2020-12-16 RX ADMIN — ALBUTEROL SULFATE 2 PUFF: 90 AEROSOL, METERED RESPIRATORY (INHALATION) at 08:10

## 2020-12-16 RX ADMIN — Medication 1000 MG: at 08:09

## 2020-12-16 RX ADMIN — BUDESONIDE AND FORMOTEROL FUMARATE DIHYDRATE 2 PUFF: 160; 4.5 AEROSOL RESPIRATORY (INHALATION) at 08:10

## 2020-12-16 RX ADMIN — ENOXAPARIN SODIUM 40 MG: 40 INJECTION SUBCUTANEOUS at 08:10

## 2020-12-16 RX ADMIN — ZINC SULFATE 220 MG (50 MG) CAPSULE 50 MG: CAPSULE at 08:09

## 2020-12-16 RX ADMIN — LEVOTHYROXINE SODIUM 150 MCG: 75 TABLET ORAL at 05:17

## 2020-12-16 ASSESSMENT — PAIN SCALES - GENERAL: PAINLEVEL_OUTOF10: 0

## 2020-12-16 NOTE — PROGRESS NOTES
Nurse to nurse given to Sunil Harvey at [facility], no questions at this time. Patient will be leaving unit at 4pm today.

## 2020-12-16 NOTE — CONSULTS
[facility] [address] [place] [address]    Name: Gissel Sam    Age: 65 y.o. Date of Admission: 12/6/2020 10:22 PM    Date of Service: 12/16/2020    Reason for Consultation: sinus bradycardia    Referring Physician: Dr Rufino Mueller  Primary Care Physician: Jerald Maxwell DO    History of Present Illness: The patient is a 65y.o. year old male not seen by [facility] since 2014. Admitted 12/6 2020 for COVID from [facility]. Heart rate was initially tachy, but over the past several days rates have been 40-50's, has dipped to upper 30's, all sinus rhythm. No pauses. Has been off coreg several days. Feels fine , no chest pain dizziness, lightheadedness, syncope, SOB. Has not been ambulating much, at [facility] states get around with cane. H/O: CAD- PTCA 2002, cath 12/2011- FREDA RCA, LAD, PTCA diag. H/o PACs, echo/DIONNA 2014 normal EF., DM, hypothyroid, DEMARCUS- hasn't used bipap for years, COPD-states still smokes, HTN,        Past Medical History:   Past Medical History:   Diagnosis Date    Arthritis     Asthma     Bilateral carotid artery stenosis 10/1/2020    CAD (coronary artery disease) 2001    FREDA to RCA    COPD (chronic obstructive pulmonary disease) ([address]     Diabetes mellitus ([address]     Marcus's gangrene     Hyperlipidemia     Hypothyroidism     DEMARCUS (obstructive sleep apnea)     no cpap or bipap     Pneumonia 2/5/2015    Stroke ([facility]        Review of Systems:   Constitutional: No fever, chills, sweats  Cardiac: As per HPI  Pulmonary: No cough, wheeze, hemoptysis  HEENT: No visual disturbances, difficult swallowing  GI: No nausea, vomiting, diarrhea, abdominal pain, rectal bleeding  : No dysuria or hematuria  Endocrine: No excessive thirst, heat or cold intolerance. Musculoskeletal: No joint pain or muscle aches.  No claudication  Skin: No skin breakdown or rashes  Neuro: No headache, confusion, or seizures  Psych: No depression, anxiety    Family History:  Family History   Problem Relation Age of Onset    Cancer Mother     Heart Disease Father        Social History:  Social History     Socioeconomic History    Marital status: Single     Spouse name: Not on file    Number of children: Not on file    Years of education: Not on file    Highest education level: Not on file   Occupational History    Not on file   Social Needs    Financial resource strain: Not on file    Food insecurity     Worry: Not on file     Inability: Not on file    Transportation needs     Medical: Not on file     Non-medical: Not on file   Tobacco Use    Smoking status: Current Every Day Smoker     Packs/day: 0.50     Years: 44.00     Pack years: 22.00     Types: Cigarettes    Smokeless tobacco: Never Used   Substance and Sexual Activity    Alcohol use: No     Alcohol/week: 0.0 standard drinks    Drug use: No    Sexual activity: Not Currently   Lifestyle    Physical activity     Days per week: Not on file     Minutes per session: Not on file    Stress: Not on file   Relationships    Social connections     Talks on phone: Not on file     Gets together: Not on file     Attends Baptism service: Not on file     Active member of club or organization: Not on file     Attends meetings of clubs or organizations: Not on file     Relationship status: Not on file    Intimate partner violence     Fear of current or ex partner: Not on file     Emotionally abused: Not on file     Physically abused: Not on file     Forced sexual activity: Not on file   Other Topics Concern    Not on file   Social History Narrative    Not on file       Allergies:  No Known Allergies    Home Medications:  Prior to Admission medications    Medication Sig Start Date End Date Taking?  Authorizing Provider   pantoprazole (PROTONIX) 40 MG tablet Take 1 tablet by mouth every morning (before breakfast) 12/16/20  Yes Dung Ayala, DO   budesonide-formoterol (SYMBICORT) 160-4.5 MCG/ACT AERO Inhale 2 puffs into the lungs 2 times daily 12/15/20 70 mg/dL and patient NOT ALERT or NPO and does not have IV access. ). 2/18/15   Ursula Gomes MD   glucose (GLUTOSE) 40 % GEL Take 15 g by mouth as needed. 2/18/15   Ursula Gomes MD   magnesium hydroxide (MILK OF MAGNESIA) 400 MG/5ML suspension Take 30 mLs by mouth daily as needed for Constipation. 2/18/15   Ursula Gomes MD   atorvastatin (LIPITOR) 20 MG tablet Take 20 mg by mouth daily. Historical Provider, MD   multivitamin (OCUVITE) TABS per tablet Take 1 tablet by mouth 2 times daily     Historical Provider, MD   montelukast (SINGULAIR) 10 MG tablet Take 1 tablet by mouth nightly. 6/7/14   Steffany Cloud MD   clopidogrel (PLAVIX) 75 MG tablet Take 1 tablet by mouth daily.  12/25/11 10/1/21  Ursula Gomes MD       Current Medications:  Current Facility-Administered Medications   Medication Dose Route Frequency Provider Last Rate Last Admin    magnesium hydroxide (MILK OF MAGNESIA) 400 MG/5ML suspension 30 mL  30 mL Oral Daily PRN Kristi Sanchez MD   30 mL at 12/13/20 1107    dexamethasone (DECADRON) injection 6 mg  6 mg Intravenous Q12H LEONA Lindsey - CNP   6 mg at 12/15/20 2155    insulin glargine (LANTUS) injection vial 30 Units  30 Units Subcutaneous Nightly Jaja E Vincent, DO   30 Units at 12/15/20 2155    insulin lispro (HUMALOG) injection vial 0-12 Units  0-12 Units Subcutaneous TID WC Jaja E Vincent, DO   8 Units at 12/15/20 1809    insulin lispro (HUMALOG) injection vial 0-6 Units  0-6 Units Subcutaneous Nightly Jaja E Vincent, DO   3 Units at 12/15/20 2155    sodium chloride flush 0.9 % injection 10 mL  10 mL Intravenous 2 times per day Jaja E Vincent, DO   10 mL at 12/15/20 2156    sodium chloride flush 0.9 % injection 10 mL  10 mL Intravenous PRN Jaja E Vincent, DO   10 mL at 12/15/20 0620    acetaminophen (TYLENOL) tablet 650 mg  650 mg Oral Q6H PRN Jaja E Vincent, DO        Or    acetaminophen (TYLENOL) suppository 650 mg  650 mg Rectal Q6H PRN Helio Walker Vincent, DO        senna (SENOKOT) tablet 8.6 mg  1 tablet Oral Daily PRN Jaja MIMS Vincent, DO   8.6 mg at 12/09/20 1715    aspirin EC tablet 325 mg  325 mg Oral Daily Jaja MIMS Vincent, DO   325 mg at 12/16/20 0809    atorvastatin (LIPITOR) tablet 20 mg  20 mg Oral Daily Jaja MIMS Vincent, DO   20 mg at 12/15/20 2155    clopidogrel (PLAVIX) tablet 75 mg  75 mg Oral Daily Jaja MIMS Vincent, DO   75 mg at 12/16/20 0810    isosorbide mononitrate (IMDUR) extended release tablet 60 mg  60 mg Oral Daily Jaja MIMS Vincent, DO   60 mg at 12/16/20 0809    levothyroxine (SYNTHROID) tablet 150 mcg  150 mcg Oral Daily Jaja MIMS Vincent, DO   150 mcg at 12/16/20 0517    montelukast (SINGULAIR) tablet 10 mg  10 mg Oral Nightly Jaja MIMS Vincent, DO   10 mg at 12/15/20 2155    oxyCODONE-acetaminophen (PERCOCET) 5-325 MG per tablet 1 tablet  1 tablet Oral Q4H PRN Jaja MIMS Vincent, DO        pantoprazole (PROTONIX) tablet 40 mg  40 mg Oral QAM AC Jaja Desouzaiter, DO   40 mg at 12/16/20 6439    polyvinyl alcohol (LIQUIFILM TEARS) 1.4 % ophthalmic solution 1 drop  1 drop Left Eye PRN Jaja MIMS Vincent, DO        sertraline (ZOLOFT) tablet 75 mg  75 mg Oral Daily Jaja MIMS Vincent, DO   75 mg at 12/16/20 0810    albuterol-ipratropium (COMBIVENT RESPIMAT)  MCG/ACT inhaler 1 puff  1 puff Inhalation Q4H PRN Jaja Desouzaiter, DO        enoxaparin (LOVENOX) injection 40 mg  40 mg Subcutaneous BID Jaja MIMS Vincent, DO   40 mg at 12/16/20 0810    0.9 % sodium chloride bolus  30 mL Intravenous PRN Jaja MIMS Vincent, DO        glucose (GLUTOSE) 40 % oral gel 15 g  15 g Oral PRN Jaja MIMS Vincent, DO        dextrose 50 % IV solution  12.5 g Intravenous PRN Jaja Kaur, DO        glucagon (rDNA) injection 1 mg  1 mg Intramuscular PRN Jaja Kaur, DO        dextrose 5 % solution  100 mL/hr Intravenous PRN Jaja Kaur, DO        miconazole (MICOTIN) 2 % powder   Topical BID Jaja Kaur,    Given at 12/16/20 7352    budesonide-formoterol (SYMBICORT) 160-4.5 MCG/ACT inhaler 2 puff  2 puff Inhalation BID Kassie Cardona MD   2 puff at 12/16/20 0810    albuterol sulfate  (90 Base) MCG/ACT inhaler 2 puff  2 puff Inhalation 4x daily Kassie Cardona MD   2 puff at 12/16/20 0810    vitamin C (ASCORBIC ACID) tablet 1,000 mg  1,000 mg Oral BID Kassie Cardona MD   1,000 mg at 12/16/20 0809    zinc sulfate (ZINCATE) capsule 50 mg  50 mg Oral Daily Kassie Cardona MD   50 mg at 12/16/20 0809      dextrose         Physical Exam:  /65   Pulse (!) 43   Temp 96.1 °F (35.6 °C) (Axillary)   Resp 16   Ht 5' 7\" (1.702 m)   Wt 257 lb (116.6 kg)   SpO2 93%   BMI 40.25 kg/m²   Weight change: Wt Readings from Last 3 Encounters:   12/12/20 257 lb (116.6 kg)   10/01/20 250 lb (113.4 kg)   09/23/20 255 lb (115.7 kg)     General: Awake, alert, oriented x3, no acute distress  HEENT: asymmetric forehead and atraumatic, extraocular movements intact, pupils equal and round, moist mucus membranes, sclera anicteric  Neck: No JVD, no carotid bruits, no thyromegaly, no adenopathy  Cardiac: juan diego rate and rhythm, normal S1 and physiologically split S2, no S3, no S4. Apical impulse is nondisplaced. No murmurs, no pericardial rubs, no clicks. Carotid upstrokes brisk. Respiratory: Clear bilaterally; no wheezes, no rales, no rhonchi. Unlabored respirations  Abdomen: Soft, nontender, nondistended, bowel sounds+, no hepatomegaly, no masses, no abdominal bruits  Extremities: No edema, no cyanosis, no clubbing. Distal pulses intact  Skin: Intact, warm and dry, no rashes, no breakdown  Musculoskeletal: normal tone and strength in the upper and lower extremities bilaterally  Neuro: No focal deficits. Moves all extremities appropriately to command.   Normal sensation in the upper and lower extremities bilaterally  Psychiatric: Cooperative, and normal affect, a little slow    Intake/Output:    Intake/Output Summary (Last 24 hours) at 12/16/2020 0851  Last data filed at 12/15/2020 2145  Gross per 24 hour   Intake --   Output 600 ml   Net -600 ml     No intake/output data recorded. Laboratory Tests:  Last 3 CBC:  Recent Labs     12/14/20  0615 12/15/20  0215 12/16/20  0503   WBC 12.0* 11.7* 11.9*   RBC 4.57 4.39 4.48   HGB 11.9* 11.5* 11.7*   HCT 37.1 35.6* 36.6*   MCV 81.2 81.1 81.7   MCH 26.0 26.2 26.1   MCHC 32.1 32.3 32.0   RDW 16.2* 16.3* 16.4*    333 333   MPV 11.4 11.4 10.9       Last 3 CMP:    Recent Labs     12/14/20  0615 12/15/20  0215 12/16/20  0230    134 130*   K 5.2* 5.2* 5.3*    105 103   CO2 21* 20* 20*   BUN 40* 41* 36*   CREATININE 1.0 1.0 1.0   GLUCOSE 309* 230* 235*   CALCIUM 8.3* 8.3* 8.4*   PROT 5.7* 5.6* 5.7*   LABALBU 2.7* 2.6* 2.6*   BILITOT 0.7 0.6 0.8   ALKPHOS 96 92 91   AST 39 29 19   ALT 80* 75* 64*       Last 3 Mag/Phos:  No results for input(s): MG, PHOS in the last 72 hours. Last 3 CK, CKMB, Troponin  No results for input(s): CKTOTAL, CKMB, TROPONINI in the last 72 hours. Last 3 Pro-BNP:  No results for input(s): PROBNP in the last 72 hours. Lab Results   Component Value Date    PROBNP 4,564 (H) 12/06/2020       Last 3 Glucose:     Recent Labs     12/14/20  0615 12/15/20  0215 12/16/20  0230   GLUCOSE 309* 230* 235*       Last 3 Coags:  Recent Labs     12/14/20  0615 12/15/20  0215 12/16/20  0230   PROTIME 13.0* 12.2 12.4   INR 1.1 1.0 1.0     Lab Results   Component Value Date    PROTIME 12.4 12/16/2020    PROTIME 12.2 12/18/2011    INR 1.0 12/16/2020       Last 3 Lipid Panel:  No results for input(s): LDLCALC, HDL, TRIG in the last 72 hours. Invalid input(s): CHLPL  Lab Results   Component Value Date    LDLCALC 77 12/19/2011     Lab Results   Component Value Date    HDL 36.0 (A) 12/19/2011     Lab Results   Component Value Date    TRIG 60 12/19/2011     No components found for: CHLPL    TSH:  No results for input(s): TSH in the last 72 hours.   Lab Results   Component Value Date TSH 12.220 03/25/2015       ABGs:  No results for input(s): PH, PO2, PCO2, HCO3, BE, O2SAT in the last 72 hours. Lactic Acid:  No results for input(s): LACTA in the last 72 hours. Radiology:  RAD Results:  XR CHEST PORTABLE   Final Result   Stable abnormal chest with diffuse infiltrates with marginal improvement   likely pneumonia. XR CHEST PORTABLE   Final Result   Diffuse bilateral pneumonia. EKG and Telemetry:  12-lead EKG personally reviewed and shows from 12.6.2020 \" possible \"afib-(too much artifact) RBBB,LAHB    Telemetry personally reviewed and shows sinus bradycardia about 40-50's bpm, no pauses        ASSESSMENT / PLAN:    1. Sinus bradycardia- asymptomatic man, no pauses. Does not have a class 1 indication for pacing. I suspect some sick sinus syndrome with a chronic RBBB/LAHB increased sympathetic tone from DEMARCUS. Leave off beta blockers and CCBs. States hasn't used bipap since for years it would be reasonable to treat this before considering device. Is stable for discharge-f/[facility] cardiology one month and would then do a holter to reassess for significant chronotropic incompetence. 2 COVID- appears to be better to the point of ready for discharge. 3 CAD remote PCI, no angina, stopped coreg, can continue plavix and statin. should stop smoking  4 `Hyperlipidimiea- on statin   5 DEMARCUS-consider re-evaluation as outpatient  6 DM- per PCP      Stable from cardiac standpoint to go to [facility] Cardiology will arrange for f/u. Thank you for consultation.     Stephania Lal MD, [facility]  The [address] at [facility]    Electronically signed by Stephania Lal MD on 12/16/2020 at 8:51 AM

## 2020-12-16 NOTE — CARE COORDINATION
Social Work / Discharge Planning : COVID POSITIVE : Patient will be discharged back to [address] today at 4:00 via [facility] [phone]. Patient updated per PAULY as well as his sister susanne and marybeth kong [facility] and RN. PAULY to follow.  Electronically signed by PIOTR Mejia on 12/16/20 at 10:47 AM EST

## 2020-12-16 NOTE — PROGRESS NOTES
Pt weaned to 2L oxygen -- O2 sat ranging from 93%-95%    Electronically signed by Lynne Mcclellan RN on 12/16/2020 at 2:45 AM

## 2020-12-16 NOTE — PROGRESS NOTES
Internal Medicine Progress Note    Patient's name: Cedric Britton  : 1950  Admission date: 2020  Date of service: 2020   Room: [address] MED SURG/TELE  Primary care physician: Grisel Salcido DO  Reason for visit: Follow-up for COVID-19    Subjective  Geralynn Splinter was seen and examined at bedside. He did that he feels better. He is not short of breath at this time. Patient denies any chest pain. Patient's heart rate remained in the 50s. Cardiology will see patient. Patient stated that he would like to go home. Review of Systems  There are no new complaints of chest pain, abdominal pain, nausea, vomiting, diarrhea, constipation.     Hospital Medications  Current Facility-Administered Medications   Medication Dose Route Frequency Provider Last Rate Last Admin    magnesium hydroxide (MILK OF MAGNESIA) 400 MG/5ML suspension 30 mL  30 mL Oral Daily PRN Duncan Olivarez MD   30 mL at 20 1107    dexamethasone (DECADRON) injection 6 mg  6 mg Intravenous Q12H LEONA Guzman - CNP   6 mg at 12/15/20 215    insulin glargine (LANTUS) injection vial 30 Units  30 Units Subcutaneous Nightly Jaja E Vincent, DO   30 Units at 12/15/20 2155    insulin lispro (HUMALOG) injection vial 0-12 Units  0-12 Units Subcutaneous TID WC Jaja E Vincent, DO   8 Units at 12/15/20 1809    insulin lispro (HUMALOG) injection vial 0-6 Units  0-6 Units Subcutaneous Nightly Jaja E Vincent, DO   3 Units at 12/15/20 2155    sodium chloride flush 0.9 % injection 10 mL  10 mL Intravenous 2 times per day Jaja E Vincent, DO   10 mL at 12/15/20 2156    sodium chloride flush 0.9 % injection 10 mL  10 mL Intravenous PRN Jaja E Vincent, DO   10 mL at 12/15/20 0620    acetaminophen (TYLENOL) tablet 650 mg  650 mg Oral Q6H PRN Jaja E Vincent, DO        Or    acetaminophen (TYLENOL) suppository 650 mg  650 mg Rectal Q6H PRN Jaja E Vincent, DO        senna (SENOKOT) tablet 8.6 mg  1 tablet Oral Daily PRN Gabriel Lyn Vincent, DO   8.6 mg at 12/09/20 1715    aspirin EC tablet 325 mg  325 mg Oral Daily Jaja Desouzaiter, DO   325 mg at 12/15/20 0810    atorvastatin (LIPITOR) tablet 20 mg  20 mg Oral Daily Jaja MIMS Vincent, DO   20 mg at 12/15/20 2155    clopidogrel (PLAVIX) tablet 75 mg  75 mg Oral Daily aJja MIMS Vincent, DO   75 mg at 12/15/20 0163    isosorbide mononitrate (IMDUR) extended release tablet 60 mg  60 mg Oral Daily Jaja MIMS Vincent, DO   60 mg at 12/15/20 3118    levothyroxine (SYNTHROID) tablet 150 mcg  150 mcg Oral Daily Jaja Desouzaiter, DO   150 mcg at 12/16/20 0517    montelukast (SINGULAIR) tablet 10 mg  10 mg Oral Nightly Jaja Desouzaiter, DO   10 mg at 12/15/20 2155    oxyCODONE-acetaminophen (PERCOCET) 5-325 MG per tablet 1 tablet  1 tablet Oral Q4H PRN Jaja Desouzaiter, DO        pantoprazole (PROTONIX) tablet 40 mg  40 mg Oral QAM AC Jaja Kaur, DO   40 mg at 12/16/20 8597    polyvinyl alcohol (LIQUIFILM TEARS) 1.4 % ophthalmic solution 1 drop  1 drop Left Eye PRN Jaja Desouzaiter, DO        sertraline (ZOLOFT) tablet 75 mg  75 mg Oral Daily Jaja Kaur, DO   75 mg at 12/15/20 8454    albuterol-ipratropium (COMBIVENT RESPIMAT)  MCG/ACT inhaler 1 puff  1 puff Inhalation Q4H PRN Jaja Desouzaiter, DO        enoxaparin (LOVENOX) injection 40 mg  40 mg Subcutaneous BID Jaja MIMS Vincent, DO   40 mg at 12/15/20 2155    0.9 % sodium chloride bolus  30 mL Intravenous PRN Jaja MIMS Vincent, DO        glucose (GLUTOSE) 40 % oral gel 15 g  15 g Oral PRN Jaja MIMS Vincent, DO        dextrose 50 % IV solution  12.5 g Intravenous PRN Jaja MIMS Vincent, DO        glucagon (rDNA) injection 1 mg  1 mg Intramuscular PRN Jaja E Vincent, DO        dextrose 5 % solution  100 mL/hr Intravenous PRN Jaja Kaur DO        miconazole (MICOTIN) 2 % powder   Topical BID Jaja Kaur DO   Given at 12/15/20 2202    budesonide-formoterol (SYMBICORT) 160-4.5 MCG/ACT inhaler 2 puff  2 puff 12/16/2020    TSH 12.220 (H) 03/25/2015    LABA1C 12.1 (H) 02/05/2015       XR CHEST PORTABLE   Final Result   Stable abnormal chest with diffuse infiltrates with marginal improvement   likely pneumonia. XR CHEST PORTABLE   Final Result   Diffuse bilateral pneumonia. Assessment   Active Hospital Problems    Diagnosis    Pneumonia due to COVID-19 virus [U07.1, J12.89]     Priority: High    Acute respiratory failure with hypoxia ([facility]) [J96.01]     Priority: Medium    Bilateral carotid artery stenosis [I65.23]    History of stroke [Z86.73]    Bell's palsy [G51.0]    Facial droop [R29.810]    ASHD (arteriosclerotic heart disease) [I25.10]    COPD (chronic obstructive pulmonary disease) ([facility]) [J44.9]    Tobacco abuse [Z72.0]    CAD (coronary artery disease) [I25.10]    Diabetes mellitus ([facility] [E11.9]    Hyperlipidemia [E78.5]    DEMARCUS (obstructive sleep apnea) [G47.33]         Plan  · COVID-19 viral pneumonia w/ associated acute hypoxic respiratory failure: Contact/droplet isolation. sp Remdesivir-- 5 days complete. Decadron weaning. [place] as able. Albuterol aerosols. Tylenol prn fevers or myalgias. Self proning as able. BID Lovenox. Vitamin C/thiamine/Zinc/VitD. · Bradycardia: Continue to hold Coreg. Monitor. Cardio consult. Cleared for outpatient follow-up with cardiology  · PT/OT  · Follow labs   · DVT prophylaxis  · Please see orders for further management and care. · Discharge plan: back to SNF soon-not today due to bradycardia    Patient was seen and evaluated by Resident Dr. Bhupendra Friedman  Patient was seen in conjunction with Dr. Yousif Ortega. Patient's note was done using dictation software and there may be some dictation errors secondary to this. Bhupendra Friedman signed 12/16/2020 at 9:57 AM  EM PGY1    Addendum: I have personally participated in a face-to-face history and physical exam on the date of service with the patient. I have discussed the case with the resident.  I also participated in medical decision making with the resident on the date of service and I agree with all of the pertinent clinical information unless indicated in my editing of the note. I have reviewed and edited the note above based on my findings during my history, exam, and decision making on the same day of service. My additional thoughts:    Continue to hold beta-blocker   Outpatient evaluation for sleep apnea  [place]iel [place] for discharge per cardiology   I will see him at the nursing home next week    Electronically signed by Alice Guerrero DO on 12/16/2020 at 9:59 AM    I can be reached through [employer].

## 2020-12-16 NOTE — DISCHARGE SUMMARY
Internal Medicine Discharge Summary    NAME: Maya Calle :  1950  MRN:  [id number] PCP:Dung Ayala DO    ADMITTED: 2020   DISCHARGED: 2020  6:38 PM    ADMITTING PHYSICIAN: Christina Zhou DO    PCP: Christina Zhou DO    CONSULTANT(S):   IP CONSULT TO INTERNAL MEDICINE  IP CONSULT TO PHARMACY  IP CONSULT TO PULMONOLOGY  IP CONSULT TO IV TEAM  IP CONSULT TO PALLIATIVE CARE  IP CONSULT TO IV TEAM  IP CONSULT TO IV TEAM  IP CONSULT TO IV TEAM  IP CONSULT TO IV TEAM  IP CONSULT TO CARDIOLOGY     ADMITTING DIAGNOSIS:   Acute respiratory failure with hypoxia ([address] [J96.01]  Acute respiratory failure with hypoxia ([address] [J96.01]     Please see H&P for further details    DISCHARGE DIAGNOSES:   Active Hospital Problems    Diagnosis    Pneumonia due to COVID-19 virus [U07.1, J12.89]     Priority: High    Acute respiratory failure with hypoxia (HCC) [J96.01]     Priority: Medium    Bilateral carotid artery stenosis [I65.23]    History of stroke [Z86.73]    Bell's palsy [G51.0]    Facial droop [R29.810]    ASHD (arteriosclerotic heart disease) [I25.10]    COPD (chronic obstructive pulmonary disease) ([address] [J44.9]    Tobacco abuse [Z72.0]    CAD (coronary artery disease) [I25.10]    Diabetes mellitus ([address] [E11.9]    Hyperlipidemia [E78.5]    DEMARCUS (obstructive sleep apnea) [G47.33]       BRIEF HISTORY OF PRESENT ILLNESS: Maya Calle is a 65 y.o. male patient of Christina Zhou DO who  has a past medical history of Arthritis, Asthma, Bilateral carotid artery stenosis, CAD (coronary artery disease), COPD (chronic obstructive pulmonary disease) ([address] Diabetes mellitus ([address] Marcus's gangrene, Hyperlipidemia, Hypothyroidism, DEMARCUS (obstructive sleep apnea), Pneumonia, and Stroke ([address]  who originally had concerns including Shortness of Breath and Positive For Covid-19. at presentation on 2020, and was found to have Acute respiratory failure with hypoxia ([address] [J96.01]  Acute respiratory failure with hypoxia ([facility] [J96.01] after workup. Please see H&P for further details. HOSPITAL COURSE:   The patient presented to the hospital with the chief complaint of Shortness of Breath and Positive For Covid-19. The patient was admitted to the hospital.     · COVID-19 viral pneumonia w/ associated acute hypoxic respiratory failure: Contact/droplet isolation. sp Remdesivir-- 5 days complete. Decadron weaning. Skye Lacy as able. Albuterol aerosols. Tylenol prn fevers or myalgias. Self proning as able. BID Lovenox. Vitamin C/thiamine/Zinc/VitD. · Bradycardia: Continue to hold Coreg. Monitor. Cardio consult. Cleared for outpatient follow-up with cardiology  · [facility]    BRIEF PHYSICAL EXAMINATION AND LABORATORIES ON DAY OF DISCHARGE:  VITALS:  /65   Pulse (!) 43   Temp 96.1 °F (35.6 °C) (Axillary)   Resp 16   Ht 5' 7\" (1.702 m)   Wt 257 lb (116.6 kg)   SpO2 93%   BMI 40.25 kg/m²     Please see note from the same day.      LABS[de-identified]  Recent Labs     12/14/20  0615 12/15/20  0215 12/16/20  0230    134 130*   K 5.2* 5.2* 5.3*    105 103   CO2 21* 20* 20*   BUN 40* 41* 36*   CREATININE 1.0 1.0 1.0   GLUCOSE 309* 230* 235*   CALCIUM 8.3* 8.3* 8.4*     Recent Labs     12/14/20  0615 12/15/20  0215 12/16/20  0230   ALKPHOS 96 92 91   ALT 80* 75* 64*   AST 39 29 19   PROT 5.7* 5.6* 5.7*   BILITOT 0.7 0.6 0.8   LABALBU 2.7* 2.6* 2.6*     Recent Labs     12/14/20  0615 12/15/20  0215 12/16/20  0503   WBC 12.0* 11.7* 11.9*   RBC 4.57 4.39 4.48   HGB 11.9* 11.5* 11.7*   HCT 37.1 35.6* 36.6*   MCV 81.2 81.1 81.7   MCH 26.0 26.2 26.1   MCHC 32.1 32.3 32.0   RDW 16.2* 16.3* 16.4*    333 333   MPV 11.4 11.4 10.9     Lab Results   Component Value Date    LABA1C 12.1 (H) 02/05/2015    LABA1C 11.5 (H) 06/03/2014    LABA1C 11.2 (H) 12/18/2011     Lab Results   Component Value Date    INR 1.0 12/16/2020    INR 1.0 12/15/2020    INR 1.1 12/14/2020    PROTIME 12.4 12/16/2020    PROTIME 12.2 12/15/2020    PROTIME 13.0 (H) 12/14/2020      Lab Results   Component Value Date    TSH 12.220 (H) 03/25/2015    TSH 0.263 (L) 12/19/2011     Lab Results   Component Value Date    TRIG 60 12/19/2011    HDL 36.0 (A) 12/19/2011    LDLCALC 77 12/19/2011     No results for input(s): MG in the last 72 hours. No results for input(s): CKTOTAL, CKMB, TROPONINI in the last 72 hours. No results for input(s): LACTA in the last 72 hours. IMAGING:  Xr Chest Portable    Result Date: 12/12/2020  EXAMINATION: ONE XRAY VIEW OF THE CHEST 12/12/2020 12:11 pm COMPARISON: December 6, 2020 HISTORY: ORDERING SYSTEM PROVIDED HISTORY: pna TECHNOLOGIST PROVIDED HISTORY: Reason for exam:->pna FINDINGS: There is cardiomegaly. There is mild vascular congestion. There is persistent multifocal diffuse ground-glass infiltrates with marginal improvement especially in the left side. Stable abnormal chest with diffuse infiltrates with marginal improvement likely pneumonia. Xr Chest Portable    Result Date: 12/6/2020  EXAMINATION: ONE XRAY VIEW OF THE CHEST 12/6/2020 10:39 pm COMPARISON: March 28, 2015 HISTORY: ORDERING SYSTEM PROVIDED HISTORY: sob TECHNOLOGIST PROVIDED HISTORY: Reason for exam:->sob FINDINGS: Diffuse bilateral infiltrates are noted. The heart is mildly enlarged. No pneumothorax. The costophrenic angles are clear. Diffuse bilateral pneumonia. MICROBIOLOGY:  BLOOD CX #1  No results for input(s): BC in the last 72 hours. BLOOD CX #2  No results for input(s): Ramona Canter in the last 72 hours. TIP CULTURE  No results for input(s): CXCATHTIP in the last 72 hours. CULTURE, RESPIRATORY   No results for input(s): CULTRESP in the last 72 hours. RESPIRATORY SMEAR  No results for input(s): RESPSMEAR in the last 72 hours. DISPOSITION:  The patient's condition is fair.    The patient is being discharged to nursing home    DISCHARGE MEDICATIONS:    Raynell Dakin   Home Medication Instructions W:[id number]    Printed on:12/17/20 0704   Medication Information                      acetaminophen 650 MG TABS  Take 650 mg by mouth every 4 hours as needed. albuterol-ipratropium (COMBIVENT RESPIMAT)  MCG/ACT AERS inhaler  Inhale 1 puff into the lungs every 4 hours as needed for Wheezing or Shortness of Breath             aspirin (ECOTRIN) 325 MG EC tablet  Take 325 mg by mouth daily. Do not crush             atorvastatin (LIPITOR) 20 MG tablet  Take 20 mg by mouth daily. budesonide-formoterol (SYMBICORT) 160-4.5 MCG/ACT AERO  Inhale 2 puffs into the lungs 2 times daily             citalopram (CELEXA) 10 MG tablet  Take 10 mg by mouth daily             clopidogrel (PLAVIX) 75 MG tablet  Take 1 tablet by mouth daily. glucagon, rDNA, 1 MG SOLR injection  Inject 1 mg into the muscle as needed for Low blood sugar (Blood glucose less than 70 mg/dL and patient NOT ALERT or NPO and does not have IV access. ). glucose (GLUTOSE) 40 % GEL  Take 15 g by mouth as needed. insulin aspart (NOVOLOG FLEXPEN) 100 UNIT/ML injection pen  Inject 12 Units into the skin 3 times daily (before meals)             insulin aspart (NOVOLOG FLEXPEN) 100 UNIT/ML injection pen  Inject 0-12 Units into the skin 3 times daily (before meals) 0-140= 0 units  141-180= 2 units  181-220= 4 units  221-260= 6 units  261-300= 8 units  301-340= 10 units  341+ = 12 units and notify md             insulin glargine (LANTUS) 100 UNIT/ML injection vial  Inject 43 Units into the skin nightly             isosorbide mononitrate (IMDUR) 60 MG CR tablet  Take 1 tablet by mouth daily. levothyroxine (SYNTHROID) 175 MCG tablet  Take 150 mcg by mouth Daily              magnesium hydroxide (MILK OF MAGNESIA) 400 MG/5ML suspension  Take 30 mLs by mouth daily as needed for Constipation.              metFORMIN (GLUCOPHAGE) 1000 MG tablet  Take 1,000 mg by mouth 2 times daily (with meals) montelukast (SINGULAIR) 10 MG tablet  Take 1 tablet by mouth nightly.              multivitamin (OCUVITE) TABS per tablet  Take 1 tablet by mouth 2 times daily              oxyCODONE-acetaminophen (PERCOCET) 5-325 MG per tablet    Take 1 tablet by mouth every 4 hours as needed for Pain Instructed to take am of procedure if needed             pantoprazole (PROTONIX) 40 MG tablet  Take 1 tablet by mouth every morning (before breakfast)             polyvinyl alcohol (LIQUIFILM TEARS) 1.4 % ophthalmic solution  Place 1 drop into the left eye as needed             vitamin D (ERGOCALCIFEROL) 72110 UNITS CAPS capsule  Take 2,000 Units by mouth daily                  Discharge Medication List as of 12/16/2020  1:47 PM      CONTINUE these medications which have CHANGED    Details   pantoprazole (PROTONIX) 40 MG tablet Take 1 tablet by mouth every morning (before breakfast), Disp-30 tablet, R-0DC to [facility]           Discharge Medication List as of 12/16/2020  1:47 PM      STOP taking these medications       famotidine (PEPCID) 20 MG tablet Comments:   Reason for Stopping:         lisinopril (PRINIVIL;ZESTRIL) 10 MG tablet Comments:   Reason for Stopping:         Insulin Aspart Prot & Aspart (NOVOLOG MIX 70/30 SC) Comments:   Reason for Stopping:         sertraline (ZOLOFT) 50 MG tablet Comments:   Reason for Stopping:         carvedilol (COREG) 6.25 MG tablet Comments:   Reason for Stopping:             Discharge Medication List as of 12/16/2020  1:47 PM      START taking these medications    Details   budesonide-formoterol (SYMBICORT) 160-4.5 MCG/ACT AERO Inhale 2 puffs into the lungs 2 times daily, Disp-1 Inhaler, R-3DC to [facility]      albuterol-ipratropium (COMBIVENT RESPIMAT)  MCG/ACT AERS inhaler Inhale 1 puff into the lungs every 4 hours as needed for Wheezing or Shortness of BreathDC to [facility]             INTERNAL MEDICINE FOLLOW UP/INSTRUCTIONS:  · Follow-up with primary care physician as directed in discharge paperwork. · Please review results of imaging studies with PCP. · Follow-up with consultants as directed by them. · If recurrence or worsening of symptoms go to the ED or call primary care physician. · Diet: DIET CARB CONTROL; Carb Control: 4 carbs/meal (approximate 1800 kcals/day)    Preparing for this patient's discharge, including paperwork, orders, instructions, and meeting with patient did required 35 minutes.     Electronically signed by Zane Rice DO on 12/17/2020 at 10:31 PM

## 2021-03-21 ENCOUNTER — APPOINTMENT (OUTPATIENT)
Dept: GENERAL RADIOLOGY | Age: 71
DRG: 280 | End: 2021-03-21
Payer: MEDICARE

## 2021-03-21 ENCOUNTER — APPOINTMENT (OUTPATIENT)
Dept: CT IMAGING | Age: 71
DRG: 280 | End: 2021-03-21
Payer: MEDICARE

## 2021-03-21 ENCOUNTER — HOSPITAL ENCOUNTER (INPATIENT)
Age: 71
LOS: 4 days | Discharge: NURSING, REHAB OR LONG-TERM CARE | DRG: 280 | End: 2021-03-25
Attending: EMERGENCY MEDICINE | Admitting: INTERNAL MEDICINE
Payer: MEDICARE

## 2021-03-21 DIAGNOSIS — I50.9 ACUTE ON CHRONIC CONGESTIVE HEART FAILURE, UNSPECIFIED HEART FAILURE TYPE (HCC): Primary | ICD-10-CM

## 2021-03-21 PROBLEM — I50.33 ACUTE ON CHRONIC CONGESTIVE HEART FAILURE WITH LEFT VENTRICULAR DIASTOLIC DYSFUNCTION (HCC): Status: ACTIVE | Noted: 2021-03-21

## 2021-03-21 LAB
ALBUMIN SERPL-MCNC: 3.6 G/DL (ref 3.5–5.2)
ALP BLD-CCNC: 82 U/L (ref 40–129)
ALT SERPL-CCNC: 18 U/L (ref 0–40)
AMMONIA: 33.3 UMOL/L (ref 16–60)
ANION GAP SERPL CALCULATED.3IONS-SCNC: 9 MMOL/L (ref 7–16)
APTT: 35.4 SEC (ref 24.5–35.1)
AST SERPL-CCNC: 26 U/L (ref 0–39)
B.E.: 1.8 MMOL/L (ref -3–3)
BACTERIA: NORMAL /HPF
BASOPHILS ABSOLUTE: 0.06 E9/L (ref 0–0.2)
BASOPHILS RELATIVE PERCENT: 0.4 % (ref 0–2)
BILIRUB SERPL-MCNC: 0.7 MG/DL (ref 0–1.2)
BILIRUBIN URINE: NEGATIVE
BLOOD, URINE: ABNORMAL
BUN BLDV-MCNC: 24 MG/DL (ref 8–23)
CALCIUM SERPL-MCNC: 9 MG/DL (ref 8.6–10.2)
CHLORIDE BLD-SCNC: 106 MMOL/L (ref 98–107)
CHP ED QC CHECK: NORMAL
CLARITY: CLEAR
CO2: 28 MMOL/L (ref 22–29)
COHB: 1.1 % (ref 0–1.5)
COLOR: ABNORMAL
CREAT SERPL-MCNC: 1.2 MG/DL (ref 0.7–1.2)
CRITICAL: ABNORMAL
DATE ANALYZED: ABNORMAL
DATE OF COLLECTION: ABNORMAL
EOSINOPHILS ABSOLUTE: 0.06 E9/L (ref 0.05–0.5)
EOSINOPHILS RELATIVE PERCENT: 0.4 % (ref 0–6)
GFR AFRICAN AMERICAN: >60
GFR NON-AFRICAN AMERICAN: 59 ML/MIN/1.73
GLUCOSE BLD-MCNC: 93 MG/DL (ref 74–99)
GLUCOSE BLD-MCNC: 96 MG/DL
GLUCOSE BLD-MCNC: 96 MG/DL (ref 74–99)
GLUCOSE URINE: NEGATIVE MG/DL
HCO3: 29.1 MMOL/L (ref 22–26)
HCT VFR BLD CALC: 40.6 % (ref 37–54)
HEMOGLOBIN: 11.9 G/DL (ref 12.5–16.5)
HHB: 6.1 % (ref 0–5)
HYALINE CASTS: NORMAL /LPF (ref 0–2)
IMMATURE GRANULOCYTES #: 0.08 E9/L
IMMATURE GRANULOCYTES %: 0.6 % (ref 0–5)
INR BLD: 1.2
KETONES, URINE: NEGATIVE MG/DL
LAB: ABNORMAL
LACTIC ACID, SEPSIS: 1.2 MMOL/L (ref 0.5–1.9)
LEUKOCYTE ESTERASE, URINE: NEGATIVE
LYMPHOCYTES ABSOLUTE: 0.91 E9/L (ref 1.5–4)
LYMPHOCYTES RELATIVE PERCENT: 6.4 % (ref 20–42)
Lab: ABNORMAL
MAGNESIUM: 2 MG/DL (ref 1.6–2.6)
MCH RBC QN AUTO: 24.4 PG (ref 26–35)
MCHC RBC AUTO-ENTMCNC: 29.3 % (ref 32–34.5)
MCV RBC AUTO: 83.4 FL (ref 80–99.9)
METER GLUCOSE: 128 MG/DL (ref 74–99)
METER GLUCOSE: 77 MG/DL (ref 74–99)
METER GLUCOSE: 93 MG/DL (ref 74–99)
METER GLUCOSE: 96 MG/DL (ref 74–99)
METER GLUCOSE: <40 MG/DL (ref 74–99)
METHB: 0.1 % (ref 0–1.5)
MODE: ABNORMAL
MONOCYTES ABSOLUTE: 1.39 E9/L (ref 0.1–0.95)
MONOCYTES RELATIVE PERCENT: 9.7 % (ref 2–12)
NEUTROPHILS ABSOLUTE: 11.8 E9/L (ref 1.8–7.3)
NEUTROPHILS RELATIVE PERCENT: 82.5 % (ref 43–80)
NITRITE, URINE: NEGATIVE
O2 CONTENT: 16.5 ML/DL
O2 SATURATION: 93.8 % (ref 92–98.5)
O2HB: 92.7 % (ref 94–97)
OPERATOR ID: ABNORMAL
PATIENT TEMP: 37 C
PCO2: 58.5 MMHG (ref 35–45)
PDW BLD-RTO: 15.9 FL (ref 11.5–15)
PH BLOOD GAS: 7.32 (ref 7.35–7.45)
PH UA: 5 (ref 5–9)
PLATELET # BLD: 278 E9/L (ref 130–450)
PMV BLD AUTO: 10.5 FL (ref 7–12)
PO2: 77.9 MMHG (ref 75–100)
POTASSIUM SERPL-SCNC: 4.3 MMOL/L (ref 3.5–5)
PRO-BNP: 6100 PG/ML (ref 0–125)
PROCALCITONIN: 0.12 NG/ML (ref 0–0.08)
PROTEIN UA: 30 MG/DL
PROTHROMBIN TIME: 13 SEC (ref 9.3–12.4)
RBC # BLD: 4.87 E12/L (ref 3.8–5.8)
RBC UA: NORMAL /HPF (ref 0–2)
SARS-COV-2, NAAT: NOT DETECTED
SODIUM BLD-SCNC: 143 MMOL/L (ref 132–146)
SOURCE, BLOOD GAS: ABNORMAL
SPECIFIC GRAVITY UA: >=1.03 (ref 1–1.03)
THB: 12.6 G/DL (ref 11.5–16.5)
TIME ANALYZED: 1344
TOTAL PROTEIN: 7 G/DL (ref 6.4–8.3)
TROPONIN: 0.23 NG/ML (ref 0–0.03)
TROPONIN: 0.32 NG/ML (ref 0–0.03)
UROBILINOGEN, URINE: 1 E.U./DL
WBC # BLD: 14.3 E9/L (ref 4.5–11.5)
WBC UA: NORMAL /HPF (ref 0–5)

## 2021-03-21 PROCEDURE — 94660 CPAP INITIATION&MGMT: CPT

## 2021-03-21 PROCEDURE — 83880 ASSAY OF NATRIURETIC PEPTIDE: CPT

## 2021-03-21 PROCEDURE — 83605 ASSAY OF LACTIC ACID: CPT

## 2021-03-21 PROCEDURE — 81001 URINALYSIS AUTO W/SCOPE: CPT

## 2021-03-21 PROCEDURE — 94640 AIRWAY INHALATION TREATMENT: CPT

## 2021-03-21 PROCEDURE — 2580000003 HC RX 258: Performed by: EMERGENCY MEDICINE

## 2021-03-21 PROCEDURE — 72125 CT NECK SPINE W/O DYE: CPT

## 2021-03-21 PROCEDURE — 2500000003 HC RX 250 WO HCPCS: Performed by: EMERGENCY MEDICINE

## 2021-03-21 PROCEDURE — 36415 COLL VENOUS BLD VENIPUNCTURE: CPT

## 2021-03-21 PROCEDURE — 2580000003 HC RX 258: Performed by: INTERNAL MEDICINE

## 2021-03-21 PROCEDURE — 87040 BLOOD CULTURE FOR BACTERIA: CPT

## 2021-03-21 PROCEDURE — 6360000002 HC RX W HCPCS: Performed by: INTERNAL MEDICINE

## 2021-03-21 PROCEDURE — 93005 ELECTROCARDIOGRAM TRACING: CPT | Performed by: EMERGENCY MEDICINE

## 2021-03-21 PROCEDURE — 85610 PROTHROMBIN TIME: CPT

## 2021-03-21 PROCEDURE — 87088 URINE BACTERIA CULTURE: CPT

## 2021-03-21 PROCEDURE — 82805 BLOOD GASES W/O2 SATURATION: CPT

## 2021-03-21 PROCEDURE — 84484 ASSAY OF TROPONIN QUANT: CPT

## 2021-03-21 PROCEDURE — 87635 SARS-COV-2 COVID-19 AMP PRB: CPT

## 2021-03-21 PROCEDURE — 99285 EMERGENCY DEPT VISIT HI MDM: CPT

## 2021-03-21 PROCEDURE — 85025 COMPLETE CBC W/AUTO DIFF WBC: CPT

## 2021-03-21 PROCEDURE — 82962 GLUCOSE BLOOD TEST: CPT

## 2021-03-21 PROCEDURE — 83735 ASSAY OF MAGNESIUM: CPT

## 2021-03-21 PROCEDURE — 2060000000 HC ICU INTERMEDIATE R&B

## 2021-03-21 PROCEDURE — 70450 CT HEAD/BRAIN W/O DYE: CPT

## 2021-03-21 PROCEDURE — 2500000003 HC RX 250 WO HCPCS: Performed by: INTERNAL MEDICINE

## 2021-03-21 PROCEDURE — 96372 THER/PROPH/DIAG INJ SC/IM: CPT

## 2021-03-21 PROCEDURE — 82140 ASSAY OF AMMONIA: CPT

## 2021-03-21 PROCEDURE — 80053 COMPREHEN METABOLIC PANEL: CPT

## 2021-03-21 PROCEDURE — 71045 X-RAY EXAM CHEST 1 VIEW: CPT

## 2021-03-21 PROCEDURE — 2700000000 HC OXYGEN THERAPY PER DAY

## 2021-03-21 PROCEDURE — 82947 ASSAY GLUCOSE BLOOD QUANT: CPT

## 2021-03-21 PROCEDURE — 51702 INSERT TEMP BLADDER CATH: CPT

## 2021-03-21 PROCEDURE — 85730 THROMBOPLASTIN TIME PARTIAL: CPT

## 2021-03-21 PROCEDURE — 84145 PROCALCITONIN (PCT): CPT

## 2021-03-21 RX ORDER — CITALOPRAM 20 MG/1
10 TABLET ORAL DAILY
Status: DISCONTINUED | OUTPATIENT
Start: 2021-03-21 | End: 2021-03-25 | Stop reason: HOSPADM

## 2021-03-21 RX ORDER — MONTELUKAST SODIUM 10 MG/1
10 TABLET ORAL NIGHTLY
Status: DISCONTINUED | OUTPATIENT
Start: 2021-03-21 | End: 2021-03-25 | Stop reason: HOSPADM

## 2021-03-21 RX ORDER — LEVOTHYROXINE SODIUM 0.07 MG/1
150 TABLET ORAL DAILY
Status: DISCONTINUED | OUTPATIENT
Start: 2021-03-21 | End: 2021-03-25 | Stop reason: HOSPADM

## 2021-03-21 RX ORDER — BUDESONIDE 0.5 MG/2ML
0.5 INHALANT ORAL 2 TIMES DAILY
Status: DISCONTINUED | OUTPATIENT
Start: 2021-03-21 | End: 2021-03-25 | Stop reason: HOSPADM

## 2021-03-21 RX ORDER — SODIUM CHLORIDE 0.9 % (FLUSH) 0.9 %
10 SYRINGE (ML) INJECTION EVERY 12 HOURS SCHEDULED
Status: DISCONTINUED | OUTPATIENT
Start: 2021-03-21 | End: 2021-03-25 | Stop reason: HOSPADM

## 2021-03-21 RX ORDER — HYDROXYZINE PAMOATE 25 MG/1
25 CAPSULE ORAL 2 TIMES DAILY PRN
Status: ON HOLD | COMMUNITY
End: 2021-03-24 | Stop reason: HOSPADM

## 2021-03-21 RX ORDER — AMMONIUM LACTATE 12 G/100G
LOTION TOPICAL 2 TIMES DAILY PRN
Status: DISCONTINUED | OUTPATIENT
Start: 2021-03-21 | End: 2021-03-21 | Stop reason: RX

## 2021-03-21 RX ORDER — ARFORMOTEROL TARTRATE 15 UG/2ML
15 SOLUTION RESPIRATORY (INHALATION) 2 TIMES DAILY
Status: DISCONTINUED | OUTPATIENT
Start: 2021-03-21 | End: 2021-03-25 | Stop reason: HOSPADM

## 2021-03-21 RX ORDER — SENNA PLUS 8.6 MG/1
1 TABLET ORAL DAILY PRN
Status: DISCONTINUED | OUTPATIENT
Start: 2021-03-21 | End: 2021-03-25 | Stop reason: HOSPADM

## 2021-03-21 RX ORDER — DIVALPROEX SODIUM 125 MG/1
125 CAPSULE, COATED PELLETS ORAL 2 TIMES DAILY
COMMUNITY
End: 2021-08-10

## 2021-03-21 RX ORDER — DEXTROSE MONOHYDRATE 50 MG/ML
INJECTION, SOLUTION INTRAVENOUS CONTINUOUS
Status: DISCONTINUED | OUTPATIENT
Start: 2021-03-21 | End: 2021-03-22

## 2021-03-21 RX ORDER — DEXTROSE MONOHYDRATE 50 MG/ML
100 INJECTION, SOLUTION INTRAVENOUS PRN
Status: DISCONTINUED | OUTPATIENT
Start: 2021-03-21 | End: 2021-03-25 | Stop reason: HOSPADM

## 2021-03-21 RX ORDER — PANTOPRAZOLE SODIUM 40 MG/1
40 TABLET, DELAYED RELEASE ORAL
Status: DISCONTINUED | OUTPATIENT
Start: 2021-03-22 | End: 2021-03-25 | Stop reason: HOSPADM

## 2021-03-21 RX ORDER — CLOPIDOGREL BISULFATE 75 MG/1
75 TABLET ORAL DAILY
Status: DISCONTINUED | OUTPATIENT
Start: 2021-03-21 | End: 2021-03-25 | Stop reason: HOSPADM

## 2021-03-21 RX ORDER — POTASSIUM CHLORIDE 20 MEQ/1
40 TABLET, EXTENDED RELEASE ORAL PRN
Status: DISCONTINUED | OUTPATIENT
Start: 2021-03-21 | End: 2021-03-25 | Stop reason: HOSPADM

## 2021-03-21 RX ORDER — VITS A,C,E/LUTEIN/MINERALS 300MCG-200
1 TABLET ORAL 2 TIMES DAILY
Status: DISCONTINUED | OUTPATIENT
Start: 2021-03-21 | End: 2021-03-25 | Stop reason: HOSPADM

## 2021-03-21 RX ORDER — POTASSIUM CHLORIDE 7.45 MG/ML
10 INJECTION INTRAVENOUS PRN
Status: DISCONTINUED | OUTPATIENT
Start: 2021-03-21 | End: 2021-03-25 | Stop reason: HOSPADM

## 2021-03-21 RX ORDER — ACETAMINOPHEN 650 MG/1
650 SUPPOSITORY RECTAL EVERY 6 HOURS PRN
Status: DISCONTINUED | OUTPATIENT
Start: 2021-03-21 | End: 2021-03-25 | Stop reason: HOSPADM

## 2021-03-21 RX ORDER — ATORVASTATIN CALCIUM 40 MG/1
20 TABLET, FILM COATED ORAL DAILY
Status: DISCONTINUED | OUTPATIENT
Start: 2021-03-21 | End: 2021-03-25 | Stop reason: HOSPADM

## 2021-03-21 RX ORDER — BUDESONIDE AND FORMOTEROL FUMARATE DIHYDRATE 160; 4.5 UG/1; UG/1
2 AEROSOL RESPIRATORY (INHALATION) 2 TIMES DAILY
Status: DISCONTINUED | OUTPATIENT
Start: 2021-03-21 | End: 2021-03-21 | Stop reason: ALTCHOICE

## 2021-03-21 RX ORDER — SODIUM CHLORIDE 9 MG/ML
INJECTION, SOLUTION INTRAVENOUS CONTINUOUS
Status: DISCONTINUED | OUTPATIENT
Start: 2021-03-21 | End: 2021-03-21

## 2021-03-21 RX ORDER — ACETAMINOPHEN 325 MG/1
650 TABLET ORAL EVERY 6 HOURS PRN
Status: DISCONTINUED | OUTPATIENT
Start: 2021-03-21 | End: 2021-03-25 | Stop reason: HOSPADM

## 2021-03-21 RX ORDER — NICOTINE POLACRILEX 4 MG
15 LOZENGE BUCCAL PRN
Status: DISCONTINUED | OUTPATIENT
Start: 2021-03-21 | End: 2021-03-25 | Stop reason: HOSPADM

## 2021-03-21 RX ORDER — ISOSORBIDE MONONITRATE 60 MG/1
60 TABLET, EXTENDED RELEASE ORAL DAILY
Status: DISCONTINUED | OUTPATIENT
Start: 2021-03-21 | End: 2021-03-22

## 2021-03-21 RX ORDER — IPRATROPIUM BROMIDE AND ALBUTEROL SULFATE 2.5; .5 MG/3ML; MG/3ML
1 SOLUTION RESPIRATORY (INHALATION) EVERY 4 HOURS PRN
Status: DISCONTINUED | OUTPATIENT
Start: 2021-03-21 | End: 2021-03-25 | Stop reason: HOSPADM

## 2021-03-21 RX ORDER — DEXTROSE MONOHYDRATE 25 G/50ML
12.5 INJECTION, SOLUTION INTRAVENOUS PRN
Status: DISCONTINUED | OUTPATIENT
Start: 2021-03-21 | End: 2021-03-25 | Stop reason: HOSPADM

## 2021-03-21 RX ORDER — SODIUM CHLORIDE 0.9 % (FLUSH) 0.9 %
10 SYRINGE (ML) INJECTION PRN
Status: DISCONTINUED | OUTPATIENT
Start: 2021-03-21 | End: 2021-03-25 | Stop reason: HOSPADM

## 2021-03-21 RX ORDER — AMMONIUM LACTATE 12 G/100G
LOTION TOPICAL 2 TIMES DAILY PRN
Status: DISCONTINUED | OUTPATIENT
Start: 2021-03-22 | End: 2021-03-25 | Stop reason: HOSPADM

## 2021-03-21 RX ORDER — POLYVINYL ALCOHOL 14 MG/ML
1 SOLUTION/ DROPS OPHTHALMIC PRN
Status: DISCONTINUED | OUTPATIENT
Start: 2021-03-21 | End: 2021-03-25 | Stop reason: HOSPADM

## 2021-03-21 RX ORDER — MIRTAZAPINE 15 MG/1
15 TABLET, FILM COATED ORAL NIGHTLY
Status: ON HOLD | COMMUNITY
End: 2021-03-24 | Stop reason: HOSPADM

## 2021-03-21 RX ORDER — INSULIN GLARGINE 100 [IU]/ML
43 INJECTION, SOLUTION SUBCUTANEOUS NIGHTLY
Status: DISCONTINUED | OUTPATIENT
Start: 2021-03-21 | End: 2021-03-25 | Stop reason: HOSPADM

## 2021-03-21 RX ORDER — BUMETANIDE 0.25 MG/ML
1 INJECTION, SOLUTION INTRAMUSCULAR; INTRAVENOUS ONCE
Status: COMPLETED | OUTPATIENT
Start: 2021-03-21 | End: 2021-03-21

## 2021-03-21 RX ORDER — OXYCODONE HYDROCHLORIDE AND ACETAMINOPHEN 5; 325 MG/1; MG/1
1 TABLET ORAL EVERY 4 HOURS PRN
Status: DISCONTINUED | OUTPATIENT
Start: 2021-03-21 | End: 2021-03-25 | Stop reason: HOSPADM

## 2021-03-21 RX ADMIN — BUDESONIDE 500 MCG: 0.5 SUSPENSION RESPIRATORY (INHALATION) at 21:12

## 2021-03-21 RX ADMIN — DEXTROSE MONOHYDRATE 12.5 G: 25 INJECTION, SOLUTION INTRAVENOUS at 18:41

## 2021-03-21 RX ADMIN — Medication 10 ML: at 20:08

## 2021-03-21 RX ADMIN — SODIUM CHLORIDE: 9 INJECTION, SOLUTION INTRAVENOUS at 13:27

## 2021-03-21 RX ADMIN — MICONAZOLE NITRATE: 2 POWDER TOPICAL at 20:24

## 2021-03-21 RX ADMIN — DEXTROSE MONOHYDRATE: 50 INJECTION, SOLUTION INTRAVENOUS at 20:06

## 2021-03-21 RX ADMIN — ARFORMOTEROL TARTRATE 15 MCG: 15 SOLUTION RESPIRATORY (INHALATION) at 21:13

## 2021-03-21 RX ADMIN — ENOXAPARIN SODIUM 120 MG: 120 INJECTION SUBCUTANEOUS at 20:24

## 2021-03-21 RX ADMIN — BUMETANIDE 1 MG: 0.25 INJECTION, SOLUTION INTRAMUSCULAR; INTRAVENOUS at 17:07

## 2021-03-21 RX ADMIN — GLUCAGON HYDROCHLORIDE 1 MG: KIT at 13:27

## 2021-03-21 RX ADMIN — DEXTROSE MONOHYDRATE 12.5 G: 25 INJECTION, SOLUTION INTRAVENOUS at 18:38

## 2021-03-21 ASSESSMENT — PAIN SCALES - GENERAL: PAINLEVEL_OUTOF10: 0

## 2021-03-21 NOTE — PROGRESS NOTES
Per [facility] paperwork:    Pt may be up as tolerated with 1 point cane  Pt refuses flu vaccine every year  Pt wears Bipap HS & PRN, settings 12/6    Home med rec changes were made, patient takes medications that were not on list

## 2021-03-21 NOTE — PROGRESS NOTES
Patient discussed with Dr. Susan Rosen. Patient presented to ER due to acute hypoxic respiratory failure. Found to have acute CHF and elevated troponin. Orders placed. Dr. Cami Panda to see in Am.      Rose Marie Kaur, DO

## 2021-03-21 NOTE — ED PROVIDER NOTES
Department of Emergency Medicine   ED  Provider Note  Admit Date/RoomTime: 3/21/2021 11:29 AM  ED Room: 0610/0610-A          History of Present Illness:  3/21/21, Time: 11:41 AM EDT  Chief Complaint   Patient presents with    Altered Mental Status     From lashaun, BS-60 1 amp given                Sylvester Killian is a 66 y.o. male presenting to the ED for mental status changes from nursing facility, beginning a few days ago, worse today. The complaint has been persistent, moderate in severity, and worsened by nothing. Patient presents for altered mental status from nursing facility. Ongoing for the last several days. DO NOT RESUSCITATE comfort care, increasing confusion last week. Has been moaning more, fluctuating blood sugars at facility, received glucagon and D50 in the last 24 hours. Had a fall last week, recently was taken off his Exelon patch. They report he had a fall last week. EMS was called today, he had an episode of hypoglycemia was 60 at EMS arrival given D50.  199 just prior to arrival.  He was minimally responsive was given Narcan without change. He is arousable to verbal stimulation. Will answer some questions but is very altered. He was 86% for them on room air placed on 4 L nasal cannula    Review of Systems:   Pertinent positives and negatives are stated within HPI, all other systems reviewed and are negative.        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     Past Surgical History:  has a past surgical history that includes Cardiac surgery; Coronary angioplasty with stent (12/2011); knee surgery; other surgical history (Left, 2/11/2015); other surgical history (3/2/15); other surgical history (Left, 3/2/2015); and eye surgery (Right, 8-). Social History:  reports that he quit smoking about 3 months ago. His smoking use included cigarettes. He started smoking about 45 years ago. He has a 22.00 pack-year smoking history. He has never used smokeless tobacco. He reports that he does not drink alcohol or use drugs. Family History: family history includes Cancer in his mother; Heart Disease in his father. . Unless otherwise noted, family history is non contributory    The patients home medications have been reviewed. Allergies: Patient has no known allergies. ---------------------------------------------------PHYSICAL EXAM--------------------------------------    Constitutional/General: Somnolent on arrival but does arouse to verbal stimulation. Head: Prominence of the right frontal bone. There is no evidence of acute trauma and atraumatic  Eyes: Pupils are 3 mm reactive bilaterally. There is no nystagmus  Mouth: Oropharynx clear, handling secretions, no trismus, no asymmetry of the posterior oropharynx or uvular edema  Neck: Supple, full ROM, no stridor, no meningeal signs  Respiratory: Tachypnea with diminished lung sounds throughout,Not in respiratory distress  Cardiovascular: Tachycardic and regular regular 2+ distal pulses. Equal extremity pulses. Chest: No chest wall tenderness  GI:  Abdomen Soft, Non tender, Non distended. No rebound, guarding, or rigidity currently in a diaper. He does not have dorsal erosion  Musculoskeletal: Moves all extremities x 4. Warm and well perfused, no clubbing, cyanosis, there is edema of the lower extremities capillary refill <3 seconds  Integument: skin warm and dry. No rashes.    Neurologic: Glascow Coma Scale  Best Eye Response 3 - Opens eyes to loud noise or command   Best Verbal Response 4 - Seems confused, disoriented   Best Motor Response 5 - Pushes away noxious stimulus   Total 12           EKG: Interpreted by emergency department physician, Dr. Madelaine Bradley   This EKG is signed and interpreted by me. Rate: 102  Rhythm: Atrial fibrillation  Interpretation: Atrial fibrillation by ventricular spots, right axis, right bundle branch block, QRS is 104, QTc is 500. Nonspecific T changes with depression in lateral leads, no evidence of ST elevation  Comparison: Appears generally stable compared to 12/16/2020 with the exception of QT has gone from 4     PROCEDURE  3/21/21       Time: Layo 58 INSERTION  Risks, benefits and alternatives (for applicable procedures below) described. Performed By: Alethia Saint, DO. Indication: renal insufficiency 10 urine for diagnostic testing and nursing unable to pass Parnell  Informed consent obtained: Unable to be obtained due to patient's condition. .  Procedure:  A 10F sized Parnell catheter was passed with success and  60 ml of urine was removed. The catheter was left in place. Patient tolerated the procedure with difficulty. Complications:  None.         -------------------------------------------------- RESULTS -------------------------------------------------  I have personally reviewed all laboratory and imaging results for this patient. Results are listed below.      LABS: (Lab results interpreted by me)  Results for orders placed or performed during the hospital encounter of 03/21/21   Culture, Blood 1    Specimen: Blood   Result Value Ref Range    Blood Culture, Routine 24 Hours no growth    COVID-19, Rapid    Specimen: Nares   Result Value Ref Range    SARS-CoV-2, NAAT Not Detected Not Detected   Culture, Urine    Specimen: Urine, clean catch   Result Value Ref Range    Urine Culture, Routine <10,000 CFU/mL  Mixed gram positive organisms      Lactate, Sepsis   Result Value Ref Range    Lactic Acid, Sepsis 1.2 0.5 - 1.9 mmol/L   Troponin   Result Value Ref Range Troponin 0.32 (H) 0.00 - 0.03 ng/mL   CBC Auto Differential   Result Value Ref Range    WBC 14.3 (H) 4.5 - 11.5 E9/L    RBC 4.87 3.80 - 5.80 E12/L    Hemoglobin 11.9 (L) 12.5 - 16.5 g/dL    Hematocrit 40.6 37.0 - 54.0 %    MCV 83.4 80.0 - 99.9 fL    MCH 24.4 (L) 26.0 - 35.0 pg    MCHC 29.3 (L) 32.0 - 34.5 %    RDW 15.9 (H) 11.5 - 15.0 fL    Platelets 947 080 - 762 E9/L    MPV 10.5 7.0 - 12.0 fL    Neutrophils % 82.5 (H) 43.0 - 80.0 %    Immature Granulocytes % 0.6 0.0 - 5.0 %    Lymphocytes % 6.4 (L) 20.0 - 42.0 %    Monocytes % 9.7 2.0 - 12.0 %    Eosinophils % 0.4 0.0 - 6.0 %    Basophils % 0.4 0.0 - 2.0 %    Neutrophils Absolute 11.80 (H) 1.80 - 7.30 E9/L    Immature Granulocytes # 0.08 E9/L    Lymphocytes Absolute 0.91 (L) 1.50 - 4.00 E9/L    Monocytes Absolute 1.39 (H) 0.10 - 0.95 E9/L    Eosinophils Absolute 0.06 0.05 - 0.50 E9/L    Basophils Absolute 0.06 0.00 - 0.20 E9/L   Comprehensive Metabolic Panel   Result Value Ref Range    Sodium 143 132 - 146 mmol/L    Potassium 4.3 3.5 - 5.0 mmol/L    Chloride 106 98 - 107 mmol/L    CO2 28 22 - 29 mmol/L    Anion Gap 9 7 - 16 mmol/L    Glucose 93 74 - 99 mg/dL    BUN 24 (H) 8 - 23 mg/dL    CREATININE 1.2 0.7 - 1.2 mg/dL    GFR Non-African American 59 >=60 mL/min/1.73    GFR African American >60     Calcium 9.0 8.6 - 10.2 mg/dL    Total Protein 7.0 6.4 - 8.3 g/dL    Albumin 3.6 3.5 - 5.2 g/dL    Total Bilirubin 0.7 0.0 - 1.2 mg/dL    Alkaline Phosphatase 82 40 - 129 U/L    ALT 18 0 - 40 U/L    AST 26 0 - 39 U/L   Protime-INR   Result Value Ref Range    Protime 13.0 (H) 9.3 - 12.4 sec    INR 1.2    APTT   Result Value Ref Range    aPTT 35.4 (H) 24.5 - 35.1 sec   Brain Natriuretic Peptide   Result Value Ref Range    Pro-BNP 6,100 (H) 0 - 125 pg/mL   Magnesium   Result Value Ref Range    Magnesium 2.0 1.6 - 2.6 mg/dL   Urinalysis   Result Value Ref Range    Color, UA DARK YELLOW (A) Straw/Yellow    Clarity, UA Clear Clear    Glucose, Ur Negative Negative mg/dL Bilirubin Urine Negative Negative    Ketones, Urine Negative Negative mg/dL    Specific Gravity, UA >=1.030 1.005 - 1.030    Blood, Urine TRACE-INTACT Negative    pH, UA 5.0 5.0 - 9.0    Protein, UA 30 (A) Negative mg/dL    Urobilinogen, Urine 1.0 <2.0 E.U./dL    Nitrite, Urine Negative Negative    Leukocyte Esterase, Urine Negative Negative   Ammonia   Result Value Ref Range    Ammonia 33.3 16.0 - 60.0 umol/L   Procalcitonin   Result Value Ref Range    Procalcitonin 0.12 (H) 0.00 - 0.08 ng/mL   Blood Gas, Arterial   Result Value Ref Range    Date Analyzed 20210321     Time Analyzed 1344     Source: Blood Arterial     pH, Blood Gas 7.315 (L) 7.350 - 7.450    PCO2 58.5 (H) 35.0 - 45.0 mmHg    PO2 77.9 75.0 - 100.0 mmHg    HCO3 29.1 (H) 22.0 - 26.0 mmol/L    B.E. 1.8 -3.0 - 3.0 mmol/L    O2 Sat 93.8 92.0 - 98.5 %    O2Hb 92.7 (L) 94.0 - 97.0 %    COHb 1.1 0.0 - 1.5 %    MetHb 0.1 0.0 - 1.5 %    O2 Content 16.5 mL/dL    HHb 6.1 (H) 0.0 - 5.0 %    tHb (est) 12.6 11.5 - 16.5 g/dL    Mode RA     Date Of Collection      Time Collected      Pt Temp 37.0 C     ID [id number]     Lab [id number]     Critical(s) Notified .  No Critical Values    Microscopic Urinalysis   Result Value Ref Range    Hyaline Casts, UA 0-2 0 - 2 /LPF    WBC, UA NONE 0 - 5 /HPF    RBC, UA 1-3 0 - 2 /HPF    Bacteria, UA NONE SEEN None Seen /HPF   GLUCOSE, RANDOM   Result Value Ref Range    Glucose 96 74 - 99 mg/dL   Troponin   Result Value Ref Range    Troponin 0.23 (H) 0.00 - 0.03 ng/mL   Troponin   Result Value Ref Range    Troponin 0.50 (H) 0.00 - 0.03 ng/mL   CBC auto differential   Result Value Ref Range    WBC 6.4 4.5 - 11.5 E9/L    RBC 4.84 3.80 - 5.80 E12/L    Hemoglobin 11.7 (L) 12.5 - 16.5 g/dL    Hematocrit 40.7 37.0 - 54.0 %    MCV 84.1 80.0 - 99.9 fL    MCH 24.2 (L) 26.0 - 35.0 pg    MCHC 28.7 (L) 32.0 - 34.5 %    RDW 15.9 (H) 11.5 - 15.0 fL    Platelets 926 540 - 385 E9/L    MPV 10.3 7.0 - 12.0 fL    Neutrophils % 63.4 43.0 - 80.0 % Immature Granulocytes % 0.3 0.0 - 5.0 %    Lymphocytes % 18.3 (L) 20.0 - 42.0 %    Monocytes % 14.4 (H) 2.0 - 12.0 %    Eosinophils % 3.0 0.0 - 6.0 %    Basophils % 0.6 0.0 - 2.0 %    Neutrophils Absolute 4.06 1.80 - 7.30 E9/L    Immature Granulocytes # 0.02 E9/L    Lymphocytes Absolute 1.17 (L) 1.50 - 4.00 E9/L    Monocytes Absolute 0.92 0.10 - 0.95 E9/L    Eosinophils Absolute 0.19 0.05 - 0.50 E9/L    Basophils Absolute 0.04 0.00 - 0.20 E9/L    Anisocytosis 1+     Polychromasia 1+     Hypochromia 1+     Poikilocytes 3+     Barwick Cells 1+     Ovalocytes 3+    Comprehensive Metabolic Panel w/ Reflex to MG   Result Value Ref Range    Sodium 143 132 - 146 mmol/L    Potassium reflex Magnesium 4.9 3.5 - 5.0 mmol/L    Chloride 106 98 - 107 mmol/L    CO2 30 (H) 22 - 29 mmol/L    Anion Gap 7 7 - 16 mmol/L    Glucose 71 (L) 74 - 99 mg/dL    BUN 22 8 - 23 mg/dL    CREATININE 1.1 0.7 - 1.2 mg/dL    GFR Non-African American >60 >=60 mL/min/1.73    GFR African American >60     Calcium 8.7 8.6 - 10.2 mg/dL    Total Protein 6.7 6.4 - 8.3 g/dL    Albumin 3.4 (L) 3.5 - 5.2 g/dL    Total Bilirubin 0.6 0.0 - 1.2 mg/dL    Alkaline Phosphatase 82 40 - 129 U/L    ALT 19 0 - 40 U/L    AST 25 0 - 39 U/L   Hemoglobin A1C   Result Value Ref Range    Hemoglobin A1C 5.9 (H) 4.0 - 5.6 %   CBC auto differential   Result Value Ref Range    WBC 6.0 4.5 - 11.5 E9/L    RBC 4.38 3.80 - 5.80 E12/L    Hemoglobin 10.6 (L) 12.5 - 16.5 g/dL    Hematocrit 36.1 (L) 37.0 - 54.0 %    MCV 82.4 80.0 - 99.9 fL    MCH 24.2 (L) 26.0 - 35.0 pg    MCHC 29.4 (L) 32.0 - 34.5 %    RDW 15.8 (H) 11.5 - 15.0 fL    Platelets 574 731 - 336 E9/L    MPV 10.3 7.0 - 12.0 fL    Neutrophils % 54.0 43.0 - 80.0 %    Immature Granulocytes % 0.3 0.0 - 5.0 %    Lymphocytes % 27.0 20.0 - 42.0 %    Monocytes % 14.7 (H) 2.0 - 12.0 %    Eosinophils % 3.0 0.0 - 6.0 %    Basophils % 1.0 0.0 - 2.0 %    Neutrophils Absolute 3.23 1.80 - 7.30 E9/L    Immature Granulocytes # 0.02 E9/L Lymphocytes Absolute 1.62 1.50 - 4.00 E9/L    Monocytes Absolute 0.88 0.10 - 0.95 E9/L    Eosinophils Absolute 0.18 0.05 - 0.50 E9/L    Basophils Absolute 0.06 0.00 - 0.20 E9/L   Comprehensive Metabolic Panel w/ Reflex to MG   Result Value Ref Range    Sodium 137 132 - 146 mmol/L    Potassium reflex Magnesium 4.6 3.5 - 5.0 mmol/L    Chloride 101 98 - 107 mmol/L    CO2 29 22 - 29 mmol/L    Anion Gap 7 7 - 16 mmol/L    Glucose 98 74 - 99 mg/dL    BUN 28 (H) 8 - 23 mg/dL    CREATININE 1.3 (H) 0.7 - 1.2 mg/dL    GFR Non-African American 54 >=60 mL/min/1.73    GFR African American >60     Calcium 8.1 (L) 8.6 - 10.2 mg/dL    Total Protein 5.9 (L) 6.4 - 8.3 g/dL    Albumin 3.1 (L) 3.5 - 5.2 g/dL    Total Bilirubin 0.8 0.0 - 1.2 mg/dL    Alkaline Phosphatase 86 40 - 129 U/L    ALT 18 0 - 40 U/L    AST 22 0 - 39 U/L   CBC auto differential   Result Value Ref Range    WBC 6.8 4.5 - 11.5 E9/L    RBC 4.52 3.80 - 5.80 E12/L    Hemoglobin 11.0 (L) 12.5 - 16.5 g/dL    Hematocrit 36.0 (L) 37.0 - 54.0 %    MCV 79.6 (L) 80.0 - 99.9 fL    MCH 24.3 (L) 26.0 - 35.0 pg    MCHC 30.6 (L) 32.0 - 34.5 %    RDW 15.3 (H) 11.5 - 15.0 fL    Platelets 393 346 - 072 E9/L    MPV 10.2 7.0 - 12.0 fL    Neutrophils % 55.3 43.0 - 80.0 %    Immature Granulocytes % 0.1 0.0 - 5.0 %    Lymphocytes % 28.7 20.0 - 42.0 %    Monocytes % 13.0 (H) 2.0 - 12.0 %    Eosinophils % 1.9 0.0 - 6.0 %    Basophils % 1.0 0.0 - 2.0 %    Neutrophils Absolute 3.72 1.80 - 7.30 E9/L    Immature Granulocytes # 0.01 E9/L    Lymphocytes Absolute 1.94 1.50 - 4.00 E9/L    Monocytes Absolute 0.88 0.10 - 0.95 E9/L    Eosinophils Absolute 0.13 0.05 - 0.50 E9/L    Basophils Absolute 0.07 0.00 - 0.20 E9/L   Comprehensive Metabolic Panel w/ Reflex to MG   Result Value Ref Range    Sodium 135 132 - 146 mmol/L    Potassium reflex Magnesium 4.2 3.5 - 5.0 mmol/L    Chloride 96 (L) 98 - 107 mmol/L    CO2 29 22 - 29 mmol/L    Anion Gap 10 7 - 16 mmol/L    Glucose 132 (H) 74 - 99 mg/dL    BUN 22 8 - 23 mg/dL    CREATININE 1.1 0.7 - 1.2 mg/dL    GFR Non-African American >60 >=60 mL/min/1.73    GFR African American >60     Calcium 8.2 (L) 8.6 - 10.2 mg/dL    Total Protein 6.1 (L) 6.4 - 8.3 g/dL    Albumin 3.0 (L) 3.5 - 5.2 g/dL    Total Bilirubin 0.9 0.0 - 1.2 mg/dL    Alkaline Phosphatase 96 40 - 129 U/L    ALT 18 0 - 40 U/L    AST 20 0 - 39 U/L   CBC auto differential   Result Value Ref Range    WBC 6.3 4.5 - 11.5 E9/L    RBC 4.71 3.80 - 5.80 E12/L    Hemoglobin 11.3 (L) 12.5 - 16.5 g/dL    Hematocrit 37.8 37.0 - 54.0 %    MCV 80.3 80.0 - 99.9 fL    MCH 24.0 (L) 26.0 - 35.0 pg    MCHC 29.9 (L) 32.0 - 34.5 %    RDW 15.3 (H) 11.5 - 15.0 fL    Platelets 836 259 - 906 E9/L    MPV 10.4 7.0 - 12.0 fL    Neutrophils % 57.1 43.0 - 80.0 %    Immature Granulocytes % 0.2 0.0 - 5.0 %    Lymphocytes % 27.7 20.0 - 42.0 %    Monocytes % 11.4 2.0 - 12.0 %    Eosinophils % 2.5 0.0 - 6.0 %    Basophils % 1.1 0.0 - 2.0 %    Neutrophils Absolute 3.60 1.80 - 7.30 E9/L    Immature Granulocytes # 0.01 E9/L    Lymphocytes Absolute 1.75 1.50 - 4.00 E9/L    Monocytes Absolute 0.72 0.10 - 0.95 E9/L    Eosinophils Absolute 0.16 0.05 - 0.50 E9/L    Basophils Absolute 0.07 0.00 - 0.20 E9/L   Comprehensive Metabolic Panel w/ Reflex to MG   Result Value Ref Range    Sodium 140 132 - 146 mmol/L    Potassium reflex Magnesium 4.2 3.5 - 5.0 mmol/L    Chloride 101 98 - 107 mmol/L    CO2 31 (H) 22 - 29 mmol/L    Anion Gap 8 7 - 16 mmol/L    Glucose 117 (H) 74 - 99 mg/dL    BUN 19 8 - 23 mg/dL    CREATININE 1.1 0.7 - 1.2 mg/dL    GFR Non-African American >60 >=60 mL/min/1.73    GFR African American >60     Calcium 8.3 (L) 8.6 - 10.2 mg/dL    Total Protein 6.3 (L) 6.4 - 8.3 g/dL    Albumin 3.4 (L) 3.5 - 5.2 g/dL    Total Bilirubin 0.8 0.0 - 1.2 mg/dL    Alkaline Phosphatase 111 40 - 129 U/L    ALT 28 0 - 40 U/L    AST 36 0 - 39 U/L   POCT glucose   Result Value Ref Range    Glucose 96 mg/dL    QC OK? ok    POCT Glucose   Result Value Ref Range Meter Glucose 96 74 - 99 mg/dL   POCT Glucose   Result Value Ref Range    Meter Glucose <40 (L) 74 - 99 mg/dL   POCT Glucose   Result Value Ref Range    Meter Glucose 128 (H) 74 - 99 mg/dL   POCT Glucose   Result Value Ref Range    Meter Glucose 77 74 - 99 mg/dL   POCT Glucose   Result Value Ref Range    Meter Glucose 93 74 - 99 mg/dL   POCT Glucose   Result Value Ref Range    Meter Glucose 75 74 - 99 mg/dL   POCT Glucose   Result Value Ref Range    Meter Glucose 73 (L) 74 - 99 mg/dL   POCT Glucose   Result Value Ref Range    Meter Glucose 75 74 - 99 mg/dL   POCT Glucose   Result Value Ref Range    Meter Glucose 74 74 - 99 mg/dL   POCT Glucose   Result Value Ref Range    Meter Glucose 73 (L) 74 - 99 mg/dL   POCT Glucose   Result Value Ref Range    Meter Glucose 83 74 - 99 mg/dL   POCT Glucose   Result Value Ref Range    Meter Glucose 77 74 - 99 mg/dL   POCT Glucose   Result Value Ref Range    Meter Glucose 90 74 - 99 mg/dL   POCT Glucose   Result Value Ref Range    Meter Glucose 123 (H) 74 - 99 mg/dL   POCT Glucose   Result Value Ref Range    Meter Glucose 101 (H) 74 - 99 mg/dL   POCT Glucose   Result Value Ref Range    Meter Glucose 95 74 - 99 mg/dL   POCT Glucose   Result Value Ref Range    Meter Glucose 98 74 - 99 mg/dL   POCT Glucose   Result Value Ref Range    Meter Glucose 138 (H) 74 - 99 mg/dL   POCT Glucose   Result Value Ref Range    Meter Glucose 109 (H) 74 - 99 mg/dL   POCT Glucose   Result Value Ref Range    Meter Glucose 119 (H) 74 - 99 mg/dL   POCT Glucose   Result Value Ref Range    Meter Glucose 154 (H) 74 - 99 mg/dL   POCT Glucose   Result Value Ref Range    Meter Glucose 179 (H) 74 - 99 mg/dL   POCT Glucose   Result Value Ref Range    Meter Glucose 118 (H) 74 - 99 mg/dL   POCT Glucose   Result Value Ref Range    Meter Glucose 117 (H) 74 - 99 mg/dL   EKG 12 Lead   Result Value Ref Range    Ventricular Rate 102 BPM    Atrial Rate 98 BPM    QRS Duration 140 ms    Q-T Interval 384 ms    QTc Calculation (Bazett) 500 ms    R Axis -11 degrees    T Axis -24 degrees   ,       RADIOLOGY:  Interpreted by Radiologist unless otherwise specified  MRI BRAIN WO CONTRAST   Final Result   1. No acute intracranial abnormality. 2. Small chronic left occipital lobe infarction. 3. 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 NM Cardiac Stress Test Nuclear Imaging   Final Result      The myocardial perfusion imaging was abnormal      The abnormality was a fixed inferior and inferoapical defect without   reversibility. Overall left ventricular systolic function was normal, EF 58%      Intermediate risk myocardial perfusion study. No prior stress test is available for comparison. Trisha Gonzales MD, [address]                  US DUP LOWER EXTREMITY RIGHT LOLA   Final Result   No evidence of DVT in the right lower extremity. FL MODIFIED BARIUM SWALLOW W VIDEO   Final Result   Mildly impaired swallowing mechanism including vallecular retention of   barium. No barium aspiration. Please see separate speech pathology report for full discussion of findings   and recommendations. CT CERVICAL SPINE WO CONTRAST   Final Result   No acute abnormality of the cervical spine. CT HEAD WO CONTRAST   Final Result   No acute intracranial abnormality. No intracranial hemorrhage. XR CHEST PORTABLE   Final Result   Bilateral airspace opacities may represent pulmonary edema or diffuse   atelectasis. Repeat chest radiograph may be obtained if clinically warranted.                           ------------------------- NURSING NOTES AND VITALS REVIEWED ---------------------------   The nursing notes within the ED encounter and vital signs as below have been reviewed by myself  /62   Pulse 64   Temp 98.2 °F (36.8 °C) (Oral)   Resp 19   Ht 5' 7\" (1.702 m)   Wt 248 lb 12.8 oz (112.9 kg)   SpO2 96%   BMI 38.97 kg/m² Oxygen Saturation Interpretation: Abnormal    The cardiac monitor revealed A fib with a heart rate in the 100s as interpreted by me. The cardiac monitor was ordered secondary to the patient's heart rate and to monitor the patient for dysrhythmia. CPT 01046    The patients available past medical records and past encounters were reviewed.         ------------------------------ ED COURSE/MEDICAL DECISION MAKING----------------------  Medications   sodium chloride flush 0.9 % injection 10 mL (10 mLs Intravenous Given 3/25/21 1143)   sodium chloride flush 0.9 % injection 10 mL (10 mLs Intravenous Given 3/22/21 1836)   potassium chloride (KLOR-CON M) extended release tablet 40 mEq (has no administration in time range)     Or   potassium bicarb-citric acid (EFFER-K) effervescent tablet 40 mEq (has no administration in time range)     Or   potassium chloride 10 mEq/100 mL IVPB (Peripheral Line) (has no administration in time range)   enoxaparin (LOVENOX) injection 120 mg (120 mg Subcutaneous Given 3/25/21 1142)   senna (SENOKOT) tablet 8.6 mg (has no administration in time range)   acetaminophen (TYLENOL) tablet 650 mg (has no administration in time range)     Or   acetaminophen (TYLENOL) suppository 650 mg (has no administration in time range)   aspirin EC tablet 325 mg (325 mg Oral Given 3/25/21 1142)   atorvastatin (LIPITOR) tablet 20 mg (20 mg Oral Given 3/25/21 1142)   citalopram (CELEXA) tablet 10 mg ( Oral Automatically Held 3/30/21 0900)   clopidogrel (PLAVIX) tablet 75 mg (75 mg Oral Given 3/25/21 1142)   insulin glargine (LANTUS) injection vial 43 Units ( Subcutaneous Automatically Held 3/30/21 2100)   levothyroxine (SYNTHROID) tablet 150 mcg (150 mcg Oral Given 3/25/21 0619)   montelukast (SINGULAIR) tablet 10 mg (10 mg Oral Given 3/24/21 1935)   antioxidant multivitamin (OCUVITE) tablet (1 tablet Oral Given 3/25/21 1142)   oxyCODONE-acetaminophen (PERCOCET) 5-325 MG per tablet 1 tablet (has no administration in time range)   pantoprazole (PROTONIX) tablet 40 mg (40 mg Oral Given 3/25/21 0618)   polyvinyl alcohol (LIQUIFILM TEARS) 1.4 % ophthalmic solution 1 drop (has no administration in time range)   insulin lispro (HUMALOG) injection vial 0-6 Units (0 Units Subcutaneous Not Given 3/25/21 0622)   insulin lispro (HUMALOG) injection vial 0-3 Units (0 Units Subcutaneous Not Given 3/24/21 2221)   glucose (GLUTOSE) 40 % oral gel 15 g (has no administration in time range)   dextrose 50 % IV solution (12.5 g Intravenous Given 3/21/21 1841)   glucagon (rDNA) injection 1 mg (has no administration in time range)   dextrose 5 % solution (0 mL/hr Intravenous Stopped 3/24/21 0629)   ipratropium-albuterol (DUONEB) nebulizer solution 1 ampule (has no administration in time range)   Arformoterol Tartrate (BROVANA) nebulizer solution 15 mcg (15 mcg Nebulization Not Given 3/25/21 0953)     And   budesonide (PULMICORT) nebulizer suspension 500 mcg (500 mcg Nebulization Not Given 3/25/21 0954)   miconazole (MICOTIN) 2 % powder ( Topical Given 3/25/21 1143)   ammonium lactate (LAC-HYDRIN) 12 % lotion ( Topical Given 3/25/21 1143)   furosemide (LASIX) tablet 20 mg (20 mg Oral Given 3/25/21 1142)   glucagon (rDNA) injection 1 mg (1 mg Intramuscular Given 3/21/21 1327)   bumetanide (BUMEX) injection 1 mg (1 mg Intravenous Given 3/21/21 1707)   perflutren lipid microspheres (DEFINITY) injection 1.65 mg (1.65 mg Intravenous Given 3/22/21 0845)   regadenoson (LEXISCAN) injection 0.4 mg (0.4 mg Intravenous Given 3/23/21 1010)   barium sulfate 40 % reconsitutable suspension (70 g Oral Given 3/22/21 1523)   barium sulfate 40 % suspension 120 mL (120 mLs Oral Given 3/22/21 1523)   barium sulfate 40 % paste 10 mL (10 mLs Oral Given 3/22/21 1522)   technetium sestamibi (CARDIOLITE) injection 30 millicurie (30 millicuries Intravenous Given 3/23/21 0842)   technetium sestamibi (CARDIOLITE) injection 10 millicurie (10 millicuries Intravenous Given 3/23/21 0842)                    Medical Decision Making:     I, Dr. Amy Bonilla am the primary provider of record    Patient with hypoxia, evidence of acutely decompensated heart failure. No acute infectious process identified. No other reason to explain the patient's hypoglycemia and hypoxia. Spoke with medicine patient will be admitted      Re-Evaluations:          Re-evaluation. Patients symptoms show no change  Repeat physical examination is not changed      Re-evaluation. Patients symptoms show no change  Repeat physical examination is not changed        This patient's ED course included: a personal history and physicial examination, multiple bedside re-evaluations, IV medications, cardiac monitoring, continuous pulse oximetry and complex medical decision making and emergency management    This patient has been closely monitored during their ED course. Consultations:  Internal med      Critical Care: Please note that the withdrawal or failure to initiate urgent interventions for this patient would likely result in a life threatening deterioration or permanent disability. Accordingly this patient received 31 minutes of critical care time, excluding separately billable procedures. Counseling: The emergency provider has spoken with the patient and discussed todays results, in addition to providing specific details for the plan of care and counseling regarding the diagnosis and prognosis. Questions are answered at this time and they are agreeable with the plan.       --------------------------------- IMPRESSION AND DISPOSITION ---------------------------------    IMPRESSION  1. Acute on chronic congestive heart failure, unspecified heart failure type ([facility] [address]        DISPOSITION  Disposition: Admit  Patient condition is stable        NOTE: This report was transcribed using voice recognition software.  Every effort was made to ensure accuracy; however, inadvertent computerized transcription errors may be present       Sameer Rojas DO  03/25/21 1534

## 2021-03-22 ENCOUNTER — APPOINTMENT (OUTPATIENT)
Dept: ULTRASOUND IMAGING | Age: 71
DRG: 280 | End: 2021-03-22
Payer: MEDICARE

## 2021-03-22 ENCOUNTER — APPOINTMENT (OUTPATIENT)
Dept: GENERAL RADIOLOGY | Age: 71
DRG: 280 | End: 2021-03-22
Payer: MEDICARE

## 2021-03-22 PROBLEM — G51.0 BELL'S PALSY: Status: RESOLVED | Noted: 2020-09-24 | Resolved: 2021-03-22

## 2021-03-22 PROBLEM — I21.4 NSTEMI (NON-ST ELEVATED MYOCARDIAL INFARCTION) (HCC): Status: ACTIVE | Noted: 2021-03-22

## 2021-03-22 PROBLEM — U07.1 PNEUMONIA DUE TO COVID-19 VIRUS: Status: RESOLVED | Noted: 2020-12-14 | Resolved: 2021-03-22

## 2021-03-22 PROBLEM — J12.82 PNEUMONIA DUE TO COVID-19 VIRUS: Status: RESOLVED | Noted: 2020-12-14 | Resolved: 2021-03-22

## 2021-03-22 PROBLEM — R29.810 FACIAL DROOP: Status: RESOLVED | Noted: 2020-09-24 | Resolved: 2021-03-22

## 2021-03-22 LAB
ALBUMIN SERPL-MCNC: 3.4 G/DL (ref 3.5–5.2)
ALP BLD-CCNC: 82 U/L (ref 40–129)
ALT SERPL-CCNC: 19 U/L (ref 0–40)
ANION GAP SERPL CALCULATED.3IONS-SCNC: 7 MMOL/L (ref 7–16)
ANISOCYTOSIS: ABNORMAL
AST SERPL-CCNC: 25 U/L (ref 0–39)
BASOPHILS ABSOLUTE: 0.04 E9/L (ref 0–0.2)
BASOPHILS RELATIVE PERCENT: 0.6 % (ref 0–2)
BILIRUB SERPL-MCNC: 0.6 MG/DL (ref 0–1.2)
BUN BLDV-MCNC: 22 MG/DL (ref 8–23)
BURR CELLS: ABNORMAL
CALCIUM SERPL-MCNC: 8.7 MG/DL (ref 8.6–10.2)
CHLORIDE BLD-SCNC: 106 MMOL/L (ref 98–107)
CO2: 30 MMOL/L (ref 22–29)
CREAT SERPL-MCNC: 1.1 MG/DL (ref 0.7–1.2)
EKG ATRIAL RATE: 98 BPM
EKG Q-T INTERVAL: 384 MS
EKG QRS DURATION: 140 MS
EKG QTC CALCULATION (BAZETT): 500 MS
EKG R AXIS: -11 DEGREES
EKG T AXIS: -24 DEGREES
EKG VENTRICULAR RATE: 102 BPM
EOSINOPHILS ABSOLUTE: 0.19 E9/L (ref 0.05–0.5)
EOSINOPHILS RELATIVE PERCENT: 3 % (ref 0–6)
GFR AFRICAN AMERICAN: >60
GFR NON-AFRICAN AMERICAN: >60 ML/MIN/1.73
GLUCOSE BLD-MCNC: 71 MG/DL (ref 74–99)
HCT VFR BLD CALC: 40.7 % (ref 37–54)
HEMOGLOBIN: 11.7 G/DL (ref 12.5–16.5)
HYPOCHROMIA: ABNORMAL
IMMATURE GRANULOCYTES #: 0.02 E9/L
IMMATURE GRANULOCYTES %: 0.3 % (ref 0–5)
LV EF: 60 %
LVEF MODALITY: NORMAL
LYMPHOCYTES ABSOLUTE: 1.17 E9/L (ref 1.5–4)
LYMPHOCYTES RELATIVE PERCENT: 18.3 % (ref 20–42)
MCH RBC QN AUTO: 24.2 PG (ref 26–35)
MCHC RBC AUTO-ENTMCNC: 28.7 % (ref 32–34.5)
MCV RBC AUTO: 84.1 FL (ref 80–99.9)
METER GLUCOSE: 101 MG/DL (ref 74–99)
METER GLUCOSE: 123 MG/DL (ref 74–99)
METER GLUCOSE: 73 MG/DL (ref 74–99)
METER GLUCOSE: 73 MG/DL (ref 74–99)
METER GLUCOSE: 74 MG/DL (ref 74–99)
METER GLUCOSE: 75 MG/DL (ref 74–99)
METER GLUCOSE: 75 MG/DL (ref 74–99)
METER GLUCOSE: 77 MG/DL (ref 74–99)
METER GLUCOSE: 83 MG/DL (ref 74–99)
METER GLUCOSE: 90 MG/DL (ref 74–99)
MONOCYTES ABSOLUTE: 0.92 E9/L (ref 0.1–0.95)
MONOCYTES RELATIVE PERCENT: 14.4 % (ref 2–12)
NEUTROPHILS ABSOLUTE: 4.06 E9/L (ref 1.8–7.3)
NEUTROPHILS RELATIVE PERCENT: 63.4 % (ref 43–80)
OVALOCYTES: ABNORMAL
PDW BLD-RTO: 15.9 FL (ref 11.5–15)
PLATELET # BLD: 292 E9/L (ref 130–450)
PMV BLD AUTO: 10.3 FL (ref 7–12)
POIKILOCYTES: ABNORMAL
POLYCHROMASIA: ABNORMAL
POTASSIUM REFLEX MAGNESIUM: 4.9 MMOL/L (ref 3.5–5)
RBC # BLD: 4.84 E12/L (ref 3.8–5.8)
SODIUM BLD-SCNC: 143 MMOL/L (ref 132–146)
TOTAL PROTEIN: 6.7 G/DL (ref 6.4–8.3)
TROPONIN: 0.5 NG/ML (ref 0–0.03)
WBC # BLD: 6.4 E9/L (ref 4.5–11.5)

## 2021-03-22 PROCEDURE — 6360000002 HC RX W HCPCS: Performed by: INTERNAL MEDICINE

## 2021-03-22 PROCEDURE — 94660 CPAP INITIATION&MGMT: CPT

## 2021-03-22 PROCEDURE — 2700000000 HC OXYGEN THERAPY PER DAY

## 2021-03-22 PROCEDURE — 82962 GLUCOSE BLOOD TEST: CPT

## 2021-03-22 PROCEDURE — 2580000003 HC RX 258: Performed by: INTERNAL MEDICINE

## 2021-03-22 PROCEDURE — 93971 EXTREMITY STUDY: CPT

## 2021-03-22 PROCEDURE — 84484 ASSAY OF TROPONIN QUANT: CPT

## 2021-03-22 PROCEDURE — 6370000000 HC RX 637 (ALT 250 FOR IP): Performed by: INTERNAL MEDICINE

## 2021-03-22 PROCEDURE — 74230 X-RAY XM SWLNG FUNCJ C+: CPT

## 2021-03-22 PROCEDURE — 6360000004 HC RX CONTRAST MEDICATION: Performed by: INTERNAL MEDICINE

## 2021-03-22 PROCEDURE — 92526 ORAL FUNCTION THERAPY: CPT | Performed by: SPEECH-LANGUAGE PATHOLOGIST

## 2021-03-22 PROCEDURE — 93010 ELECTROCARDIOGRAM REPORT: CPT | Performed by: INTERNAL MEDICINE

## 2021-03-22 PROCEDURE — 94640 AIRWAY INHALATION TREATMENT: CPT

## 2021-03-22 PROCEDURE — 85025 COMPLETE CBC W/AUTO DIFF WBC: CPT

## 2021-03-22 PROCEDURE — 2500000003 HC RX 250 WO HCPCS: Performed by: INTERNAL MEDICINE

## 2021-03-22 PROCEDURE — 80053 COMPREHEN METABOLIC PANEL: CPT

## 2021-03-22 PROCEDURE — 92610 EVALUATE SWALLOWING FUNCTION: CPT | Performed by: SPEECH-LANGUAGE PATHOLOGIST

## 2021-03-22 PROCEDURE — 93306 TTE W/DOPPLER COMPLETE: CPT

## 2021-03-22 PROCEDURE — 36415 COLL VENOUS BLD VENIPUNCTURE: CPT

## 2021-03-22 PROCEDURE — 2060000000 HC ICU INTERMEDIATE R&B

## 2021-03-22 PROCEDURE — 92611 MOTION FLUOROSCOPY/SWALLOW: CPT | Performed by: SPEECH-LANGUAGE PATHOLOGIST

## 2021-03-22 RX ORDER — FUROSEMIDE 10 MG/ML
40 INJECTION INTRAMUSCULAR; INTRAVENOUS 2 TIMES DAILY
Status: DISCONTINUED | OUTPATIENT
Start: 2021-03-22 | End: 2021-03-23

## 2021-03-22 RX ORDER — DEXTROSE MONOHYDRATE 100 MG/ML
INJECTION, SOLUTION INTRAVENOUS CONTINUOUS
Status: DISCONTINUED | OUTPATIENT
Start: 2021-03-22 | End: 2021-03-24

## 2021-03-22 RX ADMIN — FUROSEMIDE 40 MG: 10 INJECTION, SOLUTION INTRAMUSCULAR; INTRAVENOUS at 12:37

## 2021-03-22 RX ADMIN — ATORVASTATIN CALCIUM 20 MG: 40 TABLET, FILM COATED ORAL at 17:21

## 2021-03-22 RX ADMIN — FUROSEMIDE 40 MG: 10 INJECTION, SOLUTION INTRAMUSCULAR; INTRAVENOUS at 18:35

## 2021-03-22 RX ADMIN — MICONAZOLE NITRATE: 2 POWDER TOPICAL at 20:07

## 2021-03-22 RX ADMIN — MICONAZOLE NITRATE: 2 POWDER TOPICAL at 09:50

## 2021-03-22 RX ADMIN — MONTELUKAST SODIUM 10 MG: 10 TABLET, FILM COATED ORAL at 20:07

## 2021-03-22 RX ADMIN — SODIUM CHLORIDE, PRESERVATIVE FREE 10 ML: 5 INJECTION INTRAVENOUS at 12:37

## 2021-03-22 RX ADMIN — ENOXAPARIN SODIUM 120 MG: 120 INJECTION SUBCUTANEOUS at 09:49

## 2021-03-22 RX ADMIN — ASPIRIN 325 MG: 325 TABLET, COATED ORAL at 17:22

## 2021-03-22 RX ADMIN — CLOPIDOGREL BISULFATE 75 MG: 75 TABLET ORAL at 17:22

## 2021-03-22 RX ADMIN — Medication 1 TABLET: at 20:07

## 2021-03-22 RX ADMIN — Medication 10 ML: at 08:40

## 2021-03-22 RX ADMIN — Medication 1 TABLET: at 17:22

## 2021-03-22 RX ADMIN — BARIUM SULFATE 10 ML: 400 PASTE ORAL at 15:22

## 2021-03-22 RX ADMIN — BARIUM SULFATE 70 G: 0.81 POWDER, FOR SUSPENSION ORAL at 15:23

## 2021-03-22 RX ADMIN — BUDESONIDE 500 MCG: 0.5 SUSPENSION RESPIRATORY (INHALATION) at 20:24

## 2021-03-22 RX ADMIN — ENOXAPARIN SODIUM 120 MG: 120 INJECTION SUBCUTANEOUS at 20:06

## 2021-03-22 RX ADMIN — Medication 10 ML: at 20:07

## 2021-03-22 RX ADMIN — ARFORMOTEROL TARTRATE 15 MCG: 15 SOLUTION RESPIRATORY (INHALATION) at 20:23

## 2021-03-22 RX ADMIN — PERFLUTREN 1.65 MG: 6.52 INJECTION, SUSPENSION INTRAVENOUS at 08:45

## 2021-03-22 RX ADMIN — SODIUM CHLORIDE, PRESERVATIVE FREE 10 ML: 5 INJECTION INTRAVENOUS at 18:36

## 2021-03-22 RX ADMIN — BARIUM SULFATE 120 ML: 400 SUSPENSION ORAL at 15:23

## 2021-03-22 NOTE — CARE COORDINATION
Social work / Discharge Planning:       COVID NEGATIVE 3/21/21. Patient is from [place] [address]. Awaiting return call from their admission liaison to determine if patient is a bed hold and if precert is needed. N-17 and ambulance form completed. Electronically signed by PIOTR Mehta on 3/22/2021 at 1:29 PM          Per admissions from [place] [address], patient is a bed hold and does not need a precert to return if he discharges before the end of the month. Therapy evaluations will be needed.    Electronically signed by PIOTR Mehta on 3/22/2021 at 2:20 PM

## 2021-03-22 NOTE — PROGRESS NOTES
SPEECH/LANGUAGE PATHOLOGY  VIDEOFLUOROSCOPIC STUDY OF SWALLOWING (MBS)      PATIENT NAME:  Radha Mckeon      :  1950      TODAY'S DATE:  3/22/2021   ROOM:  [address] [place] [address]-A    SUMMARY OF EVALUATION  View of pyriform sinuses was limited due to pt's body habitus. DYSPHAGIA DIAGNOSIS:  Mild oropharyngeal dysphagia; no aspiration or penetration observed during this study       DIET RECOMMENDATIONS:  Dysphagia 2,  Mechanical Soft (Minced & Moist) solids with  thin liquids as tolerated      FEEDING RECOMMENDATIONS:     Assistance level:  Stand by assistance is needed during all oral intake      Compensatory strategies recommended: Small bites/sips and Alternate solids and liquids    THERAPY RECOMMENDATIONS:      Dysphagia therapy is recommended 3-5 times per week for LOS or when goals are met. Pt will complete BOTR strength/ ROM exercises to reduce pharyngeal residuals and improve epiglottic inversion with moderate verbal prompts. Patient will participate in DPNS to address functional deficits identified during swallow evaluation  Ongoing meal endurance analysis to provide diet modification and compensatory strategy implementation to minimize risk of aspiration associated with PO intake                  PROCEDURE     Consistencies Administered During the Evaluation   Liquids: thin liquid and nectar thick liquid   Solids:  pureed foods and solid foods      Method of Intake:   cup, straw, spoon  Self fed, Fed by clinician      Position:   Seated, upright, Lateral plane    Current Respiratory Status   nasal canula                RESULTS     ORAL STAGE       Decreased mastication due to:  poor/missing dentition          PHARYNGEAL STAGE           ONSET TIME     Delayed initiation of the pharyngeal swallow was noted with swallow reflex triggered at the level of the pyriform sinuses for thin liquids.         PHARYNGEAL RESIDUALS          Vallecula/Pharyngeal Wall           Reduced tongue base retraction resulting in residuals in vallecula and/or posterior pharyngeal wall for solid foods which mostly cleared with cued double swallow and liquid chaser. Pyriform Sinuses      No significant residuals were noted in the pyriform sinuses, however view was limited due to pt's body habitus. LARYNGEAL PENETRATION     Laryngeal penetration was not present during this evaluation                 ASPIRATION    Aspiration was not present during this evaluation         COMPENSATORY STRATEGIES       Compensatory strategies that were beneficial included Double swallow      STRUCTURAL/FUNCTIONAL ANOMALIES       No structural/functional anomalies were noted      CERVICAL ESOPHAGEAL STAGE :        The cervical esophagus appeared adequate                            The Speech Language Pathologist (SLP) completed education with the patient regarding results of evaluation. Explained that Speech Pathology intervention is warranted  at this time   Prognosis for improvements is fair +     This plan will be re-evaluated and revised in 1 week  if warranted. Patient stated goals: Agreed with above,   Treatment goals discussed with Patient   The Patient understand(s) the diagnosis, prognosis and plan of care         INTERVENTION/EDUCATION    Pt educated on above results and plan of care with visuals. Pt trained on compensatory strategies for safe swallow with good outcome. Pt encouraged to engage in question/answer session. All questions answered and pt verbalized understanding of above. CPT code:  52786  dysphagia study, 96480 dysphagia therapy 15 mins        [x]The admitting diagnosis and active problem list, as listed below have been reviewed prior to initiation of this evaluation.      ADMITTING DIAGNOSIS: Acute on chronic congestive heart failure with left ventricular diastolic dysfunction ([facility] [address] [I50.33]     ACTIVE PROBLEM LIST:   Patient Active Problem List   Diagnosis    CAD (coronary artery disease)    Diabetes mellitus ([facility] [address]    Hyperlipidemia    DEMARCUS (obstructive sleep apnea)    Tobacco abuse    COPD [facility]ch[facility] obstructive pulmonary disease) ([facility])    ASHD (arteriosclerotic heart disease)    History of stroke    Bilateral carotid artery stenosis    Acute respiratory failure with hypoxia ([facility])    Acute on chronic congestive heart failure with left ventricular diastolic dysfunction ([facility])    NSTEMI (non-ST elevated myocardial infarction) ([facility])       OPAL Felix. Speech-Language Pathology Student    Elham LOWERY CCC/SLP [id number]  Speech-Language Pathologist

## 2021-03-22 NOTE — H&P
Internal Medicine History & Physical     Name:   : 1950  Chief Complaint: Altered Mental Status (From lashaun, BS-60 1 amp given)  Primary Care Physician: Yogesh Nolasco DO  Admission date: 3/21/2021  Date of service: 3/22/2021     History of Present Illness  Js Dooley is a 66y.o. year old male, with complex past medical history including coronary artery disease, diabetes, hyperlipidemia, DEMARCUS, COPD, CVA, CHF. Patient presented to the emergency department on 3/21 from [address] for concerns of altered mental status that had been occurring over the past few days. Was also reported the patient had fluctuating blood sugars, had a recent fall asleep. Patient was somewhat hypoglycemic on arrival in the ED with blood sugar of 60, was given D50. He was minimally responsive, arousable to verbal stimulation, 86% on room air, he was placed on nasal cannula oxygen with improvement. History from patient is severely limited at this time, he is able to occasionally answer questions appropriately. While in ED patient did have elevated troponin, elevated BNP. Head CT was negative for any acute intracranial process, chest x-ray did show bilateral airspace opacities consistent with possible pulmonary edema. Patient was given dose of Bumex. Patient is denying any chest pain at this time. The patient presents with altered mental status that has been going on for 3 days. These symptoms are moderate to severe in severity. Symptoms are made better by nothing. Symptoms are made worse by nothing. Associated symptoms include hypoxia. There are no family or friends at bedside. The history is provided by medical records, patient. Patient is felt to be a poor historian secondary to altered mental status. ED course:   Initial blood work and imaging studies performed. Admission recommended by ED physician.  Meds in ED consisted of the following: Bumex    Past Medical History:   Diagnosis Date  Arthritis     Asthma     Bilateral carotid artery stenosis 10/1/2020    CAD (coronary artery disease) 2001    FREDA to RCA    COPD (chronic obstructive pulmonary disease) (HCC)     Diabetes mellitus ([facility] [address]     Marcus's gangrene     Hyperlipidemia     Hypothyroidism     DEMARCUS (obstructive sleep apnea)     no cpap or bipap     Pneumonia 2/5/2015    Stroke [facility])        Past Surgical History:   Procedure Laterality Date    CARDIAC SURGERY      CORONARY ANGIOPLASTY WITH STENT PLACEMENT  12/2011    EYE SURGERY Right 8-    right eye cataract extraction with intraocular lens  implant    KNEE SURGERY      OTHER SURGICAL HISTORY Left 2/11/2015    excision and debridement left groin and scrotom    OTHER SURGICAL HISTORY  3/2/15    perineal wound check    OTHER SURGICAL HISTORY Left 3/2/2015    incision and drainage of scrotal abscesses and left fortino abscess by Dr Toshia Sorto       Family History   Problem Relation Age of Onset    Cancer Mother     Heart Disease Father        Social History  Patient lives [address]  Employment: none  Illicit drug use- denies  TOBACCO:   reports that he quit smoking about 3 months ago. His smoking use included cigarettes. He started smoking about 45 years ago. He has a 22.00 pack-year smoking history. He has never used smokeless tobacco.  ETOH:   reports no history of alcohol use. Home Medications  Prior to Admission medications    Medication Sig Start Date End Date Taking?  Authorizing Provider   mirtazapine (REMERON) 15 MG tablet Take 15 mg by mouth nightly   Yes Historical Provider, MD   divalproex (DEPAKOTE SPRINKLE) 125 MG capsule Take 125 mg by mouth 2 times daily   Yes Historical Provider, MD   hydrOXYzine (VISTARIL) 25 MG capsule Take 25 mg by mouth 2 times daily as needed for Itching Indications: Feeling Anxious   Yes Historical Provider, MD   albuterol sulfate (PROAIR RESPICLICK) 728 (90 Base) MCG/ACT aerosol powder inhalation Inhale into the lungs every 4 hours as needed for Wheezing or Shortness of Breath    Historical Provider, MD   pantoprazole (PROTONIX) 40 MG tablet Take 1 tablet by mouth every morning (before breakfast) 12/16/20   Dung Ayala, DO   budesonide-formoterol (SYMBICORT) 160-4.5 MCG/ACT AERO Inhale 2 puffs into the lungs 2 times daily 12/15/20   Dung Ayala, DO   albuterol-ipratropium (COMBIVENT RESPIMAT)  MCG/ACT AERS inhaler Inhale 1 puff into the lungs every 4 hours as needed for Wheezing or Shortness of Breath 12/15/20   Dung Ayala, DO   insulin aspart (NOVOLOG FLEXPEN) 100 UNIT/ML injection pen Inject 12 Units into the skin 3 times daily (before meals)    Historical Provider, MD   insulin aspart (NOVOLOG FLEXPEN) 100 UNIT/ML injection pen Inject 0-12 Units into the skin 3 times daily (before meals) 0-140= 0 units  141-180= 2 units  181-220= 4 units  221-260= 6 units  261-300= 8 units  301-340= 10 units  341+ = 12 units and notify md    Historical Provider, MD   polyvinyl alcohol (LIQUIFILM TEARS) 1.4 % ophthalmic solution Place 1 drop into the left eye as needed    Historical Provider, MD   citalopram (CELEXA) 10 MG tablet Take 10 mg by mouth daily    Historical Provider, MD   insulin glargine (LANTUS) 100 UNIT/ML injection vial Inject 43 Units into the skin nightly    Historical Provider, MD   metFORMIN (GLUCOPHAGE) 1000 MG tablet Take 1,000 mg by mouth 2 times daily (with meals)    Historical Provider, MD   levothyroxine (SYNTHROID) 175 MCG tablet Take 150 mcg by mouth Daily     Historical Provider, MD   vitamin D (ERGOCALCIFEROL) 96929 UNITS CAPS capsule Take 2,000 Units by mouth daily     Historical Provider, MD   aspirin (ECOTRIN) 325 MG EC tablet Take 325 mg by mouth daily.  Do not crush    Historical Provider, MD   oxyCODONE-acetaminophen (PERCOCET) 5-325 MG per tablet   Take 1 tablet by mouth every 4 hours as needed for Pain Instructed to take am of procedure if needed    Historical Provider, MD acetaminophen 650 MG TABS Take 650 mg by mouth every 4 hours as needed. 2/18/15   Ben Rodriguez MD   isosorbide mononitrate (IMDUR) 60 MG CR tablet Take 1 tablet by mouth daily. Patient taking differently: Take 30 mg by mouth daily  2/18/15   Ben Rodriguez MD   glucagon, rDNA, 1 MG SOLR injection Inject 1 mg into the muscle as needed for Low blood sugar (Blood glucose less than 70 mg/dL and patient NOT ALERT or NPO and does not have IV access. ). 2/18/15   Ben Rodriguez MD   glucose (GLUTOSE) 40 % GEL Take 15 g by mouth as needed. 2/18/15   Ben Rodriguez MD   magnesium hydroxide (MILK OF MAGNESIA) 400 MG/5ML suspension Take 30 mLs by mouth daily as needed for Constipation. 2/18/15   Ben Rodriguez MD   atorvastatin (LIPITOR) 20 MG tablet Take 20 mg by mouth daily. Historical Provider, MD   multivitamin (OCUVITE) TABS per tablet Take 1 tablet by mouth 2 times daily     Historical Provider, MD   montelukast (SINGULAIR) 10 MG tablet Take 1 tablet by mouth nightly. 6/7/14   Santos Finney MD   clopidogrel (PLAVIX) 75 MG tablet Take 1 tablet by mouth daily. 12/25/11 10/1/21  Ben Rodriguez MD       Allergies  No Known Allergies    Review of Systems  Please see HPI above. All bolded are positive. All un-bolded are negative.   Constitutional Symptoms: fever, chills, fatigue, generalized weakness, diaphoresis, increase in thirst, loss of appetite  Eyes: vision change   Ears, Nose, Mouth, Throat: hearing loss, nasal congestion, sores in the mouth  Cardiovascular: chest pain, chest heaviness, palpitations  Respiratory: shortness of breath, wheezing, coughing  Gastrointestinal: abdominal pain, nausea, vomiting, diarrhea, constipation, melena, hematochezia, hematemesis  Genitourinary: dysuria, hematuria, increased frequency  Musculoskeletal: lower extremity edema, myalgias, arthralgias, back pain  Integumentary: rashes, itching   Neurological: headache, lightheadedness, dizziness, confusion, syncope, numbness, tingling, focal weakness  Psychiatric: depression, suicidal ideation, anxiety  Endocrine: unintentional weight change  Hematologic/Lymphatic: lymphadenopathy, easy bruising, easy bleeding   Allergic/Immunologic: recurrent infections      Objective  VITALS:  BP (!) 122/58   Pulse 78   Temp 97.8 °F (36.6 °C) (Axillary)   Resp 16   Ht 5' 7\" (1.702 m)   Wt 250 lb (113.4 kg)   SpO2 100%   BMI 39.16 kg/m²     Physical Exam:  General: Arousable to voice, somnolent, oriented to person, place, not to time/date. Eyes: conjunctivae/corneas clear, sclera non icteric, EOMI  Ears: no obvious scars, no lesions, no masses, hearing intact  Mouth: mucous membranes moist, no obvious oral sores  Head: Prominence of right frontal bone, no ecchymoses or evidence of fracture  Neck: no JVD, no adenopathy, no thyromegaly, neck is supple, trachea is midline  Back: no CVA tenderness. Chest: no pain on palpation  Lungs: Mildly tachypneic, breathing through mouth, diminished breath sounds diffusely. No wheezing, rales, rhonchi. Heart: Irregularly irregular, normal rate, no murmur, normal S1, S2  Abdomen: soft, non-tender; bowel sounds normal; no masses, no organomegaly  : Deferred   Extremities: 2+ pitting edema R. pretibial, extremities atraumatic, no cyanosis, no clubbing, 2+ pedal pulses palpated  Skin: normal color, normal texture, normal turgor, no rashes, no lesions  Neurologic: GCS 12, opens eyes to voice or command, somewhat confused/disoriented. Does move all 4 extremities to verbal command. Sensation intact to light touch throughout all extremities.      Labs-   Lab Results   Component Value Date    WBC 6.4 03/22/2021    HGB 11.7 (L) 03/22/2021    HCT 40.7 03/22/2021     03/22/2021     03/22/2021    K 4.9 03/22/2021     03/22/2021    CREATININE 1.1 03/22/2021    BUN 22 03/22/2021    CO2 30 (H) 03/22/2021    GLUCOSE 71 (L) 03/22/2021    ALT 19 03/22/2021    AST 25 03/22/2021    INR 1.2 03/21/2021 Medium    CAD (coronary artery disease)      Priority: Medium     FREDA to RCA      Bilateral carotid artery stenosis 10/01/2020    History of stroke 09/24/2020    ASHD (arteriosclerotic heart disease) 04/03/2015    COPD (chronic obstructive pulmonary disease) ([facility] [address] 03/25/2015    Tobacco abuse 12/19/2011    Diabetes mellitus (HCC)     Hyperlipidemia     DEMARCUS (obstructive sleep apnea)        Plan  NSTEMI/CHF/hypoxia-known history of CAD: Cardiology put appreciated-for stress test. Telemetry. CE's elevated. Lipid panel. Consider ?-Blocker/ACEi-- defer to cardio. Continue Imdur/statin. Therapeutic Lovenox. ASA/Plavix. Echo noted. Follow I&O's. IV diuresis. DM, now with hypoglycemia: Holding Lantus. SSI. HbA1c. D10W since NPO and low sugars. RLE edema: Ultrasound to rule out DVT. Already on therapeutic Lovenox. Altered mental status-likely metabolic encephalopathy: CT head negative. Consider MRI brain after cardiac work-up. Continue nursing home medications  · Follow labs  · DVT prophylaxis. · Please see orders for further management and care. ·  for discharge planning  · Discharge plan: Back to [facility] when stable     Signed Adriana Soilz D.O., PGY-1. 3/22/21 at 11:40 AM.      Patient was seen and evaluated independently prior to being seen with attending physician Dr. Jade Bearden. Patient was independently examined and discussed with Dr. Jdae Bearden. All plans discussed with Dr. Jade Bearden. NOTE:  This report was transcribed using voice recognition software. Every effort was made to ensure accuracy; however, inadvertent computerized transcription errors may be present. Addendum: I have personally participated in a face-to-face history and physical exam on the date of service with the patient. I have discussed the case with the resident.  I also participated in medical decision making with the resident on the date of service and I agree with all of the pertinent clinical information unless indicated in my editing of the note. I have reviewed and edited the note above based on my findings during my history, exam, and decision making on the same day of service. My additional thoughts:    As above     Electronically signed by Kajal Robert DO on 3/22/2021 at 12:08 PM    I can be reached through [facility].

## 2021-03-22 NOTE — PROGRESS NOTES
Date: 3/21/2021    Time: 10:48 PM    Patient Placed On BIPAP/CPAP/ Non-Invasive Ventilation? Yes    If no must comment. Facial area red/color change? No           If YES are Blister/Lesion present? No   If yes must notify nursing staff  BIPAP/CPAP skin barrier? Yes    Skin barrier type:mepilexlite       Comments:Initially started pt on 12/6 but tidal volume very low on that setting.  Adjusted to 16/8 and patient getting adequate VT.      03/21/21 3487   NIV Type   $NIV $Daily Charge   Skin Assessment Clean, dry, & intact   Skin Protection for O2 Device Yes   Orientation Middle   Location Nose   NIV Started/Stopped On  (on at this time)   Equipment Type v60   Mode Biphasic   Mask Type Full face mask   Mask Size Medium   Settings/Measurements   IPAP 16 cmH20  (pt not getting enough VT on 12/6)   CPAP/EPAP 8 cmH2O   Rate Ordered 12   Resp 20   FiO2  40 %   I Time/ I Time % 1 s   Vt Exhaled 425 mL   Minute Volume 10 Liters   Mask Leak (lpm) 27 lpm   Comfort Level Good   Using Accessory Muscles No            Gabe Coats  Date: 3/21/2021

## 2021-03-22 NOTE — PROGRESS NOTES
SPEECH/LANGUAGE PATHOLOGY  CLINICAL ASSESSMENT OF SWALLOWING FUNCTION    PATIENT NAME:  Elizabeth Rios      :  1950      TODAY'S DATE:  3/22/2021  ROOM:  10/0610-A    SUMMARY OF EVALUATION  Chart reviewed, including most recent chest radiograph, and discussed with RN. RN reports coughing/choking with oral intake. Pt appeared SOB at times, however SpO2 remained above 90 throughout evaluation. Pt stated he did feel SOB. 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 Pt denies previous swallowing difficulties. DYSPHAGIA DIAGNOSIS:  Clinical indicators of oropharyngeal dysphagia; overt s/s of aspiration observed during this evaluation     Based on clinical indicators of aspiration, a video swallow study is recommended to further assess least restrictive oral diet and requires a physician order.        DIET RECOMMENDATIONS:  NPO (nothing by mouth including oral meds) until video swallow study to further assess least restrictive oral diet      FEEDING RECOMMENDATIONS:     Assistance level:  Not applicable      Compensatory strategies recommended: Not applicable    THERAPY RECOMMENDATIONS:      A Video Swallow Study (MBSS) is recommended and requires a physician order                 PROCEDURE     Consistencies Administered During the Evaluation   Liquids: thin liquid, nectar thick liquid and honey thick liquid   Solids:  pureed foods and solid foods      Method of Intake:   cup, spoon  Self fed, Fed by clinician      Position:   Seated, upright                  RESULTS     Oral Stage:         Decreased mastication due to:  poor/missing dentition        Pharyngeal Stage:      Throat clearing present after presentation of honey thick liquids    Wet respirations were noted after presentation of thin liquid, nectar consistency liquid, and honey consistency liquid    Wet/gurgly vocal quality was noted after presentation of all consistencies administered                  The Speech Language Pathologist (SLP) completed education with the patient regarding results of evaluation. Explained that Speech Pathology intervention is warranted  at this time   Prognosis for improvements is fair +     This plan will be re-evaluated and revised in 1 week  if warranted. Patient stated goals: Agreed with above,   Treatment goals discussed with Patient   The Patient understand(s) the diagnosis, prognosis and plan of care         INTERVENTION/EDUCATION    Pt educated on above results and plan of care. Pt trained on compensatory strategies for safe swallow with good outcome. Pt encouraged to engage in question/answer session. All questions answered and pt verbalized understanding of above. CPT code:  34853  bedside swallow eval, 72453 dysphagia therapy 15 mins      [x]The admitting diagnosis and active problem list, as listed below have been reviewed prior to initiation of this evaluation. ADMITTING DIAGNOSIS: Acute on chronic congestive heart failure with left ventricular diastolic dysfunction ([address] [I50.33]     ACTIVE PROBLEM LIST:   Patient Active Problem List   Diagnosis    CAD (coronary artery disease)    Diabetes mellitus ([address]    Hyperlipidemia    DEMARCUS (obstructive sleep apnea)    Tobacco abuse    COPD (chronic obstructive pulmonary disease) (HCC)    ASHD (arteriosclerotic heart disease)    History of stroke    Bilateral carotid artery stenosis    Acute respiratory failure with hypoxia (HCC)    Acute on chronic congestive heart failure with left ventricular diastolic dysfunction (HCC)    NSTEMI (non-ST elevated myocardial infarction) ([address]       OPAL Calvillo. Speech-Language Pathology Student    Marisol LOWERY CCC/SLP [id number]  Speech-Language Pathologist

## 2021-03-22 NOTE — PROGRESS NOTES
[facility] Quality Flow/Interdisciplinary Rounds Progress Note        Quality Flow Rounds held on March 22, 2021    Disciplines Attending:  Bedside Nurse, ,  and Nursing Unit 11[address] Jay Wise was admitted on 3/21/2021 11:29 AM    Anticipated Discharge Date:  Expected Discharge Date: 03/23/21    Disposition:    Rene Score:  Rene Scale Score: 16    Readmission Risk              Risk of Unplanned Readmission:        22           Discussed patient goal for the day, patient clinical progression, and barriers to discharge. The following Goal(s) of the Day/Commitment(s) have been identified: Monitor blood sugar, monitor labs, cognitive ability, and discharge planning.       Amairani Hilliard  March 22, 2021

## 2021-03-22 NOTE — PROGRESS NOTES
Spoke with the Sergey Braga at [phone]. She would like to talk to Dr. Sahara Ríos before giving approval for stress.

## 2021-03-23 ENCOUNTER — APPOINTMENT (OUTPATIENT)
Dept: NUCLEAR MEDICINE | Age: 71
DRG: 280 | End: 2021-03-23
Payer: MEDICARE

## 2021-03-23 LAB
ALBUMIN SERPL-MCNC: 3.1 G/DL (ref 3.5–5.2)
ALP BLD-CCNC: 86 U/L (ref 40–129)
ALT SERPL-CCNC: 18 U/L (ref 0–40)
ANION GAP SERPL CALCULATED.3IONS-SCNC: 7 MMOL/L (ref 7–16)
AST SERPL-CCNC: 22 U/L (ref 0–39)
BASOPHILS ABSOLUTE: 0.06 E9/L (ref 0–0.2)
BASOPHILS RELATIVE PERCENT: 1 % (ref 0–2)
BILIRUB SERPL-MCNC: 0.8 MG/DL (ref 0–1.2)
BUN BLDV-MCNC: 28 MG/DL (ref 8–23)
CALCIUM SERPL-MCNC: 8.1 MG/DL (ref 8.6–10.2)
CHLORIDE BLD-SCNC: 101 MMOL/L (ref 98–107)
CO2: 29 MMOL/L (ref 22–29)
CREAT SERPL-MCNC: 1.3 MG/DL (ref 0.7–1.2)
EOSINOPHILS ABSOLUTE: 0.18 E9/L (ref 0.05–0.5)
EOSINOPHILS RELATIVE PERCENT: 3 % (ref 0–6)
GFR AFRICAN AMERICAN: >60
GFR NON-AFRICAN AMERICAN: 54 ML/MIN/1.73
GLUCOSE BLD-MCNC: 98 MG/DL (ref 74–99)
HBA1C MFR BLD: 5.9 % (ref 4–5.6)
HCT VFR BLD CALC: 36.1 % (ref 37–54)
HEMOGLOBIN: 10.6 G/DL (ref 12.5–16.5)
IMMATURE GRANULOCYTES #: 0.02 E9/L
IMMATURE GRANULOCYTES %: 0.3 % (ref 0–5)
LV EF: 58 %
LVEF MODALITY: NORMAL
LYMPHOCYTES ABSOLUTE: 1.62 E9/L (ref 1.5–4)
LYMPHOCYTES RELATIVE PERCENT: 27 % (ref 20–42)
MCH RBC QN AUTO: 24.2 PG (ref 26–35)
MCHC RBC AUTO-ENTMCNC: 29.4 % (ref 32–34.5)
MCV RBC AUTO: 82.4 FL (ref 80–99.9)
METER GLUCOSE: 109 MG/DL (ref 74–99)
METER GLUCOSE: 119 MG/DL (ref 74–99)
METER GLUCOSE: 138 MG/DL (ref 74–99)
METER GLUCOSE: 95 MG/DL (ref 74–99)
METER GLUCOSE: 98 MG/DL (ref 74–99)
MONOCYTES ABSOLUTE: 0.88 E9/L (ref 0.1–0.95)
MONOCYTES RELATIVE PERCENT: 14.7 % (ref 2–12)
NEUTROPHILS ABSOLUTE: 3.23 E9/L (ref 1.8–7.3)
NEUTROPHILS RELATIVE PERCENT: 54 % (ref 43–80)
PDW BLD-RTO: 15.8 FL (ref 11.5–15)
PLATELET # BLD: 246 E9/L (ref 130–450)
PMV BLD AUTO: 10.3 FL (ref 7–12)
POTASSIUM REFLEX MAGNESIUM: 4.6 MMOL/L (ref 3.5–5)
RBC # BLD: 4.38 E12/L (ref 3.8–5.8)
SODIUM BLD-SCNC: 137 MMOL/L (ref 132–146)
TOTAL PROTEIN: 5.9 G/DL (ref 6.4–8.3)
URINE CULTURE, ROUTINE: NORMAL
WBC # BLD: 6 E9/L (ref 4.5–11.5)

## 2021-03-23 PROCEDURE — 6360000002 HC RX W HCPCS: Performed by: INTERNAL MEDICINE

## 2021-03-23 PROCEDURE — 93017 CV STRESS TEST TRACING ONLY: CPT

## 2021-03-23 PROCEDURE — 97165 OT EVAL LOW COMPLEX 30 MIN: CPT

## 2021-03-23 PROCEDURE — 36415 COLL VENOUS BLD VENIPUNCTURE: CPT

## 2021-03-23 PROCEDURE — 97161 PT EVAL LOW COMPLEX 20 MIN: CPT

## 2021-03-23 PROCEDURE — 2060000000 HC ICU INTERMEDIATE R&B

## 2021-03-23 PROCEDURE — A9500 TC99M SESTAMIBI: HCPCS | Performed by: RADIOLOGY

## 2021-03-23 PROCEDURE — 94640 AIRWAY INHALATION TREATMENT: CPT

## 2021-03-23 PROCEDURE — 80053 COMPREHEN METABOLIC PANEL: CPT

## 2021-03-23 PROCEDURE — 78452 HT MUSCLE IMAGE SPECT MULT: CPT

## 2021-03-23 PROCEDURE — 94660 CPAP INITIATION&MGMT: CPT

## 2021-03-23 PROCEDURE — 78452 HT MUSCLE IMAGE SPECT MULT: CPT | Performed by: INTERNAL MEDICINE

## 2021-03-23 PROCEDURE — 82962 GLUCOSE BLOOD TEST: CPT

## 2021-03-23 PROCEDURE — 2580000003 HC RX 258: Performed by: INTERNAL MEDICINE

## 2021-03-23 PROCEDURE — 3430000000 HC RX DIAGNOSTIC RADIOPHARMACEUTICAL: Performed by: RADIOLOGY

## 2021-03-23 PROCEDURE — 2700000000 HC OXYGEN THERAPY PER DAY

## 2021-03-23 PROCEDURE — 6370000000 HC RX 637 (ALT 250 FOR IP): Performed by: INTERNAL MEDICINE

## 2021-03-23 PROCEDURE — 85025 COMPLETE CBC W/AUTO DIFF WBC: CPT

## 2021-03-23 PROCEDURE — 83036 HEMOGLOBIN GLYCOSYLATED A1C: CPT

## 2021-03-23 RX ORDER — FUROSEMIDE 10 MG/ML
40 INJECTION INTRAMUSCULAR; INTRAVENOUS DAILY
Status: DISCONTINUED | OUTPATIENT
Start: 2021-03-24 | End: 2021-03-25

## 2021-03-23 RX ADMIN — Medication 10 ML: at 15:20

## 2021-03-23 RX ADMIN — ENOXAPARIN SODIUM 120 MG: 120 INJECTION SUBCUTANEOUS at 15:21

## 2021-03-23 RX ADMIN — BUDESONIDE 500 MCG: 0.5 SUSPENSION RESPIRATORY (INHALATION) at 20:43

## 2021-03-23 RX ADMIN — MICONAZOLE NITRATE: 2 POWDER TOPICAL at 20:25

## 2021-03-23 RX ADMIN — MONTELUKAST SODIUM 10 MG: 10 TABLET, FILM COATED ORAL at 20:25

## 2021-03-23 RX ADMIN — ENOXAPARIN SODIUM 120 MG: 120 INJECTION SUBCUTANEOUS at 20:23

## 2021-03-23 RX ADMIN — FUROSEMIDE 40 MG: 10 INJECTION, SOLUTION INTRAMUSCULAR; INTRAVENOUS at 15:31

## 2021-03-23 RX ADMIN — ARFORMOTEROL TARTRATE 15 MCG: 15 SOLUTION RESPIRATORY (INHALATION) at 20:43

## 2021-03-23 RX ADMIN — REGADENOSON 0.4 MG: 0.08 INJECTION, SOLUTION INTRAVENOUS at 10:10

## 2021-03-23 RX ADMIN — ARFORMOTEROL TARTRATE 15 MCG: 15 SOLUTION RESPIRATORY (INHALATION) at 07:35

## 2021-03-23 RX ADMIN — ATORVASTATIN CALCIUM 20 MG: 40 TABLET, FILM COATED ORAL at 15:20

## 2021-03-23 RX ADMIN — ASPIRIN 325 MG: 325 TABLET, COATED ORAL at 15:21

## 2021-03-23 RX ADMIN — Medication 10 MILLICURIE: at 08:42

## 2021-03-23 RX ADMIN — DEXTROSE MONOHYDRATE 100 ML/HR: 50 INJECTION, SOLUTION INTRAVENOUS at 13:00

## 2021-03-23 RX ADMIN — Medication 1 TABLET: at 15:21

## 2021-03-23 RX ADMIN — BUDESONIDE 500 MCG: 0.5 SUSPENSION RESPIRATORY (INHALATION) at 07:35

## 2021-03-23 RX ADMIN — Medication 1 TABLET: at 20:24

## 2021-03-23 RX ADMIN — MICONAZOLE NITRATE: 2 POWDER TOPICAL at 15:21

## 2021-03-23 RX ADMIN — CLOPIDOGREL BISULFATE 75 MG: 75 TABLET ORAL at 15:20

## 2021-03-23 RX ADMIN — Medication 30 MILLICURIE: at 08:42

## 2021-03-23 ASSESSMENT — PAIN SCALES - GENERAL: PAINLEVEL_OUTOF10: 0

## 2021-03-23 NOTE — PROGRESS NOTES
Occupational Therapy  OCCUPATIONAL THERAPY INITIAL EVALUATION      Date:3/23/2021  Patient Name: Charles Allred  MRN: [id number]  : 1950  Room: [address] [place] [address]    Referring Provider: Alyce Closs, DO    Evaluating OT: Glorio Channel OTR/L [id number]    AM-PAC Daily Activity Raw Score:     Recommended Adaptive Equipment: TBD    Diagnosis: CHF. Pt presents to ED from facility with AMS. Pertinent Medical History: arthritis, asthma, CAD, COPD, DM, CA   Precautions:  falls     Home Living: Pt is a poor historian unable to provide PLOF. Per medical chart pt admitted from LTC facility. Unknown PLOF. Pain Level: no reported pain    Cognition: A&O: . Pt is oriented to self only. Pleasant and cooperative throughout session. Easily distracted. Problem solving:  poor   Judgement/safety:  poor     Functional Assessment:   Initial Eval Status  Date: 3/23/21 Treatment session:  Short Term Goals     Feeding Set up     Grooming Min A  Set up   UB Dressing Mod A  Management of hospital gown  Set up   LB Dressing Max A  Management of B socks  Mod A    Bathing Max A  Mod A   Toileting Max A  Min A   Bed Mobility  Supine <> sit: Max A     Functional Transfers STS: Min A  SBA   Functional Mobility Min A with [employer]  Small steps forward/back and side steps to HOB  SBA during ADLs   Balance Sitting: fair    Standing: fair minus at [employer]     Activity Tolerance Poor plus  standing reshma x6-7 min with fair plus balance during self care tasks             Treatment: Patient educated on techniques for completion of ADL, safe functional transfers and functional mobility. Patient required cues for follow through with proper hand/foot placement, pacing, safety, compensatory strategies, breathing, attention, sequencing and technique in bed mobility, functional transfers, functional mobility and LB dressing in preparation for maximum independence in all self care tasks.      Hand Dominance: Right []  Left []   Strength ROM Additional Info:    RUE  4-/5 WFL good  and FMC/dexterity noted during ADL tasks     LUE 4-/5 WFL good  and FMC/dexterity noted during ADL tasks         Hearing: WFL   Vision: WFL   Sensation:  No c/o numbness or tingling   Tone: WFL   Edema: none                            Long Term Goal (1-3 wks): Pt will maximize functional performance in all self care tasks/functional transfers with good follow through of all trained techniques for safe transition to next level of care    Assessment of current deficits   Functional mobility [x]  ADLs [x] Strength [x]  Cognition []  Functional transfers  [x] IADLs [x] Safety Awareness [x]  Endurance [x]  Fine Motor Coordination [] Balance [x] Vision/perception [] Sensation []   Gross Motor Coordination [] ROM [] Delirium []                  Motor Control []    Plan of Care: 2-5 days/week for 1-2 weeks PRN   [x]ADL retraining/adaptive techniques and AE recommendations to increase functional independence within precautions                    [x]Energy conservation techniques to improve tolerance for ADL/IADLs  [x]Functional transfer/mobility training/DME recommendations for increased independence, safety and fall prevention         [x]Patient/family education to increase safety and functional independence during daily routine          [x]Environmental modifications for safe mobility and completion of ADLs                             []Cognitive retraining to improve problem solving skills & safe participation in ADLs/IADLs     []Sensory re-education techniques to improve extremity awareness, maintain skin integrity and improve hand function                             []Visual/Perceptual retraining to improve body awareness and safety during transfers and ADLs  []Splinting/positioning needs to maintain joint/skin integrity and contracture prevention  [x]Therapeutic activity to improve functional performance during ADLs                                        [x]Therapeutic exercise to improve tolerance and functional strength for ADLs   [x]Balance retraining/tolerance tasks for facilitation of postural control with dynamic challenges during ADLs  []Neuromuscular re-education to facilitate righting/equilibrium reactions, midline orientation, scapular stability/mobility, normalize muscle tone and facilitate active functional movement                        []Delirium prevention/treatment    [x]Positioning to improve functional independence and decrease risk of skin breakdown  []Other:     Rehab Potential: Good for established goals     Patient/Family Goal: Pt does not state goal.      Patient and/or family were instructed on functional diagnosis, prognosis/goals and OT plan of care. Pt verbalized poor understanding. Upon arrival, patient supine in bed. At end of session, patient supine in bed with call light and phone within reach, all lines and tubes intact. Pt would benefit from continued skilled OT to increase safety and independence with completion of ADL/IADL tasks for functional independence and quality of life.  Bed/chair alarm: ON    Low Evaluation [id number]  Time In: 1413   Time Out: 1424      Evaluation time includes thorough review of current medical information, gathering information on past medical history/social history and prior level of function, completion of standardized testing/informal observation of tasks, assessment of data, and development of POC/Goals    Vickie Collins OTR/L  [id number]

## 2021-03-23 NOTE — PROGRESS NOTES
this evaluation. Treatment/Education:    Mobility as above. Pt required encouragement. Cues for proper technique with mobility. Pt performed very short distance gait ; refused further distance. Assist needed for controlled sit. Pt with labored breathing with minimal mobility. Pulse ox on RA after activity 93%. Cues for proper breathing technique        Pt educated on fall risk,  Safe and proper technique with mobility        Patient response to education:   Pt verbalized understanding Pt demonstrated skill Pt requires further education in this area   no no Yes        Comments:  Pt left  In bed after session, with call light in reach. Rehab potential is Guarded  for reaching above PT goals. Pts/ family goals   1. None stated     Patient and or family understand(s) diagnosis, prognosis, and plan of care. -  no    PLAN  PT care will be provided in accordance with the objectives noted above. Whenever appropriate, clear delegation orders will be provided for nursing staff. Exercises and functional mobility practice will be used as well as appropriate assistive devices or modalities to obtain goals. Patient and family education will also be administered as needed. PLAN OF CARE:    Current Treatment Recommendations     [x] Strengthening     [] ROM   [x] Balance Training   [x] Endurance Training   [x] Transfer Training   [x] Gait Training   [] Stair Training   [x] Positioning   [x] Safety and Education Training   [x] Patient/Caregiver Education   [] HEP  [] Other       Frequency of treatments will be 2-3x/week x  7 -10 days. Time in: 1413   Time out:  1425       Evaluation Time includes thorough review of current medical information, gathering information on past medical history/social history and prior level of function, completion of standardized testing/informal observation of tasks, assessment of data and education on plan of care and goals.     CPT codes:  [x] Low Complexity PT evaluation [id number]  [] Moderate Complexity PT evaluation 58104  [] High Complexity PT evaluation [id number]  [] PT Re-evaluation [id number]  [] Gait training 53801  minutes  [] Therapeutic activities 15593  minutes  [] Therapeutic exercises 43636  minutes  [] Neuromuscular reeducation 41557  minutes       Kevin 18 number:  PT [id number]

## 2021-03-23 NOTE — PROGRESS NOTES
Internal Medicine Progress Note    Patient's name: Rama Murillo  : 1950  Chief complaints (on day of admission): Altered Mental Status (From lashaun, BS-60 1 amp given)  Admission date: 3/21/2021  Date of service: 3/23/2021   Room: [address] SURG  Primary care physician: Hung Bauman DO  Reason for visit: Follow-up for NSTEMI    Subjective  Noel Beaulieu was seen and examined, he is laying in bed comfortably, awake and alert today. Sister at bedside. He is coherent, completely alert today, has no pain or complaints. He had amarjit-scan stress test this morning, discussed possible need for tranfer to  if cardiology feels heart cath is indicated based on results of amarjit-scan. Review of Systems  There are no new complaints of chest pain, shortness of breath, abdominal pain, nausea, vomiting, diarrhea, constipation.     Hospital Medications  Current Facility-Administered Medications   Medication Dose Route Frequency Provider Last Rate Last Admin    dextrose 10 % infusion   Intravenous Continuous Dung Ayala,         regadenoson (LEXISCAN) injection 0.4 mg  0.4 mg Intravenous ONCE PRN Zarina Lyons MD        furosemide (LASIX) injection 40 mg  40 mg Intravenous BID Zarina Lyons MD   40 mg at 21 1835    sodium chloride flush 0.9 % injection 10 mL  10 mL Intravenous 2 times per day Jaja E Vincent, DO   10 mL at 21    sodium chloride flush 0.9 % injection 10 mL  10 mL Intravenous PRN Jaja PRASANNA Vincent, DO   10 mL at 21 1836    potassium chloride (KLOR-CON M) extended release tablet 40 mEq  40 mEq Oral PRN Jaja E Vincent, DO        Or    potassium bicarb-citric acid (EFFER-K) effervescent tablet 40 mEq  40 mEq Oral PRN Jaja E Vincent, DO        Or    potassium chloride 10 mEq/100 mL IVPB (Peripheral Line)  10 mEq Intravenous PRN Jaja E Vincent, DO        enoxaparin (LOVENOX) injection 120 mg  1 mg/kg Subcutaneous BID Jaja E Vincent, DO   120 mg at 03/22/21 2006    senna (SENOKOT) tablet 8.6 mg  1 tablet Oral Daily PRN Jaja E Vincent, DO        acetaminophen (TYLENOL) tablet 650 mg  650 mg Oral Q6H PRN Jaja E Vincent, DO        Or    acetaminophen (TYLENOL) suppository 650 mg  650 mg Rectal Q6H PRN Jaja E Vincent, DO        aspirin EC tablet 325 mg  325 mg Oral Daily Jaja E Vincent, DO   325 mg at 03/22/21 1722    atorvastatin (LIPITOR) tablet 20 mg  20 mg Oral Daily Jaja E Vincent, DO   20 mg at 03/22/21 1721    [Held by provider] citalopram (CELEXA) tablet 10 mg  10 mg Oral Daily Jaja E Vincent, DO        clopidogrel (PLAVIX) tablet 75 mg  75 mg Oral Daily Jaja E Vincent, DO   75 mg at 03/22/21 1722    [Held by provider] insulin glargine (LANTUS) injection vial 43 Units  43 Units Subcutaneous Nightly Jaja E Vincent, DO        levothyroxine (SYNTHROID) tablet 150 mcg  150 mcg Oral Daily Jaja MIMS Vincent, DO   Stopped at 03/23/21 0510    montelukast (SINGULAIR) tablet 10 mg  10 mg Oral Nightly Jaja MIMS Vincent, DO   10 mg at 03/22/21 2007    antioxidant multivitamin (OCUVITE) tablet  1 tablet Oral BID Jaja MIMS Vincent, DO   1 tablet at 03/22/21 2007    oxyCODONE-acetaminophen (PERCOCET) 5-325 MG per tablet 1 tablet  1 tablet Oral Q4H PRN Jaja MIMS Vincent, DO        pantoprazole (PROTONIX) tablet 40 mg  40 mg Oral QAM AC Jaja MIMS Vincent, DO   Stopped at 03/23/21 0510    polyvinyl alcohol (LIQUIFILM TEARS) 1.4 % ophthalmic solution 1 drop  1 drop Left Eye PRN Jaja E Vincent, DO        insulin lispro (HUMALOG) injection vial 0-6 Units  0-6 Units Subcutaneous TID WC Jaja E Vincent, DO        insulin lispro (HUMALOG) injection vial 0-3 Units  0-3 Units Subcutaneous Nightly Jaja E Vincent, DO        glucose (GLUTOSE) 40 % oral gel 15 g  15 g Oral PRN Jaja E Vincent, DO        dextrose 50 % IV solution  12.5 g Intravenous PRN Jaja Kaur DO   12.5 g at 03/21/21 1841    glucagon (rDNA) injection 1 mg  1 mg Intramuscular PRN Jaja E Vincent, DO        dextrose 5 % solution  100 mL/hr Intravenous PRN Jaja E Vincent, DO        ipratropium-albuterol (DUONEB) nebulizer solution 1 ampule  1 ampule Inhalation Q4H PRN Jaja E Vincent, DO        Arformoterol Tartrate (BROVANA) nebulizer solution 15 mcg  15 mcg Nebulization BID Jaja E Vincent, DO   15 mcg at 03/22/21 2023    And    budesonide (PULMICORT) nebulizer suspension 500 mcg  0.5 mg Nebulization BID Jaja E Vincent, DO   500 mcg at 03/22/21 2024    miconazole (MICOTIN) 2 % powder   Topical BID Jaja E Vincent, DO   Given at 03/22/21 2007    ammonium lactate (LAC-HYDRIN) 12 % lotion   Topical BID PRN Jaja E Vincent, DO           PRN Medications  regadenoson, sodium chloride flush, potassium chloride **OR** potassium alternative oral replacement **OR** potassium chloride, senna, acetaminophen **OR** acetaminophen, oxyCODONE-acetaminophen, polyvinyl alcohol, glucose, dextrose, glucagon (rDNA), dextrose, ipratropium-albuterol, ammonium lactate    Objective  Most Recent Recorded Vitals  BP (!) 104/56   Pulse 68   Temp 97.9 °F (36.6 °C) (Axillary)   Resp 22   Ht 5' 7\" (1.702 m)   Wt 248 lb 4.8 oz (112.6 kg)   SpO2 95%   BMI 38.89 kg/m²   I/O last 3 completed shifts:   In: 0   Out: 1800 [Urine:1800]  I/O this shift:  In: -   Out: 350 [Urine:350]    Physical Exam:  General: AAO to person/place/time/purpose, NAD, no labored breathing  Eyes: conjunctivae/corneas clear, sclera non icteric  Skin: color/texture/turgor normal, no rashes or lesions  Lungs: CTAB, no retractions/use of accessory muscles, no vocal fremitus, no rhonchi, no crackle, no rales  Heart: regular rate, regular rhythm, no murmur  Abdomen: soft, NT, bowel sounds normal  Extremities: atraumatic, 1+ edema pretibial  Neurologic: cranial nerves 2-12 grossly intact, speech is slightly garbled but intelligible and at baseline    Most Recent Labs  Lab Results   Component Value Date    WBC 6.0 03/23/2021    HGB 10.6 (L) 03/23/2021    HCT 36.1 (L) 03/23/2021     03/23/2021     03/23/2021    K 4.6 03/23/2021     03/23/2021    CREATININE 1.3 (H) 03/23/2021    BUN 28 (H) 03/23/2021    CO2 29 03/23/2021    GLUCOSE 98 03/23/2021    ALT 18 03/23/2021    AST 22 03/23/2021    INR 1.2 03/21/2021    TSH 12.220 (H) 03/25/2015    LABA1C 12.1 (H) 02/05/2015       US DUP LOWER EXTREMITY RIGHT LOLA   Final Result   No evidence of DVT in the right lower extremity. FL MODIFIED BARIUM SWALLOW W VIDEO   Final Result   Mildly impaired swallowing mechanism including vallecular retention of   barium. No barium aspiration. Please see separate speech pathology report for full discussion of findings   and recommendations. CT CERVICAL SPINE WO CONTRAST   Final Result   No acute abnormality of the cervical spine. CT HEAD WO CONTRAST   Final Result   No acute intracranial abnormality. No intracranial hemorrhage. XR CHEST PORTABLE   Final Result   Bilateral airspace opacities may represent pulmonary edema or diffuse   atelectasis. Repeat chest radiograph may be obtained if clinically warranted. NM Cardiac Stress Test Nuclear Imaging    (Results Pending)       Echocardiogram 3/21/21  The left atrium is mildly dilated. LV Ejection fraction is visually estimated at 60%. Mild left ventricular concentric hypertrophy noted. The aortic valve appears moderately sclerotic. Moderate aortic valve stenosis. No evidence of aortic valve regurgitation.     Assessment   Active Hospital Problems    Diagnosis    NSTEMI (non-ST elevated myocardial infarction) ([facility] [address] [I21.4]     Priority: High    Acute on chronic congestive heart failure with left ventricular diastolic dysfunction (HCC) [I50.33]     Priority: Medium    Acute respiratory failure with hypoxia (HCC) [J96.01]     Priority: Medium    CAD (coronary artery disease) [I25.10]     Priority: Medium    Bilateral improved   Case discussed with daughter at bedside   Cut back Lasix-- sCr elevated   Hold Lantus and continue D10-- blood sugars still low   Awaiting stress test results    Electronically signed by Ericka Mayfield DO on 3/23/2021 at 1:09 PM    I can be reached through [employer].

## 2021-03-23 NOTE — PROGRESS NOTES
Lexiscan Stress Test completed with Dr. Romeo Garrett, nurses, nuclear tech and patient wearing procedural masks.
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
## 2021-03-23 NOTE — PROGRESS NOTES
[facility] CARDIOLOGY PROGRESS NOTE  [facility]        Subjective: Seeing patient as he presented with shortness of breath, hypoxia, chest pressure and ruled in for non-ST elevation myocardial infarction with known history of chronic CAD with prior percutaneous interventions on 2 occasions, chronic hypertension, chronic diabetes, PAF and I reviewed his echocardiogram with him that was done this admission that confirms moderate aortic valve stenosis and normal LVEF 60%. He denies any chest pressure or chest pain currently or overnight. Objective: Labs chart medications and telemetry are reviewed. Patient Vitals for the past 24 hrs:   BP Temp Temp src Pulse Resp SpO2 Weight   03/23/21 1200 (!) 136/55 98.6 °F (37 °C) Axillary 78 20 92 % --   03/23/21 0339 -- -- -- -- -- -- 248 lb 4.8 oz (112.6 kg)   03/22/21 2243 (!) 104/56 97.9 °F (36.6 °C) Axillary 68 22 -- --   03/22/21 2201 -- -- -- -- 25 -- --   03/22/21 1826 (!) 120/59 -- -- 64 -- -- --   03/22/21 1545 (!) 105/57 98.2 °F (36.8 °C) Oral 67 24 95 % --         Intake/Output Summary (Last 24 hours) at 3/23/2021 1323  Last data filed at 3/23/2021 1175  Gross per 24 hour   Intake 0 ml   Output 1800 ml   Net -1800 ml       Wt Readings from Last 3 Encounters:   03/23/21 248 lb 4.8 oz (112.6 kg)   01/18/21 250 lb (113.4 kg)   12/12/20 257 lb (116.6 kg)       Telemetry: I personally reviewed and shows normal sinus rhythm heart rate in the 70s.     Current meds: Scheduled Meds:   [START ON 3/24/2021] furosemide  40 mg Intravenous Daily    sodium chloride flush  10 mL Intravenous 2 times per day    enoxaparin  1 mg/kg Subcutaneous BID    aspirin  325 mg Oral Daily    atorvastatin  20 mg Oral Daily    [Held by provider] citalopram  10 mg Oral Daily    clopidogrel  75 mg Oral Daily    [Held by provider] insulin glargine  43 Units Subcutaneous Nightly    levothyroxine  150 mcg Oral Daily    montelukast  10 mg Oral Nightly    ocuvite-lutein  1 tablet Oral BID    pantoprazole  40 mg Oral QAM AC    insulin lispro  0-6 Units Subcutaneous TID WC    insulin lispro  0-3 Units Subcutaneous Nightly    Arformoterol Tartrate  15 mcg Nebulization BID    And    budesonide  0.5 mg Nebulization BID    miconazole   Topical BID     Continuous Infusions:   dextrose      dextrose       PRN Meds:.sodium chloride flush, potassium chloride **OR** potassium alternative oral replacement **OR** potassium chloride, senna, acetaminophen **OR** acetaminophen, oxyCODONE-acetaminophen, polyvinyl alcohol, glucose, dextrose, glucagon (rDNA), dextrose, ipratropium-albuterol, ammonium lactate    Allergies: Patient has no known allergies. Labs:   Recent Labs     03/21/21  1159 03/22/21 0245 03/23/21  0345   WBC 14.3* 6.4 6.0   HGB 11.9* 11.7* 10.6*   HCT 40.6 40.7 36.1*   MCV 83.4 84.1 82.4    292 246       Labs:   Recent Labs     03/21/21  1159 03/22/21 0245 03/23/21 0345    143 137   K 4.3 4.9 4.6    106 101   CO2 28 30* 29   BUN 24* 22 28*   CREATININE 1.2 1.1 1.3*       Labs:   Recent Labs     03/21/21  1159 03/21/21  2030 03/22/21 0245   TROPONINI 0.32* 0.23* 0.50*       Labs: No results for input(s): BNP in the last 72 hours. Labs: No results for input(s): CHOL, HDL, TRIG in the last 72 hours. Invalid input(s): Fern Alken    Labs:   Recent Labs     03/21/21  1159 03/22/21  0245 03/23/21 0345   PROT 7.0 6.7 5.9*   INR 1.2  --   --        Review of systems: No reported significant weight gain or weight loss. no dysuria or frequency, no dizziness, falls or trauma, no change in bowel or bladder habits, no hematochezia, hemoptysis or hematuria. No fevers, chills, nausea or vomiting reported. No significant wheezing or sputum production. No headache or visual changes. The remainder of the 10 review of systems otherwise negative.                 Exam      General: Patient comfortable in no distress and currently denies any chest pain.    HEENT: Face symmetrical and no apparent cranial nerve deficit. Neck: No jugular venous distention, carotid bruit or thyromegaly. Lungs: Clear bilaterally without rales, wheezes or dullness. Cardiac: Regular rate and rhythm, no S3, S4, no rub or gallop. Abdomen: No rebound or guarding, no hepatosplenomegaly. Extremities: Without significant clubbing , cyanosis, or edema. Neuro:  No focal motor or sensory deficit apparent. Skin: No petechiae, no significant bruising. Assessment: See plan below        Plan: #1 non-ST elevation myocardial infarction with known chronic CAD with angioplasty 2002 and then stents to the RCA and LAD 2011, underwent Lexiscan stress test today that showed normal LVEF 58% and fixed inferior and inferior apical defect consistent with prior infarction without ischemia. Without significant ischemia and normal LVEF 58% would not pursue heart catheterization and medical management of known CAD. Continue aspirin currently on 325 mg daily, clopidogrel 75 mg daily, continue to optimize blood sugar, blood pressure. Prior bradycardia so at this time would not add beta-blocker. #2 hypoxia and shortness of breath and prior Covid December 2020. BNP 6100 on March 21, 2021 and has received IV diuretic and urine output -1800 cc overnight. Creatinine stable 1.3 and a BUN of 28. Right lower extremity ultrasound negative for DVT. Would continue low-dose furosemide 20 mg daily at home. #3 moderate aortic valve stenosis is stable with normal LVEF. #4 paroxysmal atrial fibrillation currently normal sinus rhythm. #5 diabetes and follow-up blood sugar. This admission hemoglobin A1c 5.9 would reflect excellent blood sugar management. #6 hyperlipidemia and continues on atorvastatin 20 mg daily.     Advance activity and could discharge him to home tomorrow from cardiology viewpoint and plan outpatient follow-up my office [facility] cardiology in 2 to 4 weeks.     Electronically signed by Denny Torres MD on 3/23/2021 at 1:23 PM

## 2021-03-23 NOTE — CONSULTS
[facility] cardiology/[facility] of [address] [place] [address]    Name: Maya Reece    Age: 66 y.o. Date of Admission: 3/21/2021 11:29 AM    Date of Service: 3/22/2021    Reason for Consultation: Shortness of breath, hypoxia, chest pressure    Referring Physician:     History of Present Illness: The patient is a 66y.o. year old male with a known history of chronic CAD with angioplasty 2002 and then stent to the RCA and LAD 2011, DM, troponin initially 0.32 then 0.23 then 0.5. He resides in an extended care facility for the past for 5 years and some of the history is obtained from the patient but his brother was also at bedside and I reviewed his current chart and old chart. He was also in the hospital December 2020 and was found to have Covid positive and at that time bradycardic. Today he reports he is feeling better and denies any distinct chest pain but is a little short of breath. He is breathing better than he was 2 days ago and he is conversant and appropriate without focal motor or sensory deficit. He denies any sputum production or fevers or chills. Past Medical History: As above, hypertension    Past surgical history scrotal abscess surgery    Review of Systems:     Constitutional: No fever, chills, sweats  Cardiac: As per HPI  Pulmonary: No cough, wheeze, hemoptysis  HEENT: No visual disturbances or difficult swallowing  GI: No nausea, vomiting, diarrhea, abdominal pain, rectal bleeding  : No dysuria or hematuria  Endocrine: No excessive thirst, heat or cold intolerance. Musculoskeletal: No joint pain or muscle aches.  No claudication  Skin: No skin breakdown or rashes  Neuro: No headache, confusion, or seizures  Psych: No depression, anxiety      Family History:  Family History   Problem Relation Age of Onset    Cancer Mother     Heart Disease Father        Social History:  Social History     Socioeconomic History    Marital status: Single     Spouse name: Not on file    Number of children: Not on file    Years of education: Not on file    Highest education level: Not on file   Occupational History    Not on file   Social Needs    Financial resource strain: Not on file    Food insecurity     Worry: Not on file     Inability: Not on file    Transportation needs     Medical: Not on file     Non-medical: Not on file   Tobacco Use    Smoking status: Former Smoker     Packs/day: 0.50     Years: 44.00     Pack years: 22.00     Types: Cigarettes     Start date:      Quit date: 2020     Years since quittin.3    Smokeless tobacco: Never Used   Substance and Sexual Activity    Alcohol use: No     Alcohol/week: 0.0 standard drinks    Drug use: No    Sexual activity: Not Currently   Lifestyle    Physical activity     Days per week: Not on file     Minutes per session: Not on file    Stress: Not on file   Relationships    Social connections     Talks on phone: Not on file     Gets together: Not on file     Attends Buddhist service: Not on file     Active member of club or organization: Not on file     Attends meetings of clubs or organizations: Not on file     Relationship status: Not on file    Intimate partner violence     Fear of current or ex partner: Not on file     Emotionally abused: Not on file     Physically abused: Not on file     Forced sexual activity: Not on file   Other Topics Concern    Not on file   Social History Narrative    Not on file       Allergies:  No Known Allergies    Current Medications:  Current Facility-Administered Medications   Medication Dose Route Frequency Provider Last Rate Last Admin    dextrose 10 % infusion   Intravenous Continuous Dung Ayala DO        regadenoson (LEXISCAN) injection 0.4 mg  0.4 mg Intravenous ONCE PRN Edward Oviedo MD        sodium chloride flush 0.9 % injection 10 mL  10 mL Intravenous 2 times per day Jaja Kaur DO   10 mL at 21 0840    sodium chloride flush 0.9 % injection 10 mL  10 mL Intravenous PRN Jaja E Vincent, DO        potassium chloride (KLOR-CON M) extended release tablet 40 mEq  40 mEq Oral PRN Jaja E Vincent, DO        Or    potassium bicarb-citric acid (EFFER-K) effervescent tablet 40 mEq  40 mEq Oral PRN Jaja E Vincent, DO        Or    potassium chloride 10 mEq/100 mL IVPB (Peripheral Line)  10 mEq Intravenous PRN Jaja E Vincent, DO        enoxaparin (LOVENOX) injection 120 mg  1 mg/kg Subcutaneous BID Jaja E Vincent, DO   120 mg at 03/22/21 0949    senna (SENOKOT) tablet 8.6 mg  1 tablet Oral Daily PRN Jaja E Vincent, DO        acetaminophen (TYLENOL) tablet 650 mg  650 mg Oral Q6H PRN Jaja E Vincent, DO        Or    acetaminophen (TYLENOL) suppository 650 mg  650 mg Rectal Q6H PRN Jaja E Vincent, DO        aspirin EC tablet 325 mg  325 mg Oral Daily Jaja E Vincent, DO        atorvastatin (LIPITOR) tablet 20 mg  20 mg Oral Daily Jaja E Vincent, DO        [Held by provider] citalopram (CELEXA) tablet 10 mg  10 mg Oral Daily Jaja E Vincent, DO        clopidogrel (PLAVIX) tablet 75 mg  75 mg Oral Daily Jaja E Vincent, DO        [Held by provider] insulin glargine (LANTUS) injection vial 43 Units  43 Units Subcutaneous Nightly Jaja E Vincent, DO        levothyroxine (SYNTHROID) tablet 150 mcg  150 mcg Oral Daily Jaja E Vincent, DO        montelukast (SINGULAIR) tablet 10 mg  10 mg Oral Nightly Jaja E Vincent, DO        antioxidant multivitamin (OCUVITE) tablet  1 tablet Oral BID Jaja E Vincent, DO        oxyCODONE-acetaminophen (PERCOCET) 5-325 MG per tablet 1 tablet  1 tablet Oral Q4H PRN Jaja E Vincent, DO        pantoprazole (PROTONIX) tablet 40 mg  40 mg Oral QAM AC Jaja E Vincent, DO        polyvinyl alcohol (LIQUIFILM TEARS) 1.4 % ophthalmic solution 1 drop  1 drop Left Eye PRN Jaja E Vincent, DO        insulin lispro (HUMALOG) injection vial 0-6 Units  0-6 Units Subcutaneous TID WC Jaja E Vincent, DO        would continue these at this time. Also continue to optimize blood sugar and blood pressure. Has been started on Lovenox 120 mg twice a day, no beta-blocker with previous bradycardia. Continue atorvastatin 20 mg daily. Would make him n.p.o. after midnight and plan Lexiscan stress test for tomorrow morning for further restratification. If large area of stress-induced ischemia noted would consider possible heart catheterization. #2 hypoxia and shortness of breath and to undergo echocardiogram today to assess LV function and valves. BNP 6100 on admission. Would give some IV diuretic, Lasix 40 mg IV twice a day. He did receive 1 mg of Bumex yesterday but really did not urinate much. Today creatinine 1.1 with a BUN of 22. #3 atrial fibrillation yesterday but appears to be back in normal sinus rhythm today. For now continue aspirin and Plavix. #4 diabetes and follow-up blood sugar. Diabetic education reviewed with him. #5 bradycardia previously documented in the hospital so would avoid adding metoprolol, beta-blocker at this time.             Electronically signed by Kevan Perez MD on 3/22/2021 at 11:17 AM Fluconazole Pregnancy And Lactation Text: This medication is Pregnancy Category C and it isn't know if it is safe during pregnancy. It is also excreted in breast milk.

## 2021-03-23 NOTE — CARE COORDINATION
Social work / Discharge Planning:       COVID NEGATIVE 3/21/21. Discharge plan is to return to [facility]. No precert needed prior to discharge. Awaiting PT/OT evaluations which facility will need prior to return. N-17 and ambulance form completed.    Electronically signed by PIOTR Aragon on 3/23/2021 at 10:12 AM

## 2021-03-24 ENCOUNTER — APPOINTMENT (OUTPATIENT)
Dept: MRI IMAGING | Age: 71
DRG: 280 | End: 2021-03-24
Payer: MEDICARE

## 2021-03-24 LAB
ALBUMIN SERPL-MCNC: 3 G/DL (ref 3.5–5.2)
ALP BLD-CCNC: 96 U/L (ref 40–129)
ALT SERPL-CCNC: 18 U/L (ref 0–40)
ANION GAP SERPL CALCULATED.3IONS-SCNC: 10 MMOL/L (ref 7–16)
AST SERPL-CCNC: 20 U/L (ref 0–39)
BASOPHILS ABSOLUTE: 0.07 E9/L (ref 0–0.2)
BASOPHILS RELATIVE PERCENT: 1 % (ref 0–2)
BILIRUB SERPL-MCNC: 0.9 MG/DL (ref 0–1.2)
BUN BLDV-MCNC: 22 MG/DL (ref 8–23)
CALCIUM SERPL-MCNC: 8.2 MG/DL (ref 8.6–10.2)
CHLORIDE BLD-SCNC: 96 MMOL/L (ref 98–107)
CO2: 29 MMOL/L (ref 22–29)
CREAT SERPL-MCNC: 1.1 MG/DL (ref 0.7–1.2)
EOSINOPHILS ABSOLUTE: 0.13 E9/L (ref 0.05–0.5)
EOSINOPHILS RELATIVE PERCENT: 1.9 % (ref 0–6)
GFR AFRICAN AMERICAN: >60
GFR NON-AFRICAN AMERICAN: >60 ML/MIN/1.73
GLUCOSE BLD-MCNC: 132 MG/DL (ref 74–99)
HCT VFR BLD CALC: 36 % (ref 37–54)
HEMOGLOBIN: 11 G/DL (ref 12.5–16.5)
IMMATURE GRANULOCYTES #: 0.01 E9/L
IMMATURE GRANULOCYTES %: 0.1 % (ref 0–5)
LYMPHOCYTES ABSOLUTE: 1.94 E9/L (ref 1.5–4)
LYMPHOCYTES RELATIVE PERCENT: 28.7 % (ref 20–42)
MCH RBC QN AUTO: 24.3 PG (ref 26–35)
MCHC RBC AUTO-ENTMCNC: 30.6 % (ref 32–34.5)
MCV RBC AUTO: 79.6 FL (ref 80–99.9)
METER GLUCOSE: 118 MG/DL (ref 74–99)
METER GLUCOSE: 154 MG/DL (ref 74–99)
METER GLUCOSE: 179 MG/DL (ref 74–99)
MONOCYTES ABSOLUTE: 0.88 E9/L (ref 0.1–0.95)
MONOCYTES RELATIVE PERCENT: 13 % (ref 2–12)
NEUTROPHILS ABSOLUTE: 3.72 E9/L (ref 1.8–7.3)
NEUTROPHILS RELATIVE PERCENT: 55.3 % (ref 43–80)
PDW BLD-RTO: 15.3 FL (ref 11.5–15)
PLATELET # BLD: 272 E9/L (ref 130–450)
PMV BLD AUTO: 10.2 FL (ref 7–12)
POTASSIUM REFLEX MAGNESIUM: 4.2 MMOL/L (ref 3.5–5)
RBC # BLD: 4.52 E12/L (ref 3.8–5.8)
SODIUM BLD-SCNC: 135 MMOL/L (ref 132–146)
TOTAL PROTEIN: 6.1 G/DL (ref 6.4–8.3)
WBC # BLD: 6.8 E9/L (ref 4.5–11.5)

## 2021-03-24 PROCEDURE — 2060000000 HC ICU INTERMEDIATE R&B

## 2021-03-24 PROCEDURE — 6360000002 HC RX W HCPCS: Performed by: INTERNAL MEDICINE

## 2021-03-24 PROCEDURE — 85025 COMPLETE CBC W/AUTO DIFF WBC: CPT

## 2021-03-24 PROCEDURE — 94660 CPAP INITIATION&MGMT: CPT

## 2021-03-24 PROCEDURE — 94640 AIRWAY INHALATION TREATMENT: CPT

## 2021-03-24 PROCEDURE — 2580000003 HC RX 258: Performed by: INTERNAL MEDICINE

## 2021-03-24 PROCEDURE — 70551 MRI BRAIN STEM W/O DYE: CPT

## 2021-03-24 PROCEDURE — 92526 ORAL FUNCTION THERAPY: CPT | Performed by: SPEECH-LANGUAGE PATHOLOGIST

## 2021-03-24 PROCEDURE — 2700000000 HC OXYGEN THERAPY PER DAY

## 2021-03-24 PROCEDURE — 6370000000 HC RX 637 (ALT 250 FOR IP): Performed by: INTERNAL MEDICINE

## 2021-03-24 PROCEDURE — 82962 GLUCOSE BLOOD TEST: CPT

## 2021-03-24 PROCEDURE — 80053 COMPREHEN METABOLIC PANEL: CPT

## 2021-03-24 PROCEDURE — 36415 COLL VENOUS BLD VENIPUNCTURE: CPT

## 2021-03-24 RX ORDER — FUROSEMIDE 20 MG/1
20 TABLET ORAL DAILY
Qty: 30 TABLET | Refills: 0 | Status: ON HOLD | DISCHARGE
Start: 2021-03-24 | End: 2021-08-12 | Stop reason: HOSPADM

## 2021-03-24 RX ORDER — SENNA PLUS 8.6 MG/1
1 TABLET ORAL DAILY PRN
DISCHARGE
Start: 2021-03-24 | End: 2021-04-23

## 2021-03-24 RX ADMIN — MICONAZOLE NITRATE: 2 POWDER TOPICAL at 09:32

## 2021-03-24 RX ADMIN — INSULIN LISPRO 1 UNITS: 100 INJECTION, SOLUTION INTRAVENOUS; SUBCUTANEOUS at 06:16

## 2021-03-24 RX ADMIN — ENOXAPARIN SODIUM 120 MG: 120 INJECTION SUBCUTANEOUS at 09:28

## 2021-03-24 RX ADMIN — ENOXAPARIN SODIUM 120 MG: 120 INJECTION SUBCUTANEOUS at 19:34

## 2021-03-24 RX ADMIN — BUDESONIDE 500 MCG: 0.5 SUSPENSION RESPIRATORY (INHALATION) at 08:58

## 2021-03-24 RX ADMIN — PANTOPRAZOLE SODIUM 40 MG: 40 TABLET, DELAYED RELEASE ORAL at 06:16

## 2021-03-24 RX ADMIN — Medication 10 ML: at 09:28

## 2021-03-24 RX ADMIN — DEXTROSE MONOHYDRATE 50 ML/HR: 50 INJECTION, SOLUTION INTRAVENOUS at 06:14

## 2021-03-24 RX ADMIN — MICONAZOLE NITRATE: 2 POWDER TOPICAL at 19:35

## 2021-03-24 RX ADMIN — LEVOTHYROXINE SODIUM 150 MCG: 75 TABLET ORAL at 06:16

## 2021-03-24 RX ADMIN — FUROSEMIDE 40 MG: 10 INJECTION, SOLUTION INTRAMUSCULAR; INTRAVENOUS at 09:28

## 2021-03-24 RX ADMIN — ARFORMOTEROL TARTRATE 15 MCG: 15 SOLUTION RESPIRATORY (INHALATION) at 08:58

## 2021-03-24 RX ADMIN — Medication 1 TABLET: at 19:35

## 2021-03-24 RX ADMIN — MONTELUKAST SODIUM 10 MG: 10 TABLET, FILM COATED ORAL at 19:35

## 2021-03-24 RX ADMIN — Medication 10 ML: at 19:34

## 2021-03-24 NOTE — DISCHARGE INSTR - COC
Continuity of Care Form    Patient Name: Aurora Lanza   :  1950  MRN:  [id number]    Admit date:  3/21/2021  Discharge date:  3/25/21    Code Status Order: DNR-CCA   Advance Directives:   [facility] Documentation       Date/Time Healthcare Directive Type of Healthcare Directive Copy in [facility] [address] Agent's Name Healthcare Agent's Phone Number    21  No, patient does not have an advance directive for healthcare treatment -- -- -- -- --            Admitting Physician:  Sasha Gil DO  PCP: Sasha Gil DO    Discharging Nurse: [facility] Unit/Room#: 8241/1594-J  Discharging Unit Phone Number: [phone]    Emergency Contact:   Extended Emergency Contact Information  Primary Emergency Contact: Evgeny Arce  Address: [facility]            [address] Phone: [phone]  Mobile Phone: [phone]  Relation: Brother/Sister  Secondary Emergency Contact: Martha Renteria  Address: [address] [place] [address] Phone: [phone]  Relation: Brother/Sister    Past Surgical History:  Past Surgical History:   Procedure Laterality Date    CARDIAC SURGERY      CORONARY ANGIOPLASTY WITH STENT PLACEMENT  2011    EYE SURGERY Right 2015    right eye cataract extraction with intraocular lens  implant    KNEE SURGERY      OTHER SURGICAL HISTORY Left 2015    excision and debridement left groin and scrotom    OTHER SURGICAL HISTORY  3/2/15    perineal wound check    OTHER SURGICAL HISTORY Left 3/2/2015    incision and drainage of scrotal abscesses and left fortino abscess by Dr Toshia Sorto       Immunization History: There is no immunization history on file for this patient.     Active Problems:  Patient Active Problem List   Diagnosis Code    CAD (coronary artery disease) I25.10    Diabetes mellitus ([facility] [address] E11.9    Hyperlipidemia E78.5    DEMARCUS (obstructive sleep apnea) G47.33    Tobacco abuse Z72.0    COPD (chronic obstructive pulmonary disease) ([facility]) J44.9    ASHD (arteriosclerotic heart disease) I25.10    History of stroke Z86.73    Bilateral carotid artery stenosis I65.23    Acute respiratory failure with hypoxia ([facility]) J96.01    Acute on chronic congestive heart failure with left ventricular diastolic dysfunction ([facility]) I50.33    NSTEMI (non-ST elevated myocardial infarction) ([facility]) I21.4       Isolation/Infection:   Isolation            No Isolation          Patient Infection Status       Infection Onset Added Last Indicated Last Indicated By Review Planned Expiration Resolved Resolved By    None active    Resolved    COVID-19 Rule Out 21 COVID-19, Rapid (Ordered)   21 Rule-Out Test Resulted    COVID-19 20 COVID-19   20     Detected 2020 outside lab per [facility]    COVID-19 Rule Out 20 COVID-19 (Ordered)   20 Rule-Out Test Resulted    ESBL (Extended Spectrum Beta Lactamase)  04/06/15 04/06/15 Osbaldo Ann RN   08/18/15 Osbaldo Ann RN    4/2/15 Klebsiella Pneumoniae urine            Nurse Assessment:  Last Vital Signs: BP (!) 121/39   Pulse 74   Temp 98.6 °F (37 °C) (Oral)   Resp 18   Ht 5' 7\" (1.702 m)   Wt 247 lb 11.2 oz (112.4 kg)   SpO2 95%   BMI 38.80 kg/m²     Last documented pain score (0-10 scale): Pain Level: 0  Last Weight:   Wt Readings from Last 1 Encounters:   21 247 lb 11.2 oz (112.4 kg)     Mental Status:  disoriented to time and situation, oriented person and place.     IV Access:  - None    Nursing Mobility/ADLs:  Walking   Dependent  Transfer  Dependent  Bathing  Dependent  Dressing  Dependent  Toileting  Dependent  Feeding  Assisted  Med Admin  Assisted  Med Delivery   whole    Wound Care Documentation and Therapy:        Elimination:  Continence:   · Bowel: No  · Bladder: No  Urinary Catheter: None   Colostomy/Ileostomy/Ileal Conduit: No       Date of Last BM: 3/25/21    Intake/Output Summary (Last 24 hours) at 3/24/2021 0630  Last data filed at 3/24/2021 0109  Gross per 24 hour   Intake 2177 ml   Output 2925 ml   Net -748 ml     I/O last 3 completed shifts:  In: -   Out: 850 [Urine:850]    Safety Concerns:     Aspiration Risk    Impairments/Disabilities:      None    Nutrition Therapy:  Current Nutrition Therapy:   - Oral Diet:  Dysphagia 3 advanced    Routes of Feeding: Oral  Liquids: No Restrictions  Daily Fluid Restriction: no  Last Modified Barium Swallow with Video (Video Swallowing Test): 3/22/21    Treatments at the Time of Hospital Discharge:   Respiratory Treatments: ***  Oxygen Therapy:  is on oxygen at 1 L/min per nasal cannula. Ventilator:    - BiPAP   IPAP: 16 cmH20, CPAP/EPAP: 8 cmH2O only when sleeping    Rehab Therapies: Physical Therapy and Occupational Therapy  Weight Bearing Status/Restrictions: No weight bearing restirctions  Other Medical Equipment (for information only, NOT a DME order):  wheelchair  Other Treatments:     Patient's personal belongings (please select all that are sent with patient):  None    RN SIGNATURE:  Electronically signed by Mireya Singh RN on 3/25/21 at 12:17 PM EDT    CASE MANAGEMENT/SOCIAL WORK SECTION    Inpatient Status Date: ***    Readmission Risk Assessment Score:  Readmission Risk              Risk of Unplanned Readmission:        25           Discharging to Facility/ Agency   · Name:   · Address:  · Phone:  · Fax:    Dialysis Facility (if applicable)   · Name:  · Address:  · Dialysis Schedule:  · Phone:  · Fax:    / signature: {Esignature:[id number]:::0}    PHYSICIAN SECTION    Prognosis: Fair    Condition at Discharge: Stable    Rehab Potential (if transferring to Rehab):  Fair    Recommended Labs or Other Treatments After Discharge: none    Physician Certification: I certify the above information and transfer of Trinidad Cure  is necessary for the continuing treatment of the diagnosis listed and that he requires [place] for greater 30 days.      Update Admission H&P: No change in H&P    PHYSICIAN SIGNATURE:  Electronically signed by Cecil Palomo DO on 3/24/21 at 6:31 AM EDT

## 2021-03-24 NOTE — PROGRESS NOTES
Seeing patient for CARDIOLOGY PROGRESS NOTE  [facility]        Subjective:  [facility] seeing patient for admission for shortness of breath and ruled in for non-ST elevation myocardial infarction with known CAD.,  Moderate aortic valve stenosis, PAF    On admission was actually in atrial fibrillation but converted back to normal sinus rhythm in the first 12 hours. His Sister Helen Enciso is at bedside throughout the interview today. He appears to be comfortable in no distress and denies any shortness of breath or chest pain. I reviewed the findings of the Lexiscan stress test with him and his sister that was completed yesterday that shows normal LVEF, small infarction and no significant ischemia. Objective: Labs chart medications and telemetry all reviewed. Patient Vitals for the past 24 hrs:   BP Temp Temp src Pulse Resp SpO2 Weight   03/24/21 0858 -- -- -- -- 18 92 % --   03/24/21 0809 (!) 140/64 98.5 °F (36.9 °C) Oral 88 20 (!) 86 % --   03/24/21 0600 -- -- -- -- -- -- 247 lb 11.2 oz (112.4 kg)   03/23/21 2245 (!) 121/39 98.6 °F (37 °C) Oral 74 18 95 % --   03/23/21 2023 -- -- -- -- -- 96 % --   03/23/21 1500 (!) 127/54 98.4 °F (36.9 °C) Axillary 74 20 94 % --         Intake/Output Summary (Last 24 hours) at 3/24/2021 1456  Last data filed at 3/24/2021 0411  Gross per 24 hour   Intake 2427 ml   Output 3125 ml   Net -698 ml       Wt Readings from Last 3 Encounters:   03/24/21 247 lb 11.2 oz (112.4 kg)   01/18/21 250 lb (113.4 kg)   12/12/20 257 lb (116.6 kg)       Telemetry: I personally reviewed and shows normal sinus rhythm heart rate in the 60s.     Current meds: Scheduled Meds:   furosemide  40 mg Intravenous Daily    sodium chloride flush  10 mL Intravenous 2 times per day    enoxaparin  1 mg/kg Subcutaneous BID    aspirin  325 mg Oral Daily    atorvastatin  20 mg Oral Daily    [Held by provider] citalopram  10 mg Oral Daily    clopidogrel  75 mg Oral Daily    [Held by provider] insulin glargine  43 Units Subcutaneous Nightly    levothyroxine  150 mcg Oral Daily    montelukast  10 mg Oral Nightly    ocuvite-lutein  1 tablet Oral BID    pantoprazole  40 mg Oral QAM AC    insulin lispro  0-6 Units Subcutaneous TID WC    insulin lispro  0-3 Units Subcutaneous Nightly    Arformoterol Tartrate  15 mcg Nebulization BID    And    budesonide  0.5 mg Nebulization BID    miconazole   Topical BID     Continuous Infusions:   dextrose Stopped (03/24/21 0629)     PRN Meds:.sodium chloride flush, potassium chloride **OR** potassium alternative oral replacement **OR** potassium chloride, senna, acetaminophen **OR** acetaminophen, oxyCODONE-acetaminophen, polyvinyl alcohol, glucose, dextrose, glucagon (rDNA), dextrose, ipratropium-albuterol, ammonium lactate    Allergies: Patient has no known allergies. Labs:   Recent Labs     03/22/21 0245 03/23/21 0345 03/24/21 0205   WBC 6.4 6.0 6.8   HGB 11.7* 10.6* 11.0*   HCT 40.7 36.1* 36.0*   MCV 84.1 82.4 79.6*    246 272       Labs:   Recent Labs     03/22/21 0245 03/23/21 0345 03/24/21  0205    137 135   K 4.9 4.6 4.2    101 96*   CO2 30* 29 29   BUN 22 28* 22   CREATININE 1.1 1.3* 1.1       Labs:   Recent Labs     03/21/21 2030 03/22/21 0245   TROPONINI 0.23* 0.50*       Labs: No results for input(s): BNP in the last 72 hours. Labs: No results for input(s): CHOL, HDL, TRIG in the last 72 hours. Invalid input(s): Tarah Wilder    Labs:   Recent Labs     03/22/21 0245 03/23/21 0345 03/24/21  0205   PROT 6.7 5.9* 6.1*       Review of systems: No reported significant weight gain or weight loss. no dysuria or frequency, no dizziness, falls or trauma, no change in bowel or bladder habits, no hematochezia, hemoptysis or hematuria. No fevers, chills, nausea or vomiting reported. No significant wheezing or sputum production. No headache or visual changes. The remainder of the 10 review of systems otherwise negative. Exam      General: Patient comfortable in no distress and currently denies any chest pain. HEENT: Face symmetrical and no apparent cranial nerve deficit. Neck: No jugular venous distention, carotid bruit or thyromegaly. Lungs: Clear bilaterally without rales, wheezes or dullness. Cardiac: Regular rate and rhythm, no S3, S4, no rub or gallop. Abdomen: No rebound or guarding, no hepatosplenomegaly. Extremities: Without significant clubbing , cyanosis, or edema. Neuro:  No focal motor or sensory deficit apparent. Skin: No petechiae, no significant bruising. Assessment:See plan below        Plan: #1 shortness of breath on admission and troponin elevation non-ST ovation myocardial infarction and continue medical management with stress test yesterday showing no significant ischemia and evidence of fixed inferior defect. LV function normal.    #2 hypoxia and shortness of breath  Require intermittent oxygen extended-care facility status post Covid December 2020. Currently denies any sputum production. #3 moderate aortic valve stenosis is stable from prior echocardiogram.  Normal LVEF    #4 PAF now normal sinus rhythm. #5 diabetes and follow-up blood sugars. Continue to optimize long-term dietary changes. #6 chronic CAD prior percutaneous intervention optimizing blood pressure, blood sugar and lipids long-term. Please call if any further cardiovascular questions otherwise plan outpatient follow-up [facility] cardiology in 1 month.         Electronically signed by Moira Todd MD on 3/24/2021 at 2:56 PM [facility]

## 2021-03-24 NOTE — DISCHARGE SUMMARY
Internal Medicine Discharge Summary    NAME: Franko Bernardo :  1950  MRN:  [id number] PCP:Dung Ayala DO    ADMITTED: 3/21/2021   DISCHARGED: 3/25/2021  2:16 PM    ADMITTING PHYSICIAN: James Parson DO    PCP: James Parson DO    CONSULTANT(S):   IP CONSULT TO PRIMARY CARE PROVIDER  IP CONSULT TO CARDIOLOGY  IP CONSULT TO RESPIRATORY CARE     ADMITTING DIAGNOSIS:   Acute on chronic congestive heart failure with left ventricular diastolic dysfunction (HCC) [I50.33]     Please see H&P for further details    DISCHARGE DIAGNOSES:   Active Hospital Problems    Diagnosis    NSTEMI (non-ST elevated myocardial infarction) ([facility] [I21.4]     Priority: High    Acute on chronic congestive heart failure with left ventricular diastolic dysfunction (HCC) [I50.33]     Priority: Medium    Acute respiratory failure with hypoxia (HCC) [J96.01]     Priority: Medium    CAD (coronary artery disease) [I25.10]     Priority: Medium    Bilateral carotid artery stenosis [I65.23]    History of stroke [Z86.73]    ASHD (arteriosclerotic heart disease) [I25.10]    COPD (chronic obstructive pulmonary disease) ([facility] [J44.9]    Tobacco abuse [Z72.0]    DEMARCUS (obstructive sleep apnea) [G47.33]    Hyperlipidemia [E78.5]    Diabetes mellitus ([facility] [E11.9]       BRIEF HISTORY OF PRESENT ILLNESS: Franko Bernardo is a 66 y.o. 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 who originally had concerns including Altered Mental Status (From lashaun BS-60 1 amp given). at presentation on 3/21/2021, and was found to have Acute on chronic congestive heart failure with left ventricular diastolic dysfunction ([facility] [I50.33] after workup.     Please see H&P for further details. HOSPITAL COURSE:   The patient presented to the hospital with the chief complaint of Altered Mental Status (From capricpipo, BS-60 1 amp given). The patient was admitted to the hospital.     · NSTEMI/CHF/hypoxia-known history of CAD: Cardiology put appreciated- sp stress test 3/23-- no plans for heart cath. Continue Imdur/statin. Therapeutic Lovenox stopped. ASA/Plavix. Echo noted. Follow I&O's. PO Diuresis. · DM, now with hypoglycemia: Holding Lantus. SSI. HbA1c 5.9%. D10W stopped  · Altered mental status-likely metabolic encephalopathy: CT head negative. Marked improvement 3/23. · Dysphagia: SLP eval appreciated. MBS noted-- diet altered. · PT/OT-- 12/24  · DC'ed back to [facility]-- I will follow     BRIEF PHYSICAL EXAMINATION AND LABORATORIES ON DAY OF DISCHARGE:  VITALS:  /62   Pulse 64   Temp 98.2 °F (36.8 °C) (Oral)   Resp 19   Ht 5' 7\" (1.702 m)   Wt 248 lb 12.8 oz (112.9 kg)   SpO2 96%   BMI 38.97 kg/m²     Please see note from the same day.      LABS[de-identified]  Recent Labs     03/23/21 0345 03/24/21 0205 03/25/21 0215    135 140   K 4.6 4.2 4.2    96* 101   CO2 29 29 31*   BUN 28* 22 19   CREATININE 1.3* 1.1 1.1   GLUCOSE 98 132* 117*   CALCIUM 8.1* 8.2* 8.3*     Recent Labs     03/23/21 0345 03/24/21 0205 03/25/21 0215   ALKPHOS 86 96 111   ALT 18 18 28   AST 22 20 36   PROT 5.9* 6.1* 6.3*   BILITOT 0.8 0.9 0.8   LABALBU 3.1* 3.0* 3.4*     Recent Labs     03/23/21 0345 03/24/21 0205 03/25/21 0215   WBC 6.0 6.8 6.3   RBC 4.38 4.52 4.71   HGB 10.6* 11.0* 11.3*   HCT 36.1* 36.0* 37.8   MCV 82.4 79.6* 80.3   MCH 24.2* 24.3* 24.0*   MCHC 29.4* 30.6* 29.9*   RDW 15.8* 15.3* 15.3*    272 302   MPV 10.3 10.2 10.4     Lab Results   Component Value Date    LABA1C 5.9 (H) 03/23/2021    LABA1C 12.1 (H) 02/05/2015    LABA1C 11.5 (H) 06/03/2014     Lab Results   Component Value Date    INR 1.2 03/21/2021    INR 1.0 12/16/2020    INR 1.0 12/15/2020    PROTIME 13.0 (H) 03/21/2021    PROTIME 12.4 12/16/2020    PROTIME 12.2 12/15/2020      Lab Results   Component Value Date    TSH 12.220 (H) 03/25/2015    TSH 0.263 (L) 12/19/2011     Lab Results   Component Value Date    TRIG 60 12/19/2011    HDL 36.0 (A) 12/19/2011    LDLCALC 77 12/19/2011     No results for input(s): MG in the last 72 hours. No results for input(s): CKTOTAL, CKMB, TROPONINI in the last 72 hours. No results for input(s): LACTA in the last 72 hours. IMAGING:  Echo Complete    Result Date: 3/22/2021  Transthoracic Echocardiography Report (TTE)  Demographics   Patient Name        Carlos Leo Gender                Male   Medical Record      [id number]     Room Number           6567  Number   Account #           [de-identified]    Procedure Date        03/22/2021   Corporate ID                     Ordering Physician    Sabas Kraus MD   Accession Number    [id number]   Referring Physician   Richmond Julien. Volino DO   Date of Birth       1950   Sonographer           Martita TORRES   Age                 66 year(s)   Interpreting          [facility]                                   Physician             Sabas Kraus MD                                    Any Other  Procedure Type of Study   TTE procedure:Echo Complete W/Doppler & Color Flow. Procedure Date Date: 03/22/2021 Start: 08:15 AM Study Location: Portable Technical Quality: Poor visualization due to body habitus. Indications:Abnormal cardiac enzymes and Aortic stenosis. Patient Status: Routine Contrast Medium: Definity. Height: 69 inches Weight: 250 pounds BSA: 2.27 m^2 BMI: 36.92 kg/m^2 HR: 66 bpm BP: 122/58 mmHg  Findings   Left Ventricle  LV Ejection fraction is visually estimated at 60%. Mild left ventricular concentric hypertrophy noted. Right Ventricle  Normal right ventricle structure and function. Left Atrium  The left atrium is mildly dilated. Right Atrium  Normal right atrium.    Mitral Valve  Physiologic and/or trace mitral regurgitation is present. No evidence of hemodynamically significant mitral stenosis. Tricuspid Valve  Normal tricuspid valve structure and function. Aortic Valve  The aortic valve appears moderately sclerotic. Moderate aortic valve stenosis. No evidence of aortic valve regurgitation. Pulmonic Valve  Normal pulmonic valve structure and function. Pericardial Effusion  No evidence for hemodynamically significant pericardial effusion. Pleural Effusion  No evidence of pleural effusion. Aorta  Normal aortic root and ascending aorta. Miscellaneous  The inferior vena cava diameter is normal with normal respiratory  variation. Conclusions   Summary  The left atrium is mildly dilated. LV Ejection fraction is visually estimated at 60%. Mild left ventricular concentric hypertrophy noted. The aortic valve appears moderately sclerotic. Moderate aortic valve stenosis. No evidence of aortic valve regurgitation.    Signature   ----------------------------------------------------------------  Electronically signed by Dulce Lopez MD(Interpreting  physician) on 03/22/2021 11:47 AM  ----------------------------------------------------------------  M-Mode/2D Measurements & Calculations   LV Diastolic    LV Systolic Dimension: 3.1   AV Cusp Separation: 0.9 cmLA  Dimension: 4.4  cm                           Dimension: 4.7 cmAO Root  cm              LV Volume Diastolic: 90.2 ml Dimension: 2.8 cm  LV FS:29.6 %    LV Volume Systolic: 91.9 ml  LV PW           LV EDV/LV EDV Index: 98.8  Diastolic: 1.2  FA/96 SH/Z^7AB ESV/LV ESV  cm              Index: 36.5 ml/16ml/ m^2     RV Diastolic Dimension: 3.3  LV PW Systolic: EF Calculated: 58 %          cm  1.5 cm          LV Mass Index: 84 l/min*m^2  Septum                                       LA/Aorta: 5.79  Diastolic: 1.2                               Ascending Aorta: 2.8 cm  cm              LVOT: 2 cm                   LA volume/Index: 83 ml  Septum dose to as low as reasonably achievable. COMPARISON: Brain MRI September 24, 2020 HISTORY: ORDERING SYSTEM PROVIDED HISTORY: Trauma TECHNOLOGIST PROVIDED HISTORY: Has a \"code stroke\" or \"stroke alert\" been called? ->No Reason for exam:->Trauma Decision Support Exception->Emergency Medical Condition (MA) FINDINGS: BRAIN/VENTRICLES: There is no acute intracranial hemorrhage, mass effect or midline shift. No abnormal extra-axial fluid collection. The gray-white differentiation is maintained without evidence of an acute infarct. There is no evidence of hydrocephalus. ORBITS: The visualized portion of the orbits demonstrate no acute abnormality. SINUSES: The visualized paranasal sinuses and mastoid air cells demonstrate no acute abnormality. SOFT TISSUES/SKULL:  No acute abnormality of the visualized skull or soft tissues. No acute intracranial abnormality. No intracranial hemorrhage. Ct Cervical Spine Wo Contrast    Result Date: 3/21/2021  EXAMINATION: CT OF THE CERVICAL SPINE WITHOUT CONTRAST 3/21/2021 2:29 pm TECHNIQUE: CT of the cervical spine was performed without the administration of intravenous contrast. Multiplanar reformatted images are provided for review. Dose modulation, iterative reconstruction, and/or weight based adjustment of the mA/kV was utilized to reduce the radiation dose to as low as reasonably achievable. COMPARISON: None. HISTORY: ORDERING SYSTEM PROVIDED HISTORY: ams TECHNOLOGIST PROVIDED HISTORY: Reason for exam:->ams Decision Support Exception->Emergency Medical Condition (MA) FINDINGS: BONES/ALIGNMENT: There is no acute fracture or traumatic malalignment. DEGENERATIVE CHANGES: Multilevel osteophytes and disc space narrowing noted, most prominent in the lower cervical spine SOFT TISSUES: There is no prevertebral soft tissue swelling. Bilateral pleural effusions noted. There is a calcified left thyroid nodule. No acute abnormality of the cervical spine.      Xr Chest Portable    Result Date: 3/21/2021  EXAMINATION: ONE XRAY VIEW OF THE CHEST 3/21/2021 2:10 pm COMPARISON: Chest radiograph December 12, 2020 HISTORY: ORDERING SYSTEM PROVIDED HISTORY: hypoxia, ams TECHNOLOGIST PROVIDED HISTORY: Reason for exam:->hypoxia, ams FINDINGS: The patient is rotated, limiting the evaluation. The left costophrenic angles partially excluded. The cardiomediastinal silhouette is likely stable in size. There are bilateral airspace opacities in diffusely in the lungs. No pneumothorax. No large pleural effusions. Bilateral airspace opacities may represent pulmonary edema or diffuse atelectasis. Repeat chest radiograph may be obtained if clinically warranted. Us Dup Lower Extremity Right Vargas    Result Date: 3/22/2021  EXAMINATION: DUPLEX VENOUS ULTRASOUND OF THE RIGHT LOWER EXTREMITY, 3/22/2021 6:42 pm TECHNIQUE: Duplex ultrasound using B-mode/gray scaled imaging and Doppler spectral analysis and color flow was obtained of the right lower extremity. COMPARISON: None. HISTORY: ORDERING SYSTEM PROVIDED HISTORY: r/o DVT TECHNOLOGIST PROVIDED HISTORY: Reason for exam:->r/o DVT What reading provider will be dictating this exam?->CRC FINDINGS: The visualized veins of the right lower extremity are patent and free of echogenic thrombus. The veins demonstrate good compressibility with normal color flow study and spectral analysis. No evidence of DVT in the right lower extremity. Mri Brain Wo Contrast    Result Date: 3/24/2021  EXAMINATION: MRI OF THE BRAIN WITHOUT CONTRAST  3/24/2021 5:04 pm TECHNIQUE: Multiplanar multisequence MRI of the brain was performed without the administration of intravenous contrast. COMPARISON: CT head without contrast, 03/21/2021.  HISTORY: ORDERING SYSTEM PROVIDED HISTORY: Hx of AMS, eval for evidence of ischemic stroke TECHNOLOGIST PROVIDED HISTORY: Reason for exam:->Hx of AMS, eval for evidence of ischemic stroke FINDINGS: INTRACRANIAL STRUCTURES/VENTRICLES: There is no acute infarct. A small chronic infarction is seen in the left occipital lobe. No mass effect, edema or hemorrhage is seen. Mild-to-moderate volume loss and mild chronic microvascular ischemic changes are seen in the brain. The skull base arterial flow voids are intact. No hydrocephalus or extra-axial fluid is seen. ORBITS: Prosthetic lenses are seen in the globes bilaterally. The orbits are otherwise grossly unremarkable. SINUSES: Mild mucoperiosteal thickening in the ethmoid sinuses. The paranasal sinuses are otherwise clear. The mastoid air cells are clear BONES/SOFT TISSUES: There is asymmetry of the right anterior calvarium resulting in frontal bossing on the right. 1.  No acute intracranial abnormality. 2. Small chronic left occipital lobe infarction. 3. 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 Nm Cardiac Stress Test Nuclear Imaging    Result Date: 3/23/2021  Indication:  Elevated troponin Clinical History:   Patient has history of coronary artery disease and a PCI. IMAGING: Myocardial perfusion imaging was performed at rest 30-35 minutes following the intravenous injection of 9.9 mCi of (Tc-Sestamibi) followed by 10 ml of Normal Saline. At peak exercise, the patient was injected intravenously with 30. 1mCi of (Tc-Sestamibi) followed by 10 ml of Normal Saline. Gated post-stress tomographic imaging was performed 20-25 minutes after stress. FINDINGS: The overall quality of the study was good. Left ventricular cavity size was noted to be mildly dilated during stress Rotational analog analysis demonstrated no motion artifact The gated SPECT stress imaging in the short, vertical long, and horizontal long axis demonstrated normal homogeneous tracer distribution throughout the myocardium. A moderatedefect was present in the inferior wall(s) that was moderatesized by quantification.          There also was a mild defect present in the inferoapical  wall(s) that was small  sized by quantification. The resting images no significant reversibility Gated SPECT left ventricular ejection fraction was calculated to be 58%%, with normal Myocardial wall motion. The myocardial perfusion imaging was abnormal The abnormality was a fixed inferior and inferoapical defect without reversibility. Overall left ventricular systolic function was normal, EF 58% Intermediate risk myocardial perfusion study. No prior stress test is available for comparison. Derek Betancur MD, [facility]     Fl Modified Barium Swallow W Video    Result Date: 3/22/2021  EXAMINATION: MODIFIED BARIUM SWALLOW WAS PERFORMED IN CONJUNCTION WITH SPEECH PATHOLOGY SERVICES TECHNIQUE: Fluoroscopic evaluation of the swallowing mechanism was performed with multiple consistency of barium product. FLUOROSCOPY DOSE AND TYPE OR TIME AND EXPOSURES: Images: 2 Fluoroscopic time: 2 minutes Total dose: 14.47 mGy COMPARISON: None HISTORY: ORDERING SYSTEM PROVIDED HISTORY: ASPIRATION PNA TECHNOLOGIST PROVIDED HISTORY: Reason for exam:->ASPIRATION PNA FINDINGS: Satisfactory oral phase of the swallowing mechanism. Abnormal pharyngeal phase of the swallow with delayed initiation of the swallow reflex and with vallecular retention of barium. No findings laryngeal penetration with barium or barium aspiration. Mildly impaired swallowing mechanism including vallecular retention of barium. No barium aspiration. Please see separate speech pathology report for full discussion of findings and recommendations. MICROBIOLOGY:  BLOOD CX #1  No results for input(s): BC in the last 72 hours. BLOOD CX #2  No results for input(s): Jung Haste in the last 72 hours. TIP CULTURE  No results for input(s): CXCATHTIP in the last 72 hours. CULTURE, RESPIRATORY   No results for input(s): CULTRESP in the last 72 hours. RESPIRATORY SMEAR  No results for input(s): RESPSMEAR in the last 72 hours.      Echocardiogram 3/21/21  The left atrium is mildly dilated.   LV Ejection fraction is visually estimated at 60%. [address] left ventricular concentric hypertrophy noted.   The aortic valve appears moderately sclerotic.   Moderate aortic valve stenosis.   No evidence of aortic valve regurgitation. DISPOSITION:  The patient's condition is fair. The patient is being discharged to nursing home    DISCHARGE MEDICATIONS:    [facility] Medication Instructions ISX:[id number]    Printed on:03/25/21 1942   Medication Information                      acetaminophen 650 MG TABS  Take 650 mg by mouth every 4 hours as needed. albuterol-ipratropium (COMBIVENT RESPIMAT)  MCG/ACT AERS inhaler  Inhale 1 puff into the lungs every 4 hours as needed for Wheezing or Shortness of Breath             aspirin (ECOTRIN) 325 MG EC tablet  Take 325 mg by mouth daily. Do not crush             atorvastatin (LIPITOR) 20 MG tablet  Take 20 mg by mouth daily. budesonide-formoterol (SYMBICORT) 160-4.5 MCG/ACT AERO  Inhale 2 puffs into the lungs 2 times daily             citalopram (CELEXA) 10 MG tablet  Take 10 mg by mouth daily             clopidogrel (PLAVIX) 75 MG tablet  Take 1 tablet by mouth daily. divalproex (DEPAKOTE SPRINKLE) 125 MG capsule  Take 125 mg by mouth 2 times daily             furosemide (LASIX) 20 MG tablet  Take 1 tablet by mouth daily             glucagon, rDNA, 1 MG SOLR injection  Inject 1 mg into the muscle as needed for Low blood sugar (Blood glucose less than 70 mg/dL and patient NOT ALERT or NPO and does not have IV access. ). glucose (GLUTOSE) 40 % GEL  Take 15 g by mouth as needed.              insulin aspart (NOVOLOG FLEXPEN) 100 UNIT/ML injection pen  Inject 0-12 Units into the skin 3 times daily (before meals) 0-140= 0 units  141-180= 2 units  181-220= 4 units  221-260= 6 units  261-300= 8 units  301-340= 10 units  341+ = 12 units and notify md             isosorbide tablet, RSTEWART to [facility]             INTERNAL MEDICINE FOLLOW UP/INSTRUCTIONS:  · I will see him at the nursing home next week   · Follow-up with consultants as directed by them. · If recurrence or worsening of symptoms go to the ED or call primary care physician. · Diet: DIET DYSPHAGIA PUREED; No Added Salt (3-4 GM); Dysphagia Minced and Moist    Preparing for this patient's discharge, including paperwork, orders, instructions, and meeting with patient did required 35 minutes.     Electronically signed by Valdez Richmond DO on 3/25/2021 at 7:42 PM

## 2021-03-24 NOTE — PROGRESS NOTES
SPEECH LANGUAGE PATHOLOGY  DAILY PROGRESS NOTE        PATIENT NAME:  Dari Boston      :  1950          TODAY'S DATE:  3/24/2021 ROOM:  [address] [place] [address]-A        SWALLOWING  Chart reviewed. Pt in bed, alert. Sister present for today's session. Pt reports no difficulty swallowing on current diet, however, complains of texture. Noted that diet had not been changed after speech recommendation and pt continued to get puree diet. Pt accepted thin liquid by straw and puree by spoon with no clinical indications of aspiration noted. Trials of solid foods given; pt showed good toleration. Pt and sister report that pt consumed normal diet with edentoulous status prior to admit. Recommend diet upgrade. DYSPHAGIA DIAGNOSIS:  Mild oropharyngeal dysphagia; no aspiration or penetration observed during video swallow study                             DIET RECOMMENDATIONS:  Dysphagia 3, Soft/advanced (Soft & Bite-sized) solids with  thin liquids as tolerated-RN notified of diet change.                  FEEDING RECOMMENDATIONS:                           Assistance level:  Stand by assistance is needed during all oral intake                             Compensatory strategies recommended: Small bites/sips and Alternate solids and liquids        Oral- adequate labial seal and A-P transfer, no oral residue      Pharyngeal- No overt s/s of aspiration, no multiple swallows      Education- Pt/sister educated on results and POC. Pt/sister trained on compensatory strategies for safe swallow with good outcome. Questions answered. Will continue SP intervention as per previously established POC. OPAL Sesay. Speech-Language Pathology Student      Bryant HOLDEN. DENG/SLP [id number]  Speech Pathologist          CPT code(s) 22964  dysphagia tx  Total minutes :  15 minutes

## 2021-03-24 NOTE — PROGRESS NOTES
Patient is refusing his morning PO meds. Will take his SQ and IV meds though. Will continue to monitor.

## 2021-03-24 NOTE — PROGRESS NOTES
Called [facility] to see if we can get the patient over there this evening, the admitting staff told me that they do not have a bed ready for the patient and  needs to call and reconfirm. Will continue to monitor.

## 2021-03-24 NOTE — PROGRESS NOTES
Internal Medicine Progress Note    Patient's name: Opal Bonilla  : 1950  Chief complaints (on day of admission): Altered Mental Status (From lashaun, BS-60 1 amp given)  Admission date: 3/21/2021  Date of service: 3/24/2021   Room: [address] MED SURG  Primary care physician: Ezio Sanderson DO  Reason for visit: Follow-up for NSTEMI    Subjective  Bronston Post was seen and examined, he is laying in bed comfortably, awake, alert. Feels well, does have mild generalized pain that is unchanged from his baseline. Does not have any chest pain or shortness of breath. Nursing reported that he was attempting to grope female staff, asked patient if he did this and he admitted to it, said he was doing because he wanted to. Discussed with patient that this type of behavior is inappropriate. Discussed with patient results of stress test, plan for outpatient cardiology follow-up and continuation of his aspirin and Plavix. Review of Systems  There are no new complaints of chest pain, shortness of breath, abdominal pain, nausea, vomiting, diarrhea, constipation.     Hospital Medications  Current Facility-Administered Medications   Medication Dose Route Frequency Provider Last Rate Last Admin    furosemide (LASIX) injection 40 mg  40 mg Intravenous Daily Dung Ayala, DO        dextrose 10 % infusion   Intravenous Continuous Dung Ayala, DO        sodium chloride flush 0.9 % injection 10 mL  10 mL Intravenous 2 times per day Jaja E Vincent, DO   10 mL at 21 1520    sodium chloride flush 0.9 % injection 10 mL  10 mL Intravenous PRN Jaja E Vincent, DO   10 mL at 21 1836    potassium chloride (KLOR-CON M) extended release tablet 40 mEq  40 mEq Oral PRN Jaja E Vincent, DO        Or    potassium bicarb-citric acid (EFFER-K) effervescent tablet 40 mEq  40 mEq Oral PRN Jaja E Vincent, DO        Or    potassium chloride 10 mEq/100 mL IVPB (Peripheral Line)  10 mEq Intravenous PRN Jaja E Vincent, DO  enoxaparin (LOVENOX) injection 120 mg  1 mg/kg Subcutaneous BID Jaja E Vincent, DO   120 mg at 03/23/21 2023    senna (SENOKOT) tablet 8.6 mg  1 tablet Oral Daily PRN Jaja E Vincent, DO        acetaminophen (TYLENOL) tablet 650 mg  650 mg Oral Q6H PRN Jaja E Vincent, DO        Or    acetaminophen (TYLENOL) suppository 650 mg  650 mg Rectal Q6H PRN Jaja E Vincent, DO        aspirin EC tablet 325 mg  325 mg Oral Daily Jaja E Vincent, DO   325 mg at 03/23/21 1521    atorvastatin (LIPITOR) tablet 20 mg  20 mg Oral Daily Jaja E Vincent, DO   20 mg at 03/23/21 1520    [Held by provider] citalopram (CELEXA) tablet 10 mg  10 mg Oral Daily Jaja E Vincent, DO        clopidogrel (PLAVIX) tablet 75 mg  75 mg Oral Daily Jaja E Vincent, DO   75 mg at 03/23/21 1520    [Held by provider] insulin glargine (LANTUS) injection vial 43 Units  43 Units Subcutaneous Nightly Jaja E Vincent, DO        levothyroxine (SYNTHROID) tablet 150 mcg  150 mcg Oral Daily Jaja E Vincent, DO   150 mcg at 03/24/21 0616    montelukast (SINGULAIR) tablet 10 mg  10 mg Oral Nightly Jaja E Vincent, DO   10 mg at 03/23/21 2025    antioxidant multivitamin (OCUVITE) tablet  1 tablet Oral BID Jaja E Vincent, DO   1 tablet at 03/23/21 2024    oxyCODONE-acetaminophen (PERCOCET) 5-325 MG per tablet 1 tablet  1 tablet Oral Q4H PRN Jaja E Vincent, DO        pantoprazole (PROTONIX) tablet 40 mg  40 mg Oral QAM AC Jaja E Vincent, DO   40 mg at 03/24/21 0616    polyvinyl alcohol (LIQUIFILM TEARS) 1.4 % ophthalmic solution 1 drop  1 drop Left Eye PRN Jaja E Vincent, DO        insulin lispro (HUMALOG) injection vial 0-6 Units  0-6 Units Subcutaneous TID WC Jaja E Vincent, DO   1 Units at 03/24/21 0616    insulin lispro (HUMALOG) injection vial 0-3 Units  0-3 Units Subcutaneous Nightly Jaja Kaur, DO        glucose (GLUTOSE) 40 % oral gel 15 g  15 g Oral PRN Jaja Kaur, DO        dextrose 50 % IV solution 12.5 g Intravenous PRN Jaja E Vincent, DO   12.5 g at 03/21/21 1841    glucagon (rDNA) injection 1 mg  1 mg Intramuscular PRN Jaja E Vincent, DO        dextrose 5 % solution  100 mL/hr Intravenous PRN Jaja E Vincent, DO 50 mL/hr at 03/24/21 0614 50 mL/hr at 03/24/21 9312    ipratropium-albuterol (DUONEB) nebulizer solution 1 ampule  1 ampule Inhalation Q4H PRN Jaja E Vincent, DO        Arformoterol Tartrate (BROVANA) nebulizer solution 15 mcg  15 mcg Nebulization BID Jaja E Vincent, DO   15 mcg at 03/23/21 2043    And    budesonide (PULMICORT) nebulizer suspension 500 mcg  0.5 mg Nebulization BID Jaja E Vincent, DO   500 mcg at 03/23/21 2043    miconazole (MICOTIN) 2 % powder   Topical BID Jaja E Vincent, DO   Given at 03/23/21 2025    ammonium lactate (LAC-HYDRIN) 12 % lotion   Topical BID PRN Jaja E Vincent, DO           PRN Medications  sodium chloride flush, potassium chloride **OR** potassium alternative oral replacement **OR** potassium chloride, senna, acetaminophen **OR** acetaminophen, oxyCODONE-acetaminophen, polyvinyl alcohol, glucose, dextrose, glucagon (rDNA), dextrose, ipratropium-albuterol, ammonium lactate    Objective  Most Recent Recorded Vitals  BP (!) 121/39   Pulse 74   Temp 98.6 °F (37 °C) (Oral)   Resp 18   Ht 5' 7\" (1.702 m)   Wt 247 lb 11.2 oz (112.4 kg)   SpO2 95%   BMI 38.80 kg/m²   I/O last 3 completed shifts:  In: -   Out: 850 [Urine:850]  I/O this shift:  In: 2177 [I.V.:2177]  Out: 2425 [Urine:2425]    Physical Exam:  General: AAO to person/place/time/purpose, NAD, no labored breathing  Eyes: conjunctivae/corneas clear, sclera non icteric  Skin: color/texture/turgor normal, no rashes or lesions  Lungs: CTAB, no retractions/use of accessory muscles, no vocal fremitus, no rhonchi, no crackle, no rales  Heart: regular rate, regular rhythm, no murmur  Abdomen: soft, NT, bowel sounds normal  Extremities: atraumatic, trace edema R. Pretibial, no edema left leg. valve stenosis. No evidence of aortic valve regurgitation. Assessment   Active Hospital Problems    Diagnosis    NSTEMI (non-ST elevated myocardial infarction) ([facility] [I21.4]     Priority: High    Acute on chronic congestive heart failure with left ventricular diastolic dysfunction (HCC) [I50.33]     Priority: Medium    Acute respiratory failure with hypoxia (HCC) [J96.01]     Priority: Medium    CAD (coronary artery disease) [I25.10]     Priority: Medium    Bilateral carotid artery stenosis [I65.23]    History of stroke [Z86.73]    ASHD (arteriosclerotic heart disease) [I25.10]    COPD (chronic obstructive pulmonary disease) ([facility] [J44.9]    Tobacco abuse [Z72.0]    DEMARCUS (obstructive sleep apnea) [G47.33]    Hyperlipidemia [E78.5]    Diabetes mellitus ([facility] [E11.9]         Plan  · NSTEMI/CHF/hypoxia-known history of CAD: Cardiology put appreciated- sp stress test 3/23-- no plans for heart cath. Continue Imdur/statin. Therapeutic Lovenox stopped. ASA/Plavix. Echo noted. Follow I&O's. PO Diuresis. · DM, now with hypoglycemia: Holding Lantus. SSI. HbA1c 5.9%. D10W stopped  · RLE edema: Ultrasound to rule out DVT. Already on therapeutic Lovenox. · Altered mental status-likely metabolic encephalopathy: CT head negative. Marked improvement 3/23. Will obtain MRI to further eval prior to d/c. · Dysphagia: SLP eval appreciated. MBS noted-- diet altered. · PT/OT-- 12/24  · Follow labs  · DVT prophylaxis. · Please see orders for further management and care. ·  for discharge planning  · Discharge plan: Back to [facility] when stable-- no pre-cert needed    Signed Ashvin Jimenez D.O., PGY-1. 3/24/21 at 11:59 AM.      Patient was seen and evaluated independently prior to being seen with attending physician Dr. Reebcca Jerome. Patient was independently examined and discussed with Dr. Rebecca Jerome. All plans discussed with Dr. Rebecca Jerome.     Addendum: I have personally participated in a face-to-face history and physical exam on the date of service with the patient. I have discussed the case with the resident. I also participated in medical decision making with the resident on the date of service and I agree with all of the pertinent clinical information unless indicated in my editing of the note. I have reviewed and edited the note above based on my findings during my history, exam, and decision making on the same day of service. My additional thoughts:    Discharge planning-possibly back later today to the nursing home   MRI brain due to continued abnormal behaviors    Electronically signed by Matt Mosher DO on 3/24/2021 at 12:02 PM    I can be reached through [employer].

## 2021-03-25 VITALS
HEART RATE: 64 BPM | BODY MASS INDEX: 39.05 KG/M2 | OXYGEN SATURATION: 96 % | WEIGHT: 248.8 LBS | SYSTOLIC BLOOD PRESSURE: 130 MMHG | HEIGHT: 67 IN | TEMPERATURE: 98.2 F | RESPIRATION RATE: 19 BRPM | DIASTOLIC BLOOD PRESSURE: 62 MMHG

## 2021-03-25 LAB
ALBUMIN SERPL-MCNC: 3.4 G/DL (ref 3.5–5.2)
ALP BLD-CCNC: 111 U/L (ref 40–129)
ALT SERPL-CCNC: 28 U/L (ref 0–40)
ANION GAP SERPL CALCULATED.3IONS-SCNC: 8 MMOL/L (ref 7–16)
AST SERPL-CCNC: 36 U/L (ref 0–39)
BASOPHILS ABSOLUTE: 0.07 E9/L (ref 0–0.2)
BASOPHILS RELATIVE PERCENT: 1.1 % (ref 0–2)
BILIRUB SERPL-MCNC: 0.8 MG/DL (ref 0–1.2)
BUN BLDV-MCNC: 19 MG/DL (ref 8–23)
CALCIUM SERPL-MCNC: 8.3 MG/DL (ref 8.6–10.2)
CHLORIDE BLD-SCNC: 101 MMOL/L (ref 98–107)
CO2: 31 MMOL/L (ref 22–29)
CREAT SERPL-MCNC: 1.1 MG/DL (ref 0.7–1.2)
EOSINOPHILS ABSOLUTE: 0.16 E9/L (ref 0.05–0.5)
EOSINOPHILS RELATIVE PERCENT: 2.5 % (ref 0–6)
GFR AFRICAN AMERICAN: >60
GFR NON-AFRICAN AMERICAN: >60 ML/MIN/1.73
GLUCOSE BLD-MCNC: 117 MG/DL (ref 74–99)
HCT VFR BLD CALC: 37.8 % (ref 37–54)
HEMOGLOBIN: 11.3 G/DL (ref 12.5–16.5)
IMMATURE GRANULOCYTES #: 0.01 E9/L
IMMATURE GRANULOCYTES %: 0.2 % (ref 0–5)
LYMPHOCYTES ABSOLUTE: 1.75 E9/L (ref 1.5–4)
LYMPHOCYTES RELATIVE PERCENT: 27.7 % (ref 20–42)
MCH RBC QN AUTO: 24 PG (ref 26–35)
MCHC RBC AUTO-ENTMCNC: 29.9 % (ref 32–34.5)
MCV RBC AUTO: 80.3 FL (ref 80–99.9)
METER GLUCOSE: 117 MG/DL (ref 74–99)
MONOCYTES ABSOLUTE: 0.72 E9/L (ref 0.1–0.95)
MONOCYTES RELATIVE PERCENT: 11.4 % (ref 2–12)
NEUTROPHILS ABSOLUTE: 3.6 E9/L (ref 1.8–7.3)
NEUTROPHILS RELATIVE PERCENT: 57.1 % (ref 43–80)
PDW BLD-RTO: 15.3 FL (ref 11.5–15)
PLATELET # BLD: 302 E9/L (ref 130–450)
PMV BLD AUTO: 10.4 FL (ref 7–12)
POTASSIUM REFLEX MAGNESIUM: 4.2 MMOL/L (ref 3.5–5)
RBC # BLD: 4.71 E12/L (ref 3.8–5.8)
SODIUM BLD-SCNC: 140 MMOL/L (ref 132–146)
TOTAL PROTEIN: 6.3 G/DL (ref 6.4–8.3)
WBC # BLD: 6.3 E9/L (ref 4.5–11.5)

## 2021-03-25 PROCEDURE — 6370000000 HC RX 637 (ALT 250 FOR IP): Performed by: INTERNAL MEDICINE

## 2021-03-25 PROCEDURE — 2700000000 HC OXYGEN THERAPY PER DAY

## 2021-03-25 PROCEDURE — 85025 COMPLETE CBC W/AUTO DIFF WBC: CPT

## 2021-03-25 PROCEDURE — 80053 COMPREHEN METABOLIC PANEL: CPT

## 2021-03-25 PROCEDURE — 94660 CPAP INITIATION&MGMT: CPT

## 2021-03-25 PROCEDURE — 82962 GLUCOSE BLOOD TEST: CPT

## 2021-03-25 PROCEDURE — 6360000002 HC RX W HCPCS: Performed by: INTERNAL MEDICINE

## 2021-03-25 PROCEDURE — 2580000003 HC RX 258: Performed by: INTERNAL MEDICINE

## 2021-03-25 PROCEDURE — 36415 COLL VENOUS BLD VENIPUNCTURE: CPT

## 2021-03-25 RX ORDER — FUROSEMIDE 20 MG/1
20 TABLET ORAL DAILY
Status: DISCONTINUED | OUTPATIENT
Start: 2021-03-25 | End: 2021-03-25 | Stop reason: HOSPADM

## 2021-03-25 RX ADMIN — ASPIRIN 325 MG: 325 TABLET, COATED ORAL at 11:42

## 2021-03-25 RX ADMIN — Medication 1 TABLET: at 11:42

## 2021-03-25 RX ADMIN — PANTOPRAZOLE SODIUM 40 MG: 40 TABLET, DELAYED RELEASE ORAL at 06:18

## 2021-03-25 RX ADMIN — MICONAZOLE NITRATE: 2 POWDER TOPICAL at 11:43

## 2021-03-25 RX ADMIN — LEVOTHYROXINE SODIUM 150 MCG: 75 TABLET ORAL at 06:19

## 2021-03-25 RX ADMIN — Medication: at 11:43

## 2021-03-25 RX ADMIN — ENOXAPARIN SODIUM 120 MG: 120 INJECTION SUBCUTANEOUS at 11:42

## 2021-03-25 RX ADMIN — Medication 10 ML: at 11:43

## 2021-03-25 RX ADMIN — FUROSEMIDE 20 MG: 20 TABLET ORAL at 11:42

## 2021-03-25 RX ADMIN — ATORVASTATIN CALCIUM 20 MG: 40 TABLET, FILM COATED ORAL at 11:42

## 2021-03-25 RX ADMIN — CLOPIDOGREL BISULFATE 75 MG: 75 TABLET ORAL at 11:42

## 2021-03-25 ASSESSMENT — PAIN SCALES - WONG BAKER: WONGBAKER_NUMERICALRESPONSE: 0

## 2021-03-25 ASSESSMENT — PAIN SCALES - GENERAL: PAINLEVEL_OUTOF10: 0

## 2021-03-25 NOTE — PROGRESS NOTES
Patient continues to grab the aid's private areas when they come in to clean him and help assist with feeding and is verbally aggressive saying things like \"let me touch your breasts, come on! \" and being extremely perverted.

## 2021-03-25 NOTE — CARE COORDINATION
Social work / Discharge Planning:       Discharge to [place] [id number] at 1:30pm via [facility]. Social work updated RN, facility liaison and patient's sister Jaswant Marx.    Electronically signed by PIOTR Blunt on 3/25/2021 at 10:57 AM

## 2021-03-25 NOTE — PROGRESS NOTES
Speech Language Pathology      NAME:  Lori Poster  :  1950  DATE: 3/25/2021  ROOM:  [address] [place] [address]-[address]         Continued assessment of diet toleration completed yesterday. Lunch tray present. Pt very unhappy with current diet and refusing to eat. It was noted that diet had not been accurately upgraded per recommendations following video swallow eval on 3/22/21 and pt continues to receive a puree diet. After assessment on 3/24/21 SLP recommended upgrade to soft and bite sized as tolerated in attempt to increase oral intake. RN notified X2, however noted this AM diet has not been changed. Acute on chronic congestive heart failure with left ventricular diastolic dysfunction ([facility] [I50.33]        Susana LOWERY CCC/SLP [id number]  Speech-Language Pathologist

## 2021-03-26 LAB — BLOOD CULTURE, ROUTINE: NORMAL

## 2021-04-07 ENCOUNTER — TELEPHONE (OUTPATIENT)
Dept: VASCULAR SURGERY | Age: 71
End: 2021-04-07

## 2021-04-08 ENCOUNTER — OFFICE VISIT (OUTPATIENT)
Dept: VASCULAR SURGERY | Age: 71
End: 2021-04-08
Payer: MEDICARE

## 2021-04-08 ENCOUNTER — TELEPHONE (OUTPATIENT)
Dept: VASCULAR SURGERY | Age: 71
End: 2021-04-08

## 2021-04-08 VITALS — BODY MASS INDEX: 23.54 KG/M2 | HEIGHT: 67 IN | WEIGHT: 150 LBS

## 2021-04-08 DIAGNOSIS — Z87.891 HISTORY OF TOBACCO USE: ICD-10-CM

## 2021-04-08 DIAGNOSIS — I65.23 BILATERAL CAROTID ARTERY STENOSIS: Primary | ICD-10-CM

## 2021-04-08 DIAGNOSIS — Z86.73 HISTORY OF STROKE: ICD-10-CM

## 2021-04-08 PROBLEM — I50.33 ACUTE ON CHRONIC CONGESTIVE HEART FAILURE WITH LEFT VENTRICULAR DIASTOLIC DYSFUNCTION (HCC): Status: RESOLVED | Noted: 2021-03-21 | Resolved: 2021-04-08

## 2021-04-08 PROBLEM — J96.01 ACUTE RESPIRATORY FAILURE WITH HYPOXIA (HCC): Status: RESOLVED | Noted: 2020-12-07 | Resolved: 2021-04-08

## 2021-04-08 PROCEDURE — G8427 DOCREV CUR MEDS BY ELIG CLIN: HCPCS | Performed by: SURGERY

## 2021-04-08 PROCEDURE — 99213 OFFICE O/P EST LOW 20 MIN: CPT | Performed by: SURGERY

## 2021-04-08 PROCEDURE — 1036F TOBACCO NON-USER: CPT | Performed by: SURGERY

## 2021-04-08 PROCEDURE — 1123F ACP DISCUSS/DSCN MKR DOCD: CPT | Performed by: SURGERY

## 2021-04-08 PROCEDURE — G8420 CALC BMI NORM PARAMETERS: HCPCS | Performed by: SURGERY

## 2021-04-08 PROCEDURE — 3017F COLORECTAL CA SCREEN DOC REV: CPT | Performed by: SURGERY

## 2021-04-08 PROCEDURE — 4040F PNEUMOC VAC/ADMIN/RCVD: CPT | Performed by: SURGERY

## 2021-04-08 PROCEDURE — 1111F DSCHRG MED/CURRENT MED MERGE: CPT | Performed by: SURGERY

## 2021-04-08 NOTE — TELEPHONE ENCOUNTER
Notified Adalgisa at [facility] of carotid ultrasound at [facility] on Friday, 4-16-21 at 11:30 am.  [place] at 11:00 am.

## 2021-04-08 NOTE — PROGRESS NOTES
Chief Complaint:   Chief Complaint   Patient presents with    Circulatory Problem     bilateral carotid artery stenosis         HPI: Patient came to the office for the evaluation of history of carotid artery disease, was last seen by me in October, discussed with the patient Sister Kathleen Aguirre at that time, had a possible Bell's palsy, MRI of the brain revealed evidence of a occipital lobe infarct, it is felt, the carotid ultrasound that revealed bilateral carotid stenosis is asymptomatic and patient was recommended consider therapy only      Patient denies any focal lateralizing neurological symptoms like loss of speech, vision or loss of function of extremity    Patient can walk short distances slowly, and denies any symptoms of rest pain    No Known Allergies    Current Outpatient Medications   Medication Sig Dispense Refill    senna (SENOKOT) 8.6 MG tablet Take 1 tablet by mouth daily as needed (Constipation)      furosemide (LASIX) 20 MG tablet Take 1 tablet by mouth daily 30 tablet 0    divalproex (DEPAKOTE SPRINKLE) 125 MG capsule Take 125 mg by mouth 2 times daily      pantoprazole (PROTONIX) 40 MG tablet Take 1 tablet by mouth every morning (before breakfast) 30 tablet 0    budesonide-formoterol (SYMBICORT) 160-4.5 MCG/ACT AERO Inhale 2 puffs into the lungs 2 times daily 1 Inhaler 3    albuterol-ipratropium (COMBIVENT RESPIMAT)  MCG/ACT AERS inhaler Inhale 1 puff into the lungs every 4 hours as needed for Wheezing or Shortness of Breath      insulin aspart (NOVOLOG FLEXPEN) 100 UNIT/ML injection pen Inject 0-12 Units into the skin 3 times daily (before meals) 0-140= 0 units  141-180= 2 units  181-220= 4 units  221-260= 6 units  261-300= 8 units  301-340= 10 units  341+ = 12 units and notify md      polyvinyl alcohol (LIQUIFILM TEARS) 1.4 % ophthalmic solution Place 1 drop into the left eye as needed      citalopram (CELEXA) 10 MG tablet Take 10 mg by mouth daily      metFORMIN (GLUCOPHAGE) 1000 MG tablet Take 1,000 mg by mouth 2 times daily (with meals)      levothyroxine (SYNTHROID) 175 MCG tablet Take 150 mcg by mouth Daily       aspirin (ECOTRIN) 325 MG EC tablet Take 325 mg by mouth daily. Do not crush      oxyCODONE-acetaminophen (PERCOCET) 5-325 MG per tablet   Take 1 tablet by mouth every 4 hours as needed for Pain Instructed to take am of procedure if needed      acetaminophen 650 MG TABS Take 650 mg by mouth every 4 hours as needed. 120 tablet 3    isosorbide mononitrate (IMDUR) 60 MG CR tablet Take 1 tablet by mouth daily. (Patient taking differently: Take 30 mg by mouth daily ) 30 tablet 3    glucagon, rDNA, 1 MG SOLR injection Inject 1 mg into the muscle as needed for Low blood sugar (Blood glucose less than 70 mg/dL and patient NOT ALERT or NPO and does not have IV access. ).  0    glucose (GLUTOSE) 40 % GEL Take 15 g by mouth as needed. 45 g 1    magnesium hydroxide (MILK OF MAGNESIA) 400 MG/5ML suspension Take 30 mLs by mouth daily as needed for Constipation.  atorvastatin (LIPITOR) 20 MG tablet Take 20 mg by mouth daily.  multivitamin (OCUVITE) TABS per tablet Take 1 tablet by mouth 2 times daily       montelukast (SINGULAIR) 10 MG tablet Take 1 tablet by mouth nightly. 30 tablet 0    clopidogrel (PLAVIX) 75 MG tablet Take 1 tablet by mouth daily. 30 tablet 11     No current facility-administered medications for this visit.         Past Medical History:   Diagnosis Date    Arthritis     Asthma     Bilateral carotid artery stenosis 10/1/2020    CAD (coronary artery disease) 2001    FREDA to RCA    COPD (chronic obstructive pulmonary disease) [facility]     Diabetes mellitus ([facility]     Marcus's gangrene     History of tobacco use 4/8/2021    Hyperlipidemia     Hypothyroidism     DEMARCUS (obstructive sleep apnea)     no cpap or bipap     Pneumonia 2/5/2015    Stroke [facility])        Past Surgical History:   Procedure Laterality Date    CARDIAC SURGERY      CORONARY neurological symptoms like loss of speech, vision or loss of function of extremity. All the questions were answered. Orders Placed This Encounter   Procedures    US CAROTID ARTERY BILATERAL     No orders of the defined types were placed in this encounter. Indicated follow-up: Return in about 6 months (around 10/8/2021), or if symptoms worsen or fail to improve.

## 2021-04-16 ENCOUNTER — HOSPITAL ENCOUNTER (OUTPATIENT)
Dept: ULTRASOUND IMAGING | Age: 71
Discharge: HOME | End: 2021-04-18
Payer: COMMERCIAL

## 2021-04-16 DIAGNOSIS — I65.23 BILATERAL CAROTID ARTERY STENOSIS: ICD-10-CM

## 2021-04-16 PROCEDURE — 93880 EXTRACRANIAL BILAT STUDY: CPT

## 2021-04-20 ENCOUNTER — CARE COORDINATION (OUTPATIENT)
Dept: CASE MANAGEMENT | Age: 71
End: 2021-04-20

## 2021-04-20 NOTE — CARE COORDINATION
[facility] Transitions BPCI-A Follow Up Call  Patient transferred from SNF to LTC at [facility] on Date: 4/17/21    Attempted to contact [facility]. Spoke with Geeta King who transferred call to the nurse. No answer. Will try again at a later time.       Gretta Friend, RN  Care Transition Nurse

## 2021-04-23 ENCOUNTER — TELEPHONE (OUTPATIENT)
Dept: VASCULAR SURGERY | Age: 71
End: 2021-04-23

## 2021-04-23 NOTE — TELEPHONE ENCOUNTER
Message left for Denisa Chadwick, patient's sister telephone #[phone], personally reviewed the carotid ultrasound, appears to be slight worsening, based upon the atherosclerotic plaque in velocities combination of my personal interpretation is that the stenosis no greater than 70% and as the patient otherwise at the present time asymptomatic and has multiple medical issues follow him conservatively with instruction to continue aspirin call immediately if any focal lateralizing neurologic symptoms and a follow-up appointment, already scheduled to see me in 6 months, and to call the office if any additional questions or concerns

## 2021-04-27 ENCOUNTER — CARE COORDINATION (OUTPATIENT)
Dept: CASE MANAGEMENT | Age: 71
End: 2021-04-27

## 2021-04-27 NOTE — CARE COORDINATION
[facility] Transitions Follow Up Call    2021    Patient: Niko Miguel  Patient : 1950   MRN: <[id number]>  Reason for Admission:   Discharge Date: 3/25/21 RARS: Readmission Risk Score: 25         Spoke with: Nurse at [address] Transitions Subsequent and Final Call    Subsequent and Final Calls  Care Transitions Interventions  Other Interventions:       Pt is in LTC at [place]. Jessica, pt's nurse, states pt seems to be doing well. Reports he's up walking around today and is currently in resident [place] meeting. Reports he has a small amount of dependent edema in his lower extremities from being on his feet, but no SOB or other concerns. Will continue to follow for BPCI.      Follow Up  Future Appointments   Date Time Provider [place]   10/21/2021  1:45 PM Fitz Hamilton MD [facility]       Holly Manzo

## 2021-05-27 ENCOUNTER — CARE COORDINATION (OUTPATIENT)
Dept: CASE MANAGEMENT | Age: 71
End: 2021-05-27

## 2021-05-27 NOTE — CARE COORDINATION
[address] [facility] Transitions BPCI-A Follow Up Call  Patient transferred from SNF to LTC at [facility] on Date: 4/17/21    Attempted to contact [facility]. Call transferred to nurse. No answer. CTN on hold for several minutes. Will try again at a later time.       Gretta Friend RN  Care Transition Nurse

## 2021-05-28 ENCOUNTER — TELEPHONE (OUTPATIENT)
Dept: VASCULAR SURGERY | Age: 71
End: 2021-05-28

## 2021-05-28 NOTE — TELEPHONE ENCOUNTER
Spoke with Mona Goff at [facility], cancelled 10-21-21 appt with Dr. Jamee Reese and rescheduled to 12-2-21 at 10:45 am.

## 2021-06-04 ENCOUNTER — CARE COORDINATION (OUTPATIENT)
Dept: CASE MANAGEMENT | Age: 71
End: 2021-06-04

## 2021-06-04 NOTE — CARE COORDINATION
[facility] Transitions Follow Up Call    2021    Patient: Danita Jeff  Patient : 1950   MRN: <[id number]>  Reason for Admission:   Discharge Date: 3/25/21 RARS: Readmission Risk Score: 25    Called LTC at [facility]. Spoke to Jus Roper, patient's nurse. Patient is there Long term. No chest pain or chest pressure, Denies jaw pain, radiating pain down arms or in chest area, Denies chest tightness. Denies chest pain with or without exertion. Denies SOB or dizziness. Patient has all his medications and is taking as directed. Patient is incontinent at times. Appetite good. No falls or balance issues. Uses cane to ambulate. No further PT or OT at this time. Patient does get confused at times. Will continue to follow. Eliane Bunch LPN    [phone]  [facility] / [address] Transitions Subsequent and Final Call    Subsequent and Final Calls  Do you have any ongoing symptoms?: No  Have your medications changed?: No  Do you have any questions related to your medications?: No  Do you currently have any active services?: No  Do you have any needs or concerns that I can assist you with?: No  Identified Barriers: None  Care Transitions Interventions  Other Interventions:            Follow Up  Future Appointments   Date Time Provider [place]   2021 10:45 AM [facility]   2021 11:00 AM Jose Blount MD [facility]       Eliane Bunch LPN

## 2021-06-16 ENCOUNTER — CARE COORDINATION (OUTPATIENT)
Dept: CASE MANAGEMENT | Age: 71
End: 2021-06-16

## 2021-06-30 ENCOUNTER — HOSPITAL ENCOUNTER (INPATIENT)
Dept: HOSPITAL 83 - 3N | Age: 71
LOS: 12 days | Discharge: NURSING, REHAB OR LONG-TERM CARE | DRG: 886 | End: 2021-07-12
Attending: PSYCHIATRY & NEUROLOGY | Admitting: PSYCHIATRY & NEUROLOGY
Payer: MEDICARE

## 2021-06-30 VITALS — BODY MASS INDEX: 37.64 KG/M2 | HEIGHT: 67.99 IN | WEIGHT: 248.38 LBS

## 2021-06-30 VITALS — DIASTOLIC BLOOD PRESSURE: 82 MMHG

## 2021-06-30 VITALS — DIASTOLIC BLOOD PRESSURE: 42 MMHG | SYSTOLIC BLOOD PRESSURE: 112 MMHG

## 2021-06-30 DIAGNOSIS — K21.9: ICD-10-CM

## 2021-06-30 DIAGNOSIS — Z79.899: ICD-10-CM

## 2021-06-30 DIAGNOSIS — Z82.49: ICD-10-CM

## 2021-06-30 DIAGNOSIS — F63.9: Primary | ICD-10-CM

## 2021-06-30 DIAGNOSIS — J45.20: ICD-10-CM

## 2021-06-30 DIAGNOSIS — J44.9: ICD-10-CM

## 2021-06-30 DIAGNOSIS — E44.0: ICD-10-CM

## 2021-06-30 DIAGNOSIS — F17.210: ICD-10-CM

## 2021-06-30 DIAGNOSIS — G31.84: ICD-10-CM

## 2021-06-30 DIAGNOSIS — E11.65: ICD-10-CM

## 2021-06-30 DIAGNOSIS — D50.9: ICD-10-CM

## 2021-06-30 DIAGNOSIS — I25.10: ICD-10-CM

## 2021-06-30 DIAGNOSIS — E78.5: ICD-10-CM

## 2021-06-30 DIAGNOSIS — F41.9: ICD-10-CM

## 2021-06-30 DIAGNOSIS — I50.32: ICD-10-CM

## 2021-06-30 DIAGNOSIS — I11.0: ICD-10-CM

## 2021-06-30 DIAGNOSIS — F32.9: ICD-10-CM

## 2021-06-30 DIAGNOSIS — Z95.5: ICD-10-CM

## 2021-06-30 DIAGNOSIS — H35.30: ICD-10-CM

## 2021-06-30 DIAGNOSIS — Z71.6: ICD-10-CM

## 2021-06-30 DIAGNOSIS — Z86.73: ICD-10-CM

## 2021-06-30 DIAGNOSIS — Z79.82: ICD-10-CM

## 2021-07-01 VITALS — DIASTOLIC BLOOD PRESSURE: 62 MMHG | SYSTOLIC BLOOD PRESSURE: 161 MMHG

## 2021-07-01 VITALS — SYSTOLIC BLOOD PRESSURE: 107 MMHG | DIASTOLIC BLOOD PRESSURE: 30 MMHG

## 2021-07-01 LAB
25(OH)D3 SERPL-MCNC: 39.1 NG/ML (ref 30–100)
ALBUMIN SERPL-MCNC: 2.8 GM/DL (ref 3.1–4.5)
ALP SERPL-CCNC: 57 U/L (ref 45–117)
ALT SERPL W P-5'-P-CCNC: 22 U/L (ref 12–78)
AST SERPL-CCNC: 8 IU/L (ref 3–35)
BASOPHILS # BLD AUTO: 0.1 10*3/UL (ref 0–0.1)
BASOPHILS NFR BLD AUTO: 0.8 % (ref 0–1)
BUN SERPL-MCNC: 26 MG/DL (ref 7–24)
CHLORIDE SERPL-SCNC: 111 MMOL/L (ref 98–107)
CHOLEST SERPL-MCNC: 122 MG/DL (ref ?–200)
CREAT SERPL-MCNC: 1.1 MG/DL (ref 0.7–1.3)
EOSINOPHIL # BLD AUTO: 0.2 10*3/UL (ref 0–0.4)
EOSINOPHIL # BLD AUTO: 3 % (ref 1–4)
ERYTHROCYTE [DISTWIDTH] IN BLOOD BY AUTOMATED COUNT: 22.6 % (ref 0–14.5)
HCT VFR BLD AUTO: 39.5 % (ref 42–52)
LDLC SERPL DIRECT ASSAY-MCNC: 67 MG/DL (ref 9–159)
LYMPHOCYTES # BLD AUTO: 2.5 10*3/UL (ref 1.3–4.4)
LYMPHOCYTES NFR BLD AUTO: 33.2 % (ref 27–41)
MCH RBC QN AUTO: 25.1 PG (ref 27–31)
MCHC RBC AUTO-ENTMCNC: 31.1 G/DL (ref 33–37)
MCV RBC AUTO: 80.4 FL (ref 80–94)
MONOCYTES # BLD AUTO: 0.7 10*3/UL (ref 0.1–1)
MONOCYTES NFR BLD MANUAL: 9.9 % (ref 3–9)
NEUT #: 3.9 10*3/UL (ref 2.3–7.9)
NEUT %: 52.7 % (ref 47–73)
NRBC BLD QL AUTO: 0 10*3/UL (ref 0–0)
PLATELET # BLD AUTO: 197 10*3/UL (ref 130–400)
PMV BLD AUTO: 9.9 FL (ref 9.6–12.3)
POTASSIUM SERPL-SCNC: 4.6 MMOL/L (ref 3.5–5.1)
PROT SERPL-MCNC: 6 GM/DL (ref 6.4–8.2)
RBC # BLD AUTO: 4.91 10*6/UL (ref 4.5–5.9)
SODIUM SERPL-SCNC: 142 MMOL/L (ref 136–145)
TRIGL SERPL-MCNC: 61 MG/DL (ref ?–150)
TSH SERPL DL<=0.005 MIU/L-ACNC: 1.08 UIU/ML (ref 0.36–4.75)
VITAMIN B12: 559 PG/ML (ref 247–911)
WBC NRBC COR # BLD AUTO: 7.4 10*3/UL (ref 4.8–10.8)

## 2021-07-02 VITALS — DIASTOLIC BLOOD PRESSURE: 76 MMHG

## 2021-07-02 VITALS — DIASTOLIC BLOOD PRESSURE: 74 MMHG

## 2021-07-03 VITALS — DIASTOLIC BLOOD PRESSURE: 52 MMHG

## 2021-07-03 VITALS — DIASTOLIC BLOOD PRESSURE: 79 MMHG | SYSTOLIC BLOOD PRESSURE: 114 MMHG

## 2021-07-03 LAB
BACTERIA #/AREA URNS HPF: (no result) /[HPF]
EPI CELLS #/AREA URNS HPF: (no result) /[HPF]
LEUKOCYTE ESTERASE UR QL STRIP: (no result)
MUCOUS THREADS URNS QL MICRO: (no result)
PH UR STRIP: 5 [PH] (ref 4.5–8)
RBC #/AREA URNS HPF: (no result) RBC/HPF (ref 0–2)
SP GR UR: 1.01 (ref 1–1.03)
UROBILINOGEN UR STRIP-MCNC: 1 E.U./DL (ref 0–1)
WBC #/AREA URNS HPF: (no result) WBC/HPF (ref 0–5)

## 2021-07-04 VITALS — DIASTOLIC BLOOD PRESSURE: 49 MMHG

## 2021-07-04 VITALS — DIASTOLIC BLOOD PRESSURE: 50 MMHG

## 2021-07-05 VITALS — DIASTOLIC BLOOD PRESSURE: 81 MMHG

## 2021-07-05 VITALS — DIASTOLIC BLOOD PRESSURE: 59 MMHG

## 2021-07-06 VITALS — SYSTOLIC BLOOD PRESSURE: 120 MMHG | DIASTOLIC BLOOD PRESSURE: 96 MMHG

## 2021-07-06 VITALS — DIASTOLIC BLOOD PRESSURE: 64 MMHG | SYSTOLIC BLOOD PRESSURE: 128 MMHG

## 2021-07-07 VITALS — DIASTOLIC BLOOD PRESSURE: 50 MMHG

## 2021-07-07 VITALS — SYSTOLIC BLOOD PRESSURE: 118 MMHG | DIASTOLIC BLOOD PRESSURE: 62 MMHG

## 2021-07-08 VITALS — DIASTOLIC BLOOD PRESSURE: 52 MMHG

## 2021-07-08 VITALS — DIASTOLIC BLOOD PRESSURE: 84 MMHG

## 2021-07-09 VITALS — DIASTOLIC BLOOD PRESSURE: 68 MMHG | SYSTOLIC BLOOD PRESSURE: 142 MMHG

## 2021-07-09 VITALS — DIASTOLIC BLOOD PRESSURE: 52 MMHG

## 2021-07-10 VITALS — DIASTOLIC BLOOD PRESSURE: 58 MMHG

## 2021-07-10 VITALS — DIASTOLIC BLOOD PRESSURE: 65 MMHG | SYSTOLIC BLOOD PRESSURE: 131 MMHG

## 2021-07-11 VITALS — DIASTOLIC BLOOD PRESSURE: 68 MMHG | SYSTOLIC BLOOD PRESSURE: 138 MMHG

## 2021-07-11 VITALS — DIASTOLIC BLOOD PRESSURE: 52 MMHG

## 2021-07-12 VITALS — DIASTOLIC BLOOD PRESSURE: 52 MMHG | SYSTOLIC BLOOD PRESSURE: 153 MMHG

## 2021-08-10 ENCOUNTER — APPOINTMENT (OUTPATIENT)
Dept: CT IMAGING | Age: 71
DRG: 683 | End: 2021-08-10
Payer: MEDICARE

## 2021-08-10 ENCOUNTER — HOSPITAL ENCOUNTER (INPATIENT)
Age: 71
LOS: 2 days | Discharge: OTHER INSTITUTION | DRG: 683 | End: 2021-08-12
Attending: EMERGENCY MEDICINE | Admitting: INTERNAL MEDICINE
Payer: MEDICARE

## 2021-08-10 DIAGNOSIS — N17.9 AKI (ACUTE KIDNEY INJURY) (HCC): Primary | ICD-10-CM

## 2021-08-10 LAB
ALBUMIN SERPL-MCNC: 3 G/DL (ref 3.5–5.2)
ALP BLD-CCNC: 76 U/L (ref 40–129)
ALT SERPL-CCNC: 35 U/L (ref 0–40)
ANION GAP SERPL CALCULATED.3IONS-SCNC: 13 MMOL/L (ref 7–16)
AST SERPL-CCNC: 28 U/L (ref 0–39)
BACTERIA: ABNORMAL /HPF
BASOPHILS ABSOLUTE: 0.03 E9/L (ref 0–0.2)
BASOPHILS RELATIVE PERCENT: 0.4 % (ref 0–2)
BILIRUB SERPL-MCNC: 0.6 MG/DL (ref 0–1.2)
BILIRUBIN URINE: NEGATIVE
BLOOD, URINE: ABNORMAL
BUN BLDV-MCNC: 39 MG/DL (ref 6–23)
CALCIUM SERPL-MCNC: 8.9 MG/DL (ref 8.6–10.2)
CHLORIDE BLD-SCNC: 104 MMOL/L (ref 98–107)
CLARITY: CLEAR
CO2: 24 MMOL/L (ref 22–29)
COLOR: YELLOW
CREAT SERPL-MCNC: 1.8 MG/DL (ref 0.7–1.2)
EOSINOPHILS ABSOLUTE: 0.06 E9/L (ref 0.05–0.5)
EOSINOPHILS RELATIVE PERCENT: 0.7 % (ref 0–6)
GFR AFRICAN AMERICAN: 45
GFR NON-AFRICAN AMERICAN: 37 ML/MIN/1.73
GLUCOSE BLD-MCNC: 158 MG/DL (ref 74–99)
GLUCOSE URINE: NEGATIVE MG/DL
HCT VFR BLD CALC: 38.5 % (ref 37–54)
HEMOGLOBIN: 12 G/DL (ref 12.5–16.5)
IMMATURE GRANULOCYTES #: 0.03 E9/L
IMMATURE GRANULOCYTES %: 0.4 % (ref 0–5)
KETONES, URINE: ABNORMAL MG/DL
LACTIC ACID: 3.1 MMOL/L (ref 0.5–2.2)
LEUKOCYTE ESTERASE, URINE: ABNORMAL
LYMPHOCYTES ABSOLUTE: 1.08 E9/L (ref 1.5–4)
LYMPHOCYTES RELATIVE PERCENT: 13.1 % (ref 20–42)
MCH RBC QN AUTO: 26.6 PG (ref 26–35)
MCHC RBC AUTO-ENTMCNC: 31.2 % (ref 32–34.5)
MCV RBC AUTO: 85.4 FL (ref 80–99.9)
METER GLUCOSE: 109 MG/DL (ref 74–99)
MONOCYTES ABSOLUTE: 0.95 E9/L (ref 0.1–0.95)
MONOCYTES RELATIVE PERCENT: 11.5 % (ref 2–12)
NEUTROPHILS ABSOLUTE: 6.1 E9/L (ref 1.8–7.3)
NEUTROPHILS RELATIVE PERCENT: 73.9 % (ref 43–80)
NITRITE, URINE: NEGATIVE
PDW BLD-RTO: 18.2 FL (ref 11.5–15)
PH UA: 5 (ref 5–9)
PLATELET # BLD: 166 E9/L (ref 130–450)
PMV BLD AUTO: 10.8 FL (ref 7–12)
POTASSIUM REFLEX MAGNESIUM: 4.6 MMOL/L (ref 3.5–5)
PROTEIN UA: 100 MG/DL
RBC # BLD: 4.51 E12/L (ref 3.8–5.8)
RBC UA: ABNORMAL /HPF (ref 0–2)
SARS-COV-2, NAAT: NOT DETECTED
SODIUM BLD-SCNC: 141 MMOL/L (ref 132–146)
SPECIFIC GRAVITY UA: 1.02 (ref 1–1.03)
TOTAL PROTEIN: 6.3 G/DL (ref 6.4–8.3)
TSH SERPL DL<=0.05 MIU/L-ACNC: 0.8 UIU/ML (ref 0.27–4.2)
UROBILINOGEN, URINE: 1 E.U./DL
VALPROIC ACID LEVEL: 65 MCG/ML (ref 50–100)
WBC # BLD: 8.3 E9/L (ref 4.5–11.5)
WBC UA: ABNORMAL /HPF (ref 0–5)

## 2021-08-10 PROCEDURE — 81001 URINALYSIS AUTO W/SCOPE: CPT

## 2021-08-10 PROCEDURE — 2060000000 HC ICU INTERMEDIATE R&B

## 2021-08-10 PROCEDURE — 94660 CPAP INITIATION&MGMT: CPT

## 2021-08-10 PROCEDURE — 84443 ASSAY THYROID STIM HORMONE: CPT

## 2021-08-10 PROCEDURE — 6370000000 HC RX 637 (ALT 250 FOR IP): Performed by: INTERNAL MEDICINE

## 2021-08-10 PROCEDURE — 82962 GLUCOSE BLOOD TEST: CPT

## 2021-08-10 PROCEDURE — 80053 COMPREHEN METABOLIC PANEL: CPT

## 2021-08-10 PROCEDURE — 85025 COMPLETE CBC W/AUTO DIFF WBC: CPT

## 2021-08-10 PROCEDURE — 70450 CT HEAD/BRAIN W/O DYE: CPT

## 2021-08-10 PROCEDURE — 87635 SARS-COV-2 COVID-19 AMP PRB: CPT

## 2021-08-10 PROCEDURE — 6360000002 HC RX W HCPCS: Performed by: INTERNAL MEDICINE

## 2021-08-10 PROCEDURE — 83605 ASSAY OF LACTIC ACID: CPT

## 2021-08-10 PROCEDURE — 87088 URINE BACTERIA CULTURE: CPT

## 2021-08-10 PROCEDURE — 72125 CT NECK SPINE W/O DYE: CPT

## 2021-08-10 PROCEDURE — 99284 EMERGENCY DEPT VISIT MOD MDM: CPT

## 2021-08-10 PROCEDURE — 94640 AIRWAY INHALATION TREATMENT: CPT

## 2021-08-10 PROCEDURE — 2580000003 HC RX 258: Performed by: EMERGENCY MEDICINE

## 2021-08-10 PROCEDURE — 2580000003 HC RX 258: Performed by: INTERNAL MEDICINE

## 2021-08-10 PROCEDURE — 80164 ASSAY DIPROPYLACETIC ACD TOT: CPT

## 2021-08-10 RX ORDER — POLYVINYL ALCOHOL 14 MG/ML
1 SOLUTION/ DROPS OPHTHALMIC EVERY 8 HOURS PRN
Status: DISCONTINUED | OUTPATIENT
Start: 2021-08-10 | End: 2021-08-12 | Stop reason: HOSPADM

## 2021-08-10 RX ORDER — BUDESONIDE 0.5 MG/2ML
0.5 INHALANT ORAL 2 TIMES DAILY
Status: DISCONTINUED | OUTPATIENT
Start: 2021-08-10 | End: 2021-08-12 | Stop reason: HOSPADM

## 2021-08-10 RX ORDER — ASPIRIN 325 MG
325 TABLET ORAL DAILY
Status: DISCONTINUED | OUTPATIENT
Start: 2021-08-10 | End: 2021-08-12 | Stop reason: HOSPADM

## 2021-08-10 RX ORDER — AMMONIUM LACTATE 12 G/100G
CREAM TOPICAL EVERY 12 HOURS PRN
COMMUNITY

## 2021-08-10 RX ORDER — AMMONIUM LACTATE 12 G/100G
LOTION TOPICAL EVERY 12 HOURS PRN
Status: DISCONTINUED | OUTPATIENT
Start: 2021-08-10 | End: 2021-08-12 | Stop reason: HOSPADM

## 2021-08-10 RX ORDER — AMOXICILLIN AND CLAVULANATE POTASSIUM 875; 125 MG/1; MG/1
1 TABLET, FILM COATED ORAL 2 TIMES DAILY
Status: ON HOLD | COMMUNITY
Start: 2021-08-10 | End: 2021-08-10

## 2021-08-10 RX ORDER — SENNA PLUS 8.6 MG/1
1 TABLET ORAL DAILY
COMMUNITY

## 2021-08-10 RX ORDER — ASPIRIN 325 MG
325 TABLET ORAL DAILY
COMMUNITY

## 2021-08-10 RX ORDER — TRIHEXYPHENIDYL HYDROCHLORIDE 2 MG/1
2 TABLET ORAL 2 TIMES DAILY
Status: DISCONTINUED | OUTPATIENT
Start: 2021-08-10 | End: 2021-08-12 | Stop reason: HOSPADM

## 2021-08-10 RX ORDER — BUDESONIDE AND FORMOTEROL FUMARATE DIHYDRATE 160; 4.5 UG/1; UG/1
2 AEROSOL RESPIRATORY (INHALATION) 2 TIMES DAILY
Status: DISCONTINUED | OUTPATIENT
Start: 2021-08-10 | End: 2021-08-10 | Stop reason: CLARIF

## 2021-08-10 RX ORDER — ERYTHROMYCIN 5 MG/G
OINTMENT OPHTHALMIC 2 TIMES DAILY
COMMUNITY

## 2021-08-10 RX ORDER — IPRATROPIUM BROMIDE AND ALBUTEROL SULFATE 2.5; .5 MG/3ML; MG/3ML
1 SOLUTION RESPIRATORY (INHALATION) EVERY 4 HOURS PRN
Status: DISCONTINUED | OUTPATIENT
Start: 2021-08-10 | End: 2021-08-12 | Stop reason: HOSPADM

## 2021-08-10 RX ORDER — M-VIT,TX,IRON,MINS/CALC/FOLIC 27MG-0.4MG
1 TABLET ORAL DAILY
COMMUNITY

## 2021-08-10 RX ORDER — DIVALPROEX SODIUM 500 MG/1
500 TABLET, DELAYED RELEASE ORAL 3 TIMES DAILY
Status: DISCONTINUED | OUTPATIENT
Start: 2021-08-10 | End: 2021-08-12 | Stop reason: HOSPADM

## 2021-08-10 RX ORDER — LEVOTHYROXINE SODIUM 175 UG/1
175 TABLET ORAL DAILY
Status: DISCONTINUED | OUTPATIENT
Start: 2021-08-10 | End: 2021-08-12 | Stop reason: HOSPADM

## 2021-08-10 RX ORDER — AMOXICILLIN AND CLAVULANATE POTASSIUM 875; 125 MG/1; MG/1
1 TABLET, FILM COATED ORAL 2 TIMES DAILY
COMMUNITY
Start: 2021-08-10

## 2021-08-10 RX ORDER — ALBUTEROL SULFATE 0.63 MG/3ML
2.5 SOLUTION RESPIRATORY (INHALATION) EVERY 6 HOURS PRN
Status: DISCONTINUED | OUTPATIENT
Start: 2021-08-10 | End: 2021-08-12 | Stop reason: HOSPADM

## 2021-08-10 RX ORDER — ARFORMOTEROL TARTRATE 15 UG/2ML
15 SOLUTION RESPIRATORY (INHALATION) 2 TIMES DAILY
Status: DISCONTINUED | OUTPATIENT
Start: 2021-08-10 | End: 2021-08-12 | Stop reason: HOSPADM

## 2021-08-10 RX ORDER — ISOSORBIDE MONONITRATE 60 MG/1
60 TABLET, EXTENDED RELEASE ORAL DAILY
COMMUNITY

## 2021-08-10 RX ORDER — ERYTHROMYCIN 5 MG/G
1 OINTMENT OPHTHALMIC 2 TIMES DAILY
Status: ON HOLD | COMMUNITY
End: 2021-08-10

## 2021-08-10 RX ORDER — AZITHROMYCIN 250 MG/1
250 TABLET, FILM COATED ORAL DAILY
Status: ON HOLD | COMMUNITY
Start: 2021-08-11 | End: 2021-08-12 | Stop reason: HOSPADM

## 2021-08-10 RX ORDER — DIVALPROEX SODIUM 500 MG/1
500 TABLET, DELAYED RELEASE ORAL 3 TIMES DAILY
Status: ON HOLD | COMMUNITY
End: 2021-08-12 | Stop reason: HOSPADM

## 2021-08-10 RX ORDER — DEXTROMETHORPHAN HYDROBROMIDE AND QUINIDINE SULFATE 20; 10 MG/1; MG/1
1 CAPSULE, GELATIN COATED ORAL 2 TIMES DAILY
COMMUNITY

## 2021-08-10 RX ORDER — MEDROXYPROGESTERONE ACETATE 10 MG/1
30 TABLET ORAL 2 TIMES DAILY
Status: ON HOLD | COMMUNITY
End: 2021-08-10

## 2021-08-10 RX ORDER — DEXTROSE MONOHYDRATE 50 MG/ML
100 INJECTION, SOLUTION INTRAVENOUS PRN
Status: DISCONTINUED | OUTPATIENT
Start: 2021-08-10 | End: 2021-08-12 | Stop reason: HOSPADM

## 2021-08-10 RX ORDER — PANTOPRAZOLE SODIUM 40 MG/1
40 TABLET, DELAYED RELEASE ORAL DAILY
Status: DISCONTINUED | OUTPATIENT
Start: 2021-08-10 | End: 2021-08-12 | Stop reason: HOSPADM

## 2021-08-10 RX ORDER — RIVASTIGMINE TARTRATE 3 MG/1
3 CAPSULE ORAL 2 TIMES DAILY
Status: DISCONTINUED | OUTPATIENT
Start: 2021-08-10 | End: 2021-08-12 | Stop reason: HOSPADM

## 2021-08-10 RX ORDER — SENNA PLUS 8.6 MG/1
1 TABLET ORAL DAILY
Status: DISCONTINUED | OUTPATIENT
Start: 2021-08-10 | End: 2021-08-12 | Stop reason: HOSPADM

## 2021-08-10 RX ORDER — PANTOPRAZOLE SODIUM 40 MG/1
40 TABLET, DELAYED RELEASE ORAL DAILY
COMMUNITY

## 2021-08-10 RX ORDER — SODIUM CHLORIDE 9 MG/ML
INJECTION, SOLUTION INTRAVENOUS CONTINUOUS
Status: DISCONTINUED | OUTPATIENT
Start: 2021-08-10 | End: 2021-08-12

## 2021-08-10 RX ORDER — CLOPIDOGREL BISULFATE 75 MG/1
75 TABLET ORAL NIGHTLY
Status: DISCONTINUED | OUTPATIENT
Start: 2021-08-10 | End: 2021-08-12 | Stop reason: HOSPADM

## 2021-08-10 RX ORDER — CLOPIDOGREL BISULFATE 75 MG/1
75 TABLET ORAL NIGHTLY
COMMUNITY

## 2021-08-10 RX ORDER — RIVASTIGMINE TARTRATE 3 MG/1
3 CAPSULE ORAL 2 TIMES DAILY
COMMUNITY

## 2021-08-10 RX ORDER — MONTELUKAST SODIUM 10 MG/1
10 TABLET ORAL NIGHTLY
Status: DISCONTINUED | OUTPATIENT
Start: 2021-08-10 | End: 2021-08-12 | Stop reason: HOSPADM

## 2021-08-10 RX ORDER — 0.9 % SODIUM CHLORIDE 0.9 %
1000 INTRAVENOUS SOLUTION INTRAVENOUS ONCE
Status: COMPLETED | OUTPATIENT
Start: 2021-08-10 | End: 2021-08-10

## 2021-08-10 RX ORDER — ANTIOX #8/OM3/DHA/EPA/LUT/ZEAX 250-2.5 MG
1 CAPSULE ORAL 2 TIMES DAILY
COMMUNITY

## 2021-08-10 RX ORDER — HYDROCODONE BITARTRATE AND ACETAMINOPHEN 5; 325 MG/1; MG/1
1 TABLET ORAL EVERY 6 HOURS PRN
COMMUNITY
Start: 2021-08-08 | End: 2021-08-19

## 2021-08-10 RX ORDER — ALBUTEROL SULFATE 2.5 MG/.5ML
2.5 SOLUTION RESPIRATORY (INHALATION) EVERY 6 HOURS PRN
COMMUNITY
Start: 2021-08-09 | End: 2021-08-13

## 2021-08-10 RX ORDER — POLYVINYL ALCOHOL 14 MG/ML
1 SOLUTION/ DROPS OPHTHALMIC EVERY 8 HOURS PRN
COMMUNITY

## 2021-08-10 RX ORDER — VIT C/E/ZN/COPPR/LUTEIN/ZEAXAN 60 MG-6 MG
1 CAPSULE ORAL 2 TIMES DAILY
Status: DISCONTINUED | OUTPATIENT
Start: 2021-08-10 | End: 2021-08-12 | Stop reason: HOSPADM

## 2021-08-10 RX ORDER — HYDROCODONE BITARTRATE AND ACETAMINOPHEN 5; 325 MG/1; MG/1
1 TABLET ORAL EVERY 6 HOURS PRN
Status: DISCONTINUED | OUTPATIENT
Start: 2021-08-10 | End: 2021-08-12 | Stop reason: HOSPADM

## 2021-08-10 RX ORDER — ACETAMINOPHEN 325 MG/1
650 TABLET ORAL EVERY 4 HOURS PRN
Status: DISCONTINUED | OUTPATIENT
Start: 2021-08-10 | End: 2021-08-12 | Stop reason: HOSPADM

## 2021-08-10 RX ORDER — DEXTROSE MONOHYDRATE 25 G/50ML
12.5 INJECTION, SOLUTION INTRAVENOUS PRN
Status: DISCONTINUED | OUTPATIENT
Start: 2021-08-10 | End: 2021-08-12 | Stop reason: HOSPADM

## 2021-08-10 RX ORDER — ISOSORBIDE MONONITRATE 60 MG/1
60 TABLET, EXTENDED RELEASE ORAL DAILY
Status: DISCONTINUED | OUTPATIENT
Start: 2021-08-10 | End: 2021-08-12 | Stop reason: HOSPADM

## 2021-08-10 RX ORDER — NICOTINE POLACRILEX 4 MG
15 LOZENGE BUCCAL PRN
Status: DISCONTINUED | OUTPATIENT
Start: 2021-08-10 | End: 2021-08-12 | Stop reason: HOSPADM

## 2021-08-10 RX ORDER — ATORVASTATIN CALCIUM 20 MG/1
20 TABLET, FILM COATED ORAL NIGHTLY
Status: DISCONTINUED | OUTPATIENT
Start: 2021-08-10 | End: 2021-08-12 | Stop reason: HOSPADM

## 2021-08-10 RX ORDER — TRIHEXYPHENIDYL HYDROCHLORIDE 2 MG/1
2 TABLET ORAL 2 TIMES DAILY
COMMUNITY

## 2021-08-10 RX ADMIN — RIVASTIGMINE TARTRATE 3 MG: 3 CAPSULE ORAL at 20:12

## 2021-08-10 RX ADMIN — SODIUM CHLORIDE 1000 ML: 9 INJECTION, SOLUTION INTRAVENOUS at 12:15

## 2021-08-10 RX ADMIN — PANTOPRAZOLE SODIUM 40 MG: 40 TABLET, DELAYED RELEASE ORAL at 20:11

## 2021-08-10 RX ADMIN — ENOXAPARIN SODIUM 40 MG: 100 INJECTION SUBCUTANEOUS at 20:11

## 2021-08-10 RX ADMIN — LEVOTHYROXINE SODIUM 175 MCG: 0.17 TABLET ORAL at 20:11

## 2021-08-10 RX ADMIN — MONTELUKAST SODIUM 10 MG: 10 TABLET ORAL at 20:11

## 2021-08-10 RX ADMIN — ISOSORBIDE MONONITRATE 60 MG: 60 TABLET ORAL at 20:11

## 2021-08-10 RX ADMIN — METFORMIN HYDROCHLORIDE 1000 MG: 1000 TABLET ORAL at 20:12

## 2021-08-10 RX ADMIN — TRIHEXYPHENIDYL HYDROCHLORIDE 2 MG: 2 TABLET ORAL at 21:35

## 2021-08-10 RX ADMIN — SODIUM CHLORIDE 1000 ML: 9 INJECTION, SOLUTION INTRAVENOUS at 15:13

## 2021-08-10 RX ADMIN — DIVALPROEX SODIUM 500 MG: 500 TABLET, DELAYED RELEASE ORAL at 20:12

## 2021-08-10 RX ADMIN — BUDESONIDE 500 MCG: 0.5 SUSPENSION RESPIRATORY (INHALATION) at 19:22

## 2021-08-10 RX ADMIN — ARFORMOTEROL TARTRATE 15 MCG: 15 SOLUTION RESPIRATORY (INHALATION) at 19:22

## 2021-08-10 RX ADMIN — ASPIRIN 325 MG ORAL TABLET 325 MG: 325 PILL ORAL at 20:11

## 2021-08-10 RX ADMIN — Medication 1 CAPSULE: at 20:11

## 2021-08-10 RX ADMIN — CLOPIDOGREL 75 MG: 75 TABLET, FILM COATED ORAL at 20:11

## 2021-08-10 RX ADMIN — ATORVASTATIN CALCIUM 20 MG: 20 TABLET, FILM COATED ORAL at 20:11

## 2021-08-10 RX ADMIN — SODIUM CHLORIDE: 9 INJECTION, SOLUTION INTRAVENOUS at 22:02

## 2021-08-10 RX ADMIN — SENNOSIDES 8.6 MG: 8.6 TABLET, FILM COATED ORAL at 20:12

## 2021-08-10 ASSESSMENT — PAIN SCALES - GENERAL
PAINLEVEL_OUTOF10: 0
PAINLEVEL_OUTOF10: 0

## 2021-08-10 NOTE — ED NOTES
Pt removed external catheter. Pt changed for incontinence of urine. Taylor care performed.      Opal Moore RN  08/10/21 2514

## 2021-08-10 NOTE — ED NOTES
Attempted to straight cath pt for urine obtainment. Unable to obtain with assistance of GRAHAM Bueno.  informed. External male cath applied.       Odilia Casiano RN  08/10/21 7560

## 2021-08-10 NOTE — ED PROVIDER NOTES
or rhinorrhea. Mouth/Throat:      Mouth: Mucous membranes are moist.      Pharynx: Oropharynx is clear. No posterior oropharyngeal erythema. Eyes:      General: No scleral icterus. Right eye: No discharge. Left eye: No discharge. Extraocular Movements: Extraocular movements intact. Conjunctiva/sclera: Conjunctivae normal.      Pupils: Pupils are equal, round, and reactive to light. Cardiovascular:      Rate and Rhythm: Normal rate and regular rhythm. Heart sounds: Normal heart sounds. No murmur heard. No friction rub. No gallop. Comments: Upper extremity and lower extremity distal pulses intact bilaterally +2/4  Pulmonary:      Effort: Pulmonary effort is normal. No respiratory distress. Breath sounds: Normal breath sounds. No wheezing, rhonchi or rales. Comments: Good air movement noted throughout. Chest:      Chest wall: No tenderness. Abdominal:      General: There is no distension. Palpations: Abdomen is soft. There is no mass. Tenderness: There is no abdominal tenderness. There is no guarding or rebound. Genitourinary:     Comments: Patient with dual channel urethra. Musculoskeletal:         General: No tenderness or deformity. Normal range of motion. Cervical back: Normal range of motion and neck supple. No rigidity. No muscular tenderness. Right lower leg: Edema (trace) present. Left lower leg: Edema (trace) present. Lymphadenopathy:      Cervical: No cervical adenopathy. Skin:     General: Skin is warm and dry. Capillary Refill: Capillary refill takes less than 2 seconds. Findings: No erythema or rash. Neurological:      General: No focal deficit present. Mental Status: He is alert and oriented to person, place, and time. GCS: GCS eye subscore is 4. GCS verbal subscore is 3. GCS motor subscore is 6. Cranial Nerves: No dysarthria or facial asymmetry. Sensory: No sensory deficit. Motor: No weakness. Coordination: Coordination normal.      Comments: Comprehensive neuro exam was difficult to obtain due to the patient's baseline developmental disability and poor attention.        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 Past Surgical History:  has a past surgical history that includes Cardiac surgery; Coronary angioplasty with stent (12/2011); knee surgery; other surgical history (Left, 2/11/2015); other surgical history (3/2/15); other surgical history (Left, 3/2/2015); and eye surgery (Right, 8-). Social History:  reports that he quit smoking about 8 months ago. His smoking use included cigarettes. He started smoking about 45 years ago. He has a 22.00 pack-year smoking history. He has never used smokeless tobacco. He reports that he does not drink alcohol and does not use drugs. Family History: family history includes Cancer in his mother; Heart Disease in his father.      Home Meds: Medications Prior to Admission: fluticasone-salmeterol (ADVAIR) 250-50 MCG/DOSE AEPB, Inhale 2 puffs into the lungs 2 times daily  albuterol (PROVENTIL) 2.5 MG/0.5ML NEBU nebulizer solution, Take 2.5 mg by nebulization every 6 hours as needed for Wheezing  aspirin 325 MG tablet, Take 325 mg by mouth daily  clopidogrel (PLAVIX) 75 MG tablet, Take 75 mg by mouth nightly  divalproex (DEPAKOTE) 500 MG DR tablet, Take 500 mg by mouth 3 times daily  glucagon, rDNA, 1 MG injection, Inject 1 mg into the muscle as needed for Low blood sugar  isosorbide mononitrate (IMDUR) 60 MG extended release tablet, Take 60 mg by mouth daily  ammonium lactate (AMLACTIN) 12 % cream, Apply topically every 12 hours as needed for Dry Skin (Apply to entire body)  Multiple Vitamins-Minerals (THERAPEUTIC MULTIVITAMIN-MINERALS) tablet, Take 1 tablet by mouth daily  HYDROcodone-acetaminophen (NORCO) 5-325 MG per tablet, Take 1 tablet by mouth every 6 hours as needed for Pain.   insulin aspart (NOVOLOG) 100 UNIT/ML injection vial, Inject 0-12 Units into the skin 3 times daily (before meals) *Per Sliding Scale*  dextromethorphan-quiNIDine (NUEDEXTA) 20-10 MG CAPS per capsule, Take 1 capsule by mouth 2 times daily  polyvinyl alcohol (LIQUIFILM TEARS) 1.4 % ophthalmic solution, Place 1 drop into both eyes every 8 hours as needed for Dry Eyes  Multiple Vitamins-Minerals (PRESERVISION AREDS 2) CAPS, Take 1 capsule by mouth 2 times daily  pantoprazole (PROTONIX) 40 MG tablet, Take 40 mg by mouth daily  rivastigmine (EXELON) 3 MG capsule, Take 3 mg by mouth 2 times daily  senna (SENOKOT) 8.6 MG tablet, Take 1 tablet by mouth daily  trihexyphenidyl (ARTANE) 2 MG tablet, Take 2 mg by mouth 2 times daily  amoxicillin-clavulanate (AUGMENTIN) 875-125 MG per tablet, Take 1 tablet by mouth 2 times daily For 5 days  [START ON 8/11/2021] azithromycin (ZITHROMAX) 250 MG tablet, Take 250 mg by mouth daily Give for 4 days, evening  erythromycin (ROMYCIN) 5 MG/GM ophthalmic ointment, Place into the right eye 2 times daily  divalproex (DEPAKOTE) 500 MG DR tablet, Take 500 mg by mouth 3 times daily  furosemide (LASIX) 20 MG tablet, Take 1 tablet by mouth daily  albuterol-ipratropium (COMBIVENT RESPIMAT)  MCG/ACT AERS inhaler, Inhale 1 puff into the lungs every 4 hours as needed for Wheezing or Shortness of Breath  metFORMIN (GLUCOPHAGE) 1000 MG tablet, Take 1,000 mg by mouth 2 times daily (with meals)  levothyroxine (SYNTHROID) 175 MCG tablet, Take 175 mcg by mouth Daily   atorvastatin (LIPITOR) 20 MG tablet, Take 20 mg by mouth nightly Negative    Ketones, Urine TRACE (A) Negative mg/dL    Specific Gravity, UA 1.025 1.005 - 1.030    Blood, Urine LARGE (A) Negative    pH, UA 5.0 5.0 - 9.0    Protein,  (A) Negative mg/dL    Urobilinogen, Urine 1.0 <2.0 E.U./dL    Nitrite, Urine Negative Negative    Leukocyte Esterase, Urine TRACE (A) Negative   CBC Auto Differential   Result Value Ref Range    WBC 8.3 4.5 - 11.5 E9/L    RBC 4.51 3.80 - 5.80 E12/L    Hemoglobin 12.0 (L) 12.5 - 16.5 g/dL    Hematocrit 38.5 37.0 - 54.0 %    MCV 85.4 80.0 - 99.9 fL    MCH 26.6 26.0 - 35.0 pg    MCHC 31.2 (L) 32.0 - 34.5 %    RDW 18.2 (H) 11.5 - 15.0 fL    Platelets 099 418 - 659 E9/L    MPV 10.8 7.0 - 12.0 fL    Neutrophils % 73.9 43.0 - 80.0 %    Immature Granulocytes % 0.4 0.0 - 5.0 %    Lymphocytes % 13.1 (L) 20.0 - 42.0 %    Monocytes % 11.5 2.0 - 12.0 %    Eosinophils % 0.7 0.0 - 6.0 %    Basophils % 0.4 0.0 - 2.0 %    Neutrophils Absolute 6.10 1.80 - 7.30 E9/L    Immature Granulocytes # 0.03 E9/L    Lymphocytes Absolute 1.08 (L) 1.50 - 4.00 E9/L    Monocytes Absolute 0.95 0.10 - 0.95 E9/L    Eosinophils Absolute 0.06 0.05 - 0.50 E9/L    Basophils Absolute 0.03 0.00 - 0.20 E9/L   Comprehensive Metabolic Panel w/ Reflex to MG   Result Value Ref Range    Sodium 141 132 - 146 mmol/L    Potassium reflex Magnesium 4.6 3.5 - 5.0 mmol/L    Chloride 104 98 - 107 mmol/L    CO2 24 22 - 29 mmol/L    Anion Gap 13 7 - 16 mmol/L    Glucose 158 (H) 74 - 99 mg/dL    BUN 39 (H) 6 - 23 mg/dL    CREATININE 1.8 (H) 0.7 - 1.2 mg/dL    GFR Non-African American 37 >=60 mL/min/1.73    GFR African American 45     Calcium 8.9 8.6 - 10.2 mg/dL    Total Protein 6.3 (L) 6.4 - 8.3 g/dL    Albumin 3.0 (L) 3.5 - 5.2 g/dL    Total Bilirubin 0.6 0.0 - 1.2 mg/dL    Alkaline Phosphatase 76 40 - 129 U/L    ALT 35 0 - 40 U/L    AST 28 0 - 39 U/L   Lactic Acid, Plasma   Result Value Ref Range    Lactic Acid 3.1 (H) 0.5 - 2.2 mmol/L   Microscopic Urinalysis   Result Value Ref Range    WBC, UA 0-1 0 - 5 /HPF    RBC, UA 1-3 0 - 2 /HPF    Bacteria, UA RARE (A) None Seen /HPF   POCT Glucose   Result Value Ref Range    Meter Glucose 109 (H) 74 - 99 mg/dL       RADIOLOGY: Interpreted by Radiologist unless otherwise noted. CT Head WO Contrast   Final Result   No acute intracranial abnormality. There are diffuse involutional changes,   with prominence of the ventricles and sulci. CT Cervical Spine WO Contrast   Final Result   No acute osseous abnormality of the cervical spine.              Oxygen Saturation Interpretation: Normal    Meds Given:  Medications   acetaminophen (TYLENOL) tablet 650 mg (has no administration in time range)   albuterol (ACCUNEB) nebulizer solution 2.5 mg (has no administration in time range)   ipratropium-albuterol (DUONEB) nebulizer solution 1 ampule (has no administration in time range)   ammonium lactate (LAC-HYDRIN) 12 % lotion (has no administration in time range)   aspirin tablet 325 mg (325 mg Oral Given 8/10/21 2011)   atorvastatin (LIPITOR) tablet 20 mg (20 mg Oral Given 8/10/21 2011)   clopidogrel (PLAVIX) tablet 75 mg (75 mg Oral Given 8/10/21 2011)   dextromethorphan-quiNIDine (NUEDEXTA) 20-10 MG per capsule 1 capsule (1 capsule Oral Not Given 8/10/21 2012)   divalproex (DEPAKOTE) DR tablet 500 mg (500 mg Oral Given 8/10/21 2012)   HYDROcodone-acetaminophen (NORCO) 5-325 MG per tablet 1 tablet (has no administration in time range)   isosorbide mononitrate (IMDUR) extended release tablet 60 mg (60 mg Oral Given 8/10/21 2011)   levothyroxine (SYNTHROID) tablet 175 mcg (175 mcg Oral Given 8/10/21 2011)   metFORMIN (GLUCOPHAGE) tablet 1,000 mg (1,000 mg Oral Given 8/10/21 2012)   montelukast (SINGULAIR) tablet 10 mg (10 mg Oral Given 8/10/21 2011)   ocuvite-lutein multivitamin 1 capsule (1 capsule Oral Given 8/10/21 2011)   pantoprazole (PROTONIX) tablet 40 mg (40 mg Oral Given 8/10/21 2011)   polyvinyl alcohol (LIQUIFILM TEARS) 1.4 % ophthalmic solution 1 drop (has no administration in time range)   rivastigmine (EXELON) capsule 3 mg (3 mg Oral Given 8/10/21 2012)   senna (SENOKOT) tablet 8.6 mg (8.6 mg Oral Given 8/10/21 2012)   trihexyphenidyl (ARTANE) tablet 2 mg (2 mg Oral Given 8/10/21 2135)   0.9 % sodium chloride infusion ( Intravenous New Bag 8/10/21 2202)   enoxaparin (LOVENOX) injection 40 mg (40 mg Subcutaneous Given 8/10/21 2011)   insulin lispro (HUMALOG) injection vial 0-12 Units ( Subcutaneous Canceled Entry 8/10/21 1929)   insulin lispro (HUMALOG) injection vial 0-6 Units (0 Units Subcutaneous Not Given 8/10/21 2202)   glucose (GLUTOSE) 40 % oral gel 15 g (has no administration in time range)   dextrose 50 % IV solution (has no administration in time range)   glucagon (rDNA) injection 1 mg (has no administration in time range)   dextrose 5 % solution (has no administration in time range)   budesonide (PULMICORT) nebulizer suspension 500 mcg (500 mcg Nebulization Given 8/10/21 1922)     And   Arformoterol Tartrate (BROVANA) nebulizer solution 15 mcg (15 mcg Nebulization Given 8/10/21 1922)   0.9 % sodium chloride bolus (0 mLs Intravenous Stopped 8/10/21 1315)   0.9 % sodium chloride bolus (1,000 mLs Intravenous New Bag 8/10/21 1513)       Procedures:  No procedures performed. --------------------------------- PROGRESS NOTES / ADDITIONAL PROVIDER NOTES ---------------------------------  Consultations:  As outlined below. ED Course:    ED Course as of Aug 10 2300   Tue Aug 10, 2021   1538 Discussed the case with Dr. Charo Rasmussen of internal medicine. He agrees to admit the patient for further evaluation management. [ML]      ED Course User Index  [ML] Tashia Leonardo DO       2801: All results were discussed with the patient and I have provided specific details regarding the plan to admit the patient. The patient was stable at the time of admission and was without objective evidence of hemodynamic instability.  The patient was seen in the emergency department by myself and the assigned attending physician, Dr. Gayle Wright, who agreed with the assessment, plan and decision to admit as laid out herein. The patient verbalized an understanding and agreement with the plan for admission. All questions were answered and the patient was deemed to be in stable condition at the time of transport. MDM:  Patient presented from his NH where there were concerns for AMS and frequent falls. On arrival, the patient was mildly hypotensive with remaining VS wnl. Physical exam was as documented above. A work up was initiated to further evaluate his presenting complaint. Labs were remarkable for a negative COVID, elevated lactate and JOSH with Cr of 1.8. CTs of the head and neck were negative for acute pathology. Given the patient's history and new JOSH, the decision was made to admit him to the hospital for further evaluation and management. The case was discussed with IM who agreed to admit. The patient was stable at the time of his disposition. This patient's ED course included: a personal history and physicial examination, re-evaluation prior to disposition, multiple bedside re-evaluations, IV medications, cardiac monitoring and continuous pulse oximetry    Diagnosis:  1. JOSH (acute kidney injury) ([facility]        Disposition:  Patient's disposition: Admit to telemetry  Patient's condition is stable. This patient was seen, examined and treated with Dr. Gayle Wright. All pertinent aspects of the patient's care were discussed with the attending physician.        Liudmila Lo DO  Resident  08/10/21 0533

## 2021-08-10 NOTE — LETTER
[place] Department Medicaid  CERTIFICATION OF NECESSITY  FOR NON-EMERGENCY TRANSPORTATION   BY GROUND AMBULANCE      Individual Information   1. Name: Chanel Newman 2. [place] Medicaid Billing Number:    3. Address: [facility] [address] [place] [address]      Transportation Provider Information   4. Provider Name:    5. [place] Medicaid Provider Number:  National Provider Identifier (NPI):      Certification  7. Criteria:  During transport, this individual requires:  [x] Medical treatment or continuous     supervision by an EMT. [] The administration or regulation of oxygen by another person. [] Supervised protective restraint. 8. Period Beginning Date:    5. Length  [x] Not more than 7 day(s)  [] One Year     Additional Information Relevant to Certification   10. Comments or Explanations, If Necessary or Appropriate   Altered mental status, frequent falls, CVA, alert to self only. Certifying Practitioner Information   11. Name of Practitioner:    12. [place] Medicaid Provider Number, If Applicable:  [address] Provider Identifier (NPI):      Signature Information   14. Date of Signature: 2021  15. Name of Person Signing: Brian Ponce RN    16. Signature and Professional Designation: Brian Ponce RN, CM     ODM [id number]  Rev. 2015  [address] Encounter Date/Time: 8/10/2021 Ori Sheldon Account: [de-identified]    MRN: [id number]    Patient: Logan Kelly Serial #: [id number]      ENCOUNTER          Patient Class: I Private Enc?   No Unit RM BD: SEY[facility] 4S PICU 4516/4516-B   Hospital Service: Intermediate   Encounter DX: JOSH (acute kidney injury*   ADM Provider: Rene Jaramillo MD   Procedure:     ATT Provider: Rene Jaramillo MD   REF Provider:        Admission DX: JOSH (acute kidney injury) ([facility] and DX codes: N17.9      PATIENT                 Name: Chanel Newman : 1950 (71 yrs)   Address: [facility], [address] Sex: Male   City: [facility]

## 2021-08-11 LAB
ALBUMIN SERPL-MCNC: 2.6 G/DL (ref 3.5–5.2)
ALP BLD-CCNC: 67 U/L (ref 40–129)
ALT SERPL-CCNC: 40 U/L (ref 0–40)
ANION GAP SERPL CALCULATED.3IONS-SCNC: 10 MMOL/L (ref 7–16)
AST SERPL-CCNC: 37 U/L (ref 0–39)
BILIRUB SERPL-MCNC: 0.6 MG/DL (ref 0–1.2)
BUN BLDV-MCNC: 33 MG/DL (ref 6–23)
CALCIUM SERPL-MCNC: 8.2 MG/DL (ref 8.6–10.2)
CHLORIDE BLD-SCNC: 109 MMOL/L (ref 98–107)
CO2: 23 MMOL/L (ref 22–29)
CREAT SERPL-MCNC: 1.3 MG/DL (ref 0.7–1.2)
GFR AFRICAN AMERICAN: >60
GFR NON-AFRICAN AMERICAN: 54 ML/MIN/1.73
GLUCOSE BLD-MCNC: 81 MG/DL (ref 74–99)
METER GLUCOSE: 116 MG/DL (ref 74–99)
METER GLUCOSE: 146 MG/DL (ref 74–99)
METER GLUCOSE: 153 MG/DL (ref 74–99)
POTASSIUM SERPL-SCNC: 4.7 MMOL/L (ref 3.5–5)
SODIUM BLD-SCNC: 142 MMOL/L (ref 132–146)
TOTAL PROTEIN: 5.4 G/DL (ref 6.4–8.3)

## 2021-08-11 PROCEDURE — 6360000002 HC RX W HCPCS: Performed by: INTERNAL MEDICINE

## 2021-08-11 PROCEDURE — 36415 COLL VENOUS BLD VENIPUNCTURE: CPT

## 2021-08-11 PROCEDURE — 6370000000 HC RX 637 (ALT 250 FOR IP): Performed by: INTERNAL MEDICINE

## 2021-08-11 PROCEDURE — 97530 THERAPEUTIC ACTIVITIES: CPT

## 2021-08-11 PROCEDURE — 97161 PT EVAL LOW COMPLEX 20 MIN: CPT

## 2021-08-11 PROCEDURE — 80053 COMPREHEN METABOLIC PANEL: CPT

## 2021-08-11 PROCEDURE — 94660 CPAP INITIATION&MGMT: CPT

## 2021-08-11 PROCEDURE — 94640 AIRWAY INHALATION TREATMENT: CPT

## 2021-08-11 PROCEDURE — 82962 GLUCOSE BLOOD TEST: CPT

## 2021-08-11 PROCEDURE — 2580000003 HC RX 258: Performed by: INTERNAL MEDICINE

## 2021-08-11 PROCEDURE — 2060000000 HC ICU INTERMEDIATE R&B

## 2021-08-11 RX ADMIN — ARFORMOTEROL TARTRATE 15 MCG: 15 SOLUTION RESPIRATORY (INHALATION) at 20:40

## 2021-08-11 RX ADMIN — ATORVASTATIN CALCIUM 20 MG: 20 TABLET, FILM COATED ORAL at 21:46

## 2021-08-11 RX ADMIN — BUDESONIDE 500 MCG: 0.5 SUSPENSION RESPIRATORY (INHALATION) at 08:58

## 2021-08-11 RX ADMIN — ARFORMOTEROL TARTRATE 15 MCG: 15 SOLUTION RESPIRATORY (INHALATION) at 08:58

## 2021-08-11 RX ADMIN — DEXTROMETHORPHAN HYDROBROMIDE AND QUINIDINE SULFATE 1 CAPSULE: 20; 10 CAPSULE, GELATIN COATED ORAL at 20:26

## 2021-08-11 RX ADMIN — DIVALPROEX SODIUM 500 MG: 500 TABLET, DELAYED RELEASE ORAL at 16:30

## 2021-08-11 RX ADMIN — MONTELUKAST SODIUM 10 MG: 10 TABLET ORAL at 21:46

## 2021-08-11 RX ADMIN — SENNOSIDES 8.6 MG: 8.6 TABLET, FILM COATED ORAL at 16:30

## 2021-08-11 RX ADMIN — Medication 1 CAPSULE: at 16:30

## 2021-08-11 RX ADMIN — METFORMIN HYDROCHLORIDE 1000 MG: 1000 TABLET ORAL at 16:30

## 2021-08-11 RX ADMIN — LEVOTHYROXINE SODIUM 175 MCG: 0.17 TABLET ORAL at 06:13

## 2021-08-11 RX ADMIN — ISOSORBIDE MONONITRATE 60 MG: 60 TABLET ORAL at 16:30

## 2021-08-11 RX ADMIN — TRIHEXYPHENIDYL HYDROCHLORIDE 2 MG: 2 TABLET ORAL at 16:06

## 2021-08-11 RX ADMIN — SODIUM CHLORIDE: 9 INJECTION, SOLUTION INTRAVENOUS at 08:59

## 2021-08-11 RX ADMIN — RIVASTIGMINE TARTRATE 3 MG: 3 CAPSULE ORAL at 16:30

## 2021-08-11 RX ADMIN — Medication 1 CAPSULE: at 21:46

## 2021-08-11 RX ADMIN — CLOPIDOGREL 75 MG: 75 TABLET, FILM COATED ORAL at 21:46

## 2021-08-11 RX ADMIN — TRIHEXYPHENIDYL HYDROCHLORIDE 2 MG: 2 TABLET ORAL at 22:37

## 2021-08-11 RX ADMIN — BUDESONIDE 500 MCG: 0.5 SUSPENSION RESPIRATORY (INHALATION) at 20:40

## 2021-08-11 RX ADMIN — DIVALPROEX SODIUM 500 MG: 500 TABLET, DELAYED RELEASE ORAL at 21:46

## 2021-08-11 RX ADMIN — RIVASTIGMINE TARTRATE 3 MG: 3 CAPSULE ORAL at 21:46

## 2021-08-11 RX ADMIN — ENOXAPARIN SODIUM 40 MG: 100 INJECTION SUBCUTANEOUS at 16:30

## 2021-08-11 RX ADMIN — PANTOPRAZOLE SODIUM 40 MG: 40 TABLET, DELAYED RELEASE ORAL at 16:30

## 2021-08-11 RX ADMIN — ASPIRIN 325 MG ORAL TABLET 325 MG: 325 PILL ORAL at 16:30

## 2021-08-11 ASSESSMENT — PAIN SCALES - GENERAL
PAINLEVEL_OUTOF10: 0
PAINLEVEL_OUTOF10: 0

## 2021-08-11 NOTE — CARE COORDINATION
8/11/2021 - Care Coordination - admitted for JOSH. Has had worsening mentation and frequent falls. Cr 1.8 on admission, Cr 1.3 today. IV NS @ 100 cc/hr. Pt is from [facility] - spoke with Alessandro Ceron. He is a bed hold. He has been vaccinated and will not need a covid to return to the facility. Ambulance form completed and placed in envelope on soft chart. PAULY/DIYA will follow.

## 2021-08-11 NOTE — H&P
Kaylyn Moffett MD FACP  Internal medicine   History and Physical      CHIEF COMPLAINT: Altered mental status      HISTORY OF PRESENT ILLNESS:    68-year-old  man seen on the floor earlier this morning  Spoke with ER physician at the time of admission  Data reviewed in detail  Patient is currently a resident at a local nursing home  History of prior stroke  Recently was found to have altered sensorium apparently related to UTI  Has been on Augmentin  Patient is a poor historian  He was brought to ER with worsening mental status  Worsening creatinine was noted  Admitted for further management  He states he feels fine this morning    Past Medical History:    Past Medical History:   Diagnosis Date    Arthritis     Asthma     Bilateral carotid artery stenosis 10/1/2020    CAD (coronary artery disease) 2001    FREDA to RCA    COPD (chronic obstructive pulmonary disease) ([facility]     Diabetes mellitus ([facility]     Marcus's gangrene     History of tobacco use 4/8/2021    Hyperlipidemia     Hypothyroidism     DEMARCUS (obstructive sleep apnea)     no cpap or bipap     Pneumonia 2/5/2015    Stroke [facility])        Past Surgical History:    Past Surgical History:   Procedure Laterality Date    CARDIAC SURGERY      CORONARY ANGIOPLASTY WITH STENT PLACEMENT  12/2011    EYE SURGERY Right 8-    right eye cataract extraction with intraocular lens  implant    KNEE SURGERY      OTHER SURGICAL HISTORY Left 2/11/2015    excision and debridement left groin and scrotom    OTHER SURGICAL HISTORY  3/2/15    perineal wound check    OTHER SURGICAL HISTORY Left 3/2/2015    incision and drainage of scrotal abscesses and left fortino abscess by Dr Ning Schaeffer       Medications Prior to Admission:    Medications Prior to Admission: fluticasone-salmeterol (ADVAIR) 250-50 MCG/DOSE AEPB, Inhale 2 puffs into the lungs 2 times daily  albuterol (PROVENTIL) 2.5 MG/0.5ML NEBU nebulizer solution, Take 2.5 mg by nebulization every 6 hours as needed for Wheezing  aspirin 325 MG tablet, Take 325 mg by mouth daily  clopidogrel (PLAVIX) 75 MG tablet, Take 75 mg by mouth nightly  divalproex (DEPAKOTE) 500 MG DR tablet, Take 500 mg by mouth 3 times daily  glucagon, rDNA, 1 MG injection, Inject 1 mg into the muscle as needed for Low blood sugar  isosorbide mononitrate (IMDUR) 60 MG extended release tablet, Take 60 mg by mouth daily  ammonium lactate (AMLACTIN) 12 % cream, Apply topically every 12 hours as needed for Dry Skin (Apply to entire body)  Multiple Vitamins-Minerals (THERAPEUTIC MULTIVITAMIN-MINERALS) tablet, Take 1 tablet by mouth daily  HYDROcodone-acetaminophen (NORCO) 5-325 MG per tablet, Take 1 tablet by mouth every 6 hours as needed for Pain.   insulin aspart (NOVOLOG) 100 UNIT/ML injection vial, Inject 0-12 Units into the skin 3 times daily (before meals) *Per Sliding Scale*  dextromethorphan-quiNIDine (NUEDEXTA) 20-10 MG CAPS per capsule, Take 1 capsule by mouth 2 times daily  polyvinyl alcohol (LIQUIFILM TEARS) 1.4 % ophthalmic solution, Place 1 drop into both eyes every 8 hours as needed for Dry Eyes  Multiple Vitamins-Minerals (PRESERVISION AREDS 2) CAPS, Take 1 capsule by mouth 2 times daily  pantoprazole (PROTONIX) 40 MG tablet, Take 40 mg by mouth daily  rivastigmine (EXELON) 3 MG capsule, Take 3 mg by mouth 2 times daily  senna (SENOKOT) 8.6 MG tablet, Take 1 tablet by mouth daily  trihexyphenidyl (ARTANE) 2 MG tablet, Take 2 mg by mouth 2 times daily  amoxicillin-clavulanate (AUGMENTIN) 875-125 MG per tablet, Take 1 tablet by mouth 2 times daily For 5 days  azithromycin (ZITHROMAX) 250 MG tablet, Take 250 mg by mouth daily Give for 4 days, evening  erythromycin (ROMYCIN) 5 MG/GM ophthalmic ointment, Place into the right eye 2 times daily  divalproex (DEPAKOTE) 500 MG DR tablet, Take 500 mg by mouth 3 times daily  furosemide (LASIX) 20 MG tablet, Take 1 tablet by mouth daily  albuterol-ipratropium (COMBIVENT RESPIMAT)  MCG/ACT AERS inhaler, Inhale 1 puff into the lungs every 4 hours as needed for Wheezing or Shortness of Breath  metFORMIN (GLUCOPHAGE) 1000 MG tablet, Take 1,000 mg by mouth 2 times daily (with meals)  levothyroxine (SYNTHROID) 175 MCG tablet, Take 175 mcg by mouth Daily   atorvastatin (LIPITOR) 20 MG tablet, Take 20 mg by mouth nightly   montelukast (SINGULAIR) 10 MG tablet, Take 1 tablet by mouth nightly. [DISCONTINUED] amoxicillin-clavulanate (AUGMENTIN) 875-125 MG per tablet, Take 1 tablet by mouth 2 times daily  [DISCONTINUED] carboxymethylcellulose 1 % ophthalmic solution, Place 1 drop into both eyes 4 times daily  [DISCONTINUED] erythromycin (ROMYCIN) 5 MG/GM ophthalmic ointment, Place 1 Dose into the right eye 2 times daily  [DISCONTINUED] medroxyPROGESTERone (PROVERA) 10 MG tablet, Take 30 mg by mouth 2 times daily  budesonide-formoterol (SYMBICORT) 160-4.5 MCG/ACT AERO, Inhale 2 puffs into the lungs 2 times daily  acetaminophen 650 MG TABS, Take 650 mg by mouth every 4 hours as needed. Allergies:    Patient has no known allergies. Social History:    reports that he quit smoking about 8 months ago. His smoking use included cigarettes. He started smoking about 45 years ago. He has a 22.00 pack-year smoking history. He has never used smokeless tobacco. He reports that he does not drink alcohol and does not use drugs. Family History:   family history includes Cancer in his mother; Heart Disease in his father. REVIEW OF SYSTEMS:  As above in the HPI, otherwise negative    PHYSICAL EXAM:    Vitals:  /77   Pulse 79   Temp 97.2 °F (36.2 °C) (Oral)   Resp 20   Ht 5' 7\" (1.702 m)   Wt 223 lb (101.2 kg)   SpO2 96%   BMI 34.93 kg/m²     General:  Awake, alert, difficult to assess orientation due to his mental incapacity  . Well developed, well nourished, well groomed. No apparent distress. HEENT:  Normocephalic, atraumatic. Pupils equal, round, reactive to light. No scleral icterus.   No conjunctival injection. Normal lips, teeth, and gums. No nasal discharge. Neck:  Supple  Heart:  RRR, no murmurs, gallops, rubs  Lungs:  CTA bilaterally, bilat symmetrical expansion, no wheeze, rales, or rhonchi  Abdomen: Bowel sounds present, soft, nontender, no masses, no organomegaly, no peritoneal signs  Extremities:  No clubbing, cyanosis, or edema  Skin:  Warm and dry, no open lesions or rash  Neuro:  Cranial nerves 2-12 intact, no focal deficits  Breast: deferred  Rectal: deferred  Genitalia:  deferred    LABS:  Data reviewed in detail      ASSESSMENT:      Active Problems:    JOSH (acute kidney injury) ([address]  Recent UTI on Augmentin  No source of infection identified at present  Multiple comorbidities.   Patient Active Problem List   Diagnosis    CAD (coronary artery disease)    Diabetes mellitus ([address]    Hyperlipidemia    DEMARCUS (obstructive sleep apnea)    COPD (chronic obstructive pulmonary disease) ([address]    ASHD (arteriosclerotic heart disease)    History of stroke    Bilateral carotid artery stenosis    NSTEMI (non-ST elevated myocardial infarction) ([address]    History of tobacco use    JOSH (acute kidney injury) ([address]             PLAN:  IV hydration  No antibiotics at present  Monitor labs  Otherwise resume home medications    Dalia Hanson MD  2:32 PM  8/11/2021

## 2021-08-11 NOTE — PROGRESS NOTES
Physical Therapy    Physical Therapy Initial Assessment     Name: Peter Blount  : 1950  MRN: [id number]      Date of Service: 2021    Evaluating PT: Mike Ferrer PT, DPT [id number]      Room #:  7839/6583-R  Diagnosis:  JOSH (acute kidney injury) ([facility] [N17.9]  PMHx/PSHx:  DM, hypothyroidism, CAD, COPD, DEMARCUS, asthma, julian's gangrene, bilateral carotid artery stenosis  Precautions:  Fall risk, TAPS, alarm    SUBJECTIVE:    Pt is a poor historian. Per chart, pt was admitted from nursing home. Pt reports he ambulated with walker or cane. OBJECTIVE:   Initial Evaluation  Date: 21 Treatment Date: Short Term/ Long Term   Goals   AM-PAC 6 Clicks      Was pt agreeable to Eval/treatment? Yes     Does pt have pain? No current complaints of pain     Bed Mobility  Rolling: Max A  Supine to sit: Max A  Sit to supine: Max A  Scooting: Max A  Rolling: Min A  Supine to sit: Min A  Sit to supine: Min A  Scooting: Min A   Transfers Sit to stand: Unable  Stand to sit: NT  Stand pivot: NT  Sit to stand: Mod A  Stand to sit: Mod A  Stand pivot: Mod A with [employer]   Ambulation   NT  >10 feet with [employer] with Mod A   Stair negotiation: ascended and descended NT  NA   ROM BUE: Refer to OT note  BLE: WFL     Strength BUE: Refer to OT note  BLE: NT     Balance Sitting EOB: SBA  Dynamic Standing: NT  Sitting EOB: Supervision  Dynamic Standing: Mod A with [employer]     Pt is A & O x: 1 to person. Sensation: Pt denies numbness and tingling of extremities. Edema: Unremarkable. Patient education  Pt educated on PT role in acute care setting.     Patient response to education:   Pt verbalized understanding Pt demonstrated skill Pt requires further education in this area   No NA Yes     ASSESSMENT:    Conditions Requiring Skilled Therapeutic Intervention:    [x]Decreased strength     []Decreased ROM  [x]Decreased functional mobility  [x]Decreased balance   []Decreased endurance   []Decreased posture  []Decreased sensation  []Decreased coordination   []Decreased vision  [x]Decreased safety awareness   []Increased pain       Comments:    Pt was in bed upon room entry, agreeable to PT evaluation. Pt is very confused but able to follow simple commands. Pt requested to ambulate to bathroom to use commode. Pt required assistance for trunk mobility and B LE during supine to sit transfer. Pt had posterior lean when sitting but was able to correct with cueing. Pt attempted sit to stand however was unable on x3 attempts. Pt was assisted with scoot to [address] and back to supine. Pt was positioned comfortably. Pt was left in bed with all needs met at conclusion of session. Treatment:  Patient practiced and was instructed in the following treatment:     Therapeutic activities:  o Bed mobility: Pt was cued for technique during bed mobility transfers. o Transfers: Pt was cued for hand placement and technique during sit to stand transfer attempt.   o Sitting EOB: Pt sat at EOB for 10+ minutes as sitting balance, endurance, and strength were challenged. Pt was cued to correct posterior lean.  o Skillful repositioning to protect skin/joint integrity. Pt's/family goals:  1. None stated. Prognosis is Fair- for reaching above PT goals. Patient and or family understand(s) diagnosis, prognosis, and plan of care.   No.    PHYSICAL THERAPY PLAN OF CARE:    PT POC is established based on physician order and patient diagnosis     Referring provider/PT Order:    Start   Ordering Provider    08/10/21 1830  PT eval and treat Start: 08/10/21 1830, End: 08/10/21 1830, ONE TIME, Standing Count: 1 Occurrences, R      Merline Hawking, MD        Diagnosis:  JOSH (acute kidney injury) ([facility] [N17.9]  Specific instructions for next treatment:  Complete stand and attempt sidesteps    Current Treatment Recommendations:     [x] Strengthening to improve independence with functional mobility   [] ROM to improve independence with functional mobility   [x] Balance Training to improve static/dynamic balance and to reduce fall risk  [] Endurance Training to improve activity tolerance during functional mobility   [x] Transfer Training to improve safety and independence with all functional transfers   [x] Gait Training to improve gait mechanics, endurance and assess need for appropriate assistive device  [] Stair Training in preparation for safe discharge home and/or into the community   [x] Positioning to prevent skin breakdown and contractures  [x] Safety and Education Training   [] Patient/Caregiver Education   [] HEP  [] Other     PT long term treatment goals are located in above grid    Frequency of treatments: 2-5x/week x 3-5 days. Time in  0830  Time out  0850    Total Treatment Time  10 minutes     Evaluation Time includes thorough review of current medical information, gathering information on past medical history/social history and prior level of function, completion of standardized testing/informal observation of tasks, assessment of data and education on plan of care and goals.     CPT codes:  [x] Low Complexity PT evaluation 57163  [] Moderate Complexity PT evaluation 49111  [] High Complexity PT evaluation 98987  [] PT Re-evaluation 98257  [] Gait training 10925 0 minutes  [] Manual therapy 16546 0 minutes  [x] Therapeutic activities 90841 10 minutes  [] Therapeutic exercises 98348 0 minutes  [] Neuromuscular reeducation 69818 0 minutes     Concepcion Larios PT, DPT  [id number]

## 2021-08-11 NOTE — PROGRESS NOTES
Date: 8/10/2021    Time: 10:45 PM    Patient Placed On BIPAP/CPAP/ Non-Invasive Ventilation? Yes    If no must comment. Facial area red/color change? No           If YES are Blister/Lesion present? No   If yes must notify nursing staff  BIPAP/CPAP skin barrier? Yes    Skin barrier type:mepilexlite       Comments:  Pt placed on 18/6 30%.  Titrated to maintain Vt above 300       [employer]

## 2021-08-12 VITALS
OXYGEN SATURATION: 96 % | DIASTOLIC BLOOD PRESSURE: 80 MMHG | HEIGHT: 67 IN | TEMPERATURE: 98.6 F | WEIGHT: 223 LBS | HEART RATE: 80 BPM | SYSTOLIC BLOOD PRESSURE: 116 MMHG | RESPIRATION RATE: 20 BRPM | BODY MASS INDEX: 35 KG/M2

## 2021-08-12 LAB
ALBUMIN SERPL-MCNC: 2.4 G/DL (ref 3.5–5.2)
ALP BLD-CCNC: 73 U/L (ref 40–129)
ALT SERPL-CCNC: 45 U/L (ref 0–40)
ANION GAP SERPL CALCULATED.3IONS-SCNC: 10 MMOL/L (ref 7–16)
AST SERPL-CCNC: 42 U/L (ref 0–39)
BILIRUB SERPL-MCNC: 0.6 MG/DL (ref 0–1.2)
BUN BLDV-MCNC: 28 MG/DL (ref 6–23)
CALCIUM SERPL-MCNC: 8.2 MG/DL (ref 8.6–10.2)
CHLORIDE BLD-SCNC: 111 MMOL/L (ref 98–107)
CO2: 22 MMOL/L (ref 22–29)
CREAT SERPL-MCNC: 1.2 MG/DL (ref 0.7–1.2)
GFR AFRICAN AMERICAN: >60
GFR NON-AFRICAN AMERICAN: 59 ML/MIN/1.73
GLUCOSE BLD-MCNC: 103 MG/DL (ref 74–99)
POTASSIUM SERPL-SCNC: 4.4 MMOL/L (ref 3.5–5)
SODIUM BLD-SCNC: 143 MMOL/L (ref 132–146)
TOTAL PROTEIN: 5.2 G/DL (ref 6.4–8.3)
URINE CULTURE, ROUTINE: NORMAL

## 2021-08-12 PROCEDURE — 80053 COMPREHEN METABOLIC PANEL: CPT

## 2021-08-12 PROCEDURE — 36415 COLL VENOUS BLD VENIPUNCTURE: CPT

## 2021-08-12 PROCEDURE — 94760 N-INVAS EAR/PLS OXIMETRY 1: CPT

## 2021-08-12 PROCEDURE — 94660 CPAP INITIATION&MGMT: CPT

## 2021-08-12 PROCEDURE — 6360000002 HC RX W HCPCS: Performed by: INTERNAL MEDICINE

## 2021-08-12 PROCEDURE — 6370000000 HC RX 637 (ALT 250 FOR IP): Performed by: INTERNAL MEDICINE

## 2021-08-12 PROCEDURE — 94640 AIRWAY INHALATION TREATMENT: CPT

## 2021-08-12 RX ORDER — INSULIN ASPART 100 [IU]/ML
0-12 INJECTION, SOLUTION INTRAVENOUS; SUBCUTANEOUS
Qty: 5 PEN | DISCHARGE
Start: 2021-08-12

## 2021-08-12 RX ORDER — DIVALPROEX SODIUM 500 MG/1
500 TABLET, DELAYED RELEASE ORAL 3 TIMES DAILY
Qty: 90 TABLET | Refills: 3 | DISCHARGE
Start: 2021-08-12

## 2021-08-12 RX ORDER — ISOSORBIDE MONONITRATE 60 MG/1
60 TABLET, EXTENDED RELEASE ORAL DAILY
Qty: 30 TABLET | Refills: 3 | DISCHARGE
Start: 2021-08-12

## 2021-08-12 RX ADMIN — Medication 1 CAPSULE: at 09:10

## 2021-08-12 RX ADMIN — TRIHEXYPHENIDYL HYDROCHLORIDE 2 MG: 2 TABLET ORAL at 09:11

## 2021-08-12 RX ADMIN — ARFORMOTEROL TARTRATE 15 MCG: 15 SOLUTION RESPIRATORY (INHALATION) at 09:01

## 2021-08-12 RX ADMIN — ENOXAPARIN SODIUM 40 MG: 100 INJECTION SUBCUTANEOUS at 09:11

## 2021-08-12 RX ADMIN — BUDESONIDE 500 MCG: 0.5 SUSPENSION RESPIRATORY (INHALATION) at 09:02

## 2021-08-12 RX ADMIN — DEXTROMETHORPHAN HYDROBROMIDE AND QUINIDINE SULFATE 1 CAPSULE: 20; 10 CAPSULE, GELATIN COATED ORAL at 09:10

## 2021-08-12 RX ADMIN — RIVASTIGMINE TARTRATE 3 MG: 3 CAPSULE ORAL at 09:10

## 2021-08-12 RX ADMIN — METFORMIN HYDROCHLORIDE 1000 MG: 1000 TABLET ORAL at 09:10

## 2021-08-12 RX ADMIN — ASPIRIN 325 MG ORAL TABLET 325 MG: 325 PILL ORAL at 09:10

## 2021-08-12 RX ADMIN — ISOSORBIDE MONONITRATE 60 MG: 60 TABLET ORAL at 09:11

## 2021-08-12 RX ADMIN — DIVALPROEX SODIUM 500 MG: 500 TABLET, DELAYED RELEASE ORAL at 09:10

## 2021-08-12 RX ADMIN — LEVOTHYROXINE SODIUM 175 MCG: 0.17 TABLET ORAL at 06:12

## 2021-08-12 RX ADMIN — PANTOPRAZOLE SODIUM 40 MG: 40 TABLET, DELAYED RELEASE ORAL at 09:11

## 2021-08-12 ASSESSMENT — PAIN SCALES - GENERAL: PAINLEVEL_OUTOF10: 0

## 2021-08-12 NOTE — DISCHARGE INSTR - COC
of tobacco use Z87.891    JOSH (acute kidney injury) ([facility] N17.9       Isolation/Infection:   Isolation            No Isolation          Patient Infection Status       Infection Onset Added Last Indicated Last Indicated By Review Planned Expiration Resolved Resolved By    None active    Resolved    COVID-19 Rule Out 08/10/21 08/10/21 08/10/21 COVID-19, Rapid (Ordered)   08/10/21 Rule-Out Test Resulted    COVID-19 Rule Out 21 COVID-19, Rapid (Ordered)   21 Rule-Out Test Resulted    COVID-19 20 COVID-19   20     Detected 2020 outside lab per [facility]    COVID-19 Rule Out 20 COVID-19 (Ordered)   20 Rule-Out Test Resulted    ESBL (Extended Spectrum Beta Lactamase)  04/06/15 04/06/15 Miguelito Roth RN   08/18/15 Miguelito Roth RN    4/2/15 Klebsiella Pneumoniae urine            Nurse Assessment:  Last Vital Signs: BP (!) 146/61   Pulse 75   Temp 97.9 °F (36.6 °C) (Oral)   Resp 20   Ht 5' 7\" (1.702 m)   Wt 223 lb (101.2 kg)   SpO2 95%   BMI 34.93 kg/m²     Last documented pain score (0-10 scale): Pain Level: 0  Last Weight:   Wt Readings from Last 1 Encounters:   08/10/21 223 lb (101.2 kg)     Mental Status:  alert    IV Access:  - None    Nursing Mobility/ADLs:  Walking   Dependent  Transfer  Dependent  Bathing  Dependent  Dressing  Dependent  Toileting  Dependent  Feeding  Dependent  Med Admin  Dependent  Med Delivery   whole    Wound Care Documentation and Therapy:  Wound 21 Hand Distal;Anterior skin tear from IV dressin when patient pulled out.  (Active)   Number of days: 140        Elimination:  Continence:   · Bowel: NO  · Bladder: No  Urinary Catheter: None   Colostomy/Ileostomy/Ileal Conduit: No       Date of Last BM: ***    Intake/Output Summary (Last 24 hours) at 2021 0718  Last data filed at 2021 0612  Gross per 24 hour   Intake 2417 ml   Output --   Net 2417 ml     I/O last 3 completed shifts: In: 2417 [P.O.:280; I.V.:2137]  Out: -     Safety Concerns: At Risk for Falls    Impairments/Disabilities:      None    Nutrition Therapy:  Current Nutrition Therapy:   - Oral Diet:  Carb Control 4 carbs/meal (1800kcals/day)    Routes of Feeding: Oral  Liquids: Clear  Daily Fluid Restriction: no  Last Modified Barium Swallow with Video (Video Swallowing Test): not done    Treatments at the Time of Hospital Discharge:   Respiratory Treatments: See [facility]  Oxygen Therapy:  is not on home oxygen therapy. Ventilator:    - No ventilator support    Rehab Therapies: Physical Therapy and Occupational Therapy  Weight Bearing Status/Restrictions: No weight bearing restirctions  Other Medical Equipment (for information only, NOT a DME order):  wheelchair  Other Treatments: ***    Patient's personal belongings (please select all that are sent with patient):  None    RN SIGNATURE:  Electronically signed by Jesse Rushing RN on 8/12/21 at 11:11 AM EDT    CASE MANAGEMENT/SOCIAL WORK SECTION    Inpatient Status Date: ***    Readmission Risk Assessment Score:  Readmission Risk              Risk of Unplanned Readmission:  27           Discharging to Facility/ Agency   · Name:   · Address:  · Phone:  · Fax:    Dialysis Facility (if applicable)   · Name:  · Address:  · Dialysis Schedule:  · Phone:  · Fax:    / signature: {Esignature:[id number]:::0}    PHYSICIAN SECTION    Prognosis: {Prognosis:5335793740:::0}    Condition at Discharge: Leticia Wheat Patient Condition:235886660:::0}    Rehab Potential (if transferring to Rehab): {Prognosis:8157817329:::0}    Recommended Labs or Other Treatments After Discharge: ***    Physician Certification: I certify the above information and transfer of Gissel Sam  is necessary for the continuing treatment of the diagnosis listed and that he requires {Admit to Appropriate Level of Care:85232:::0} for {GREATER/LESS:288060982} 30 days.      Update Admission H&P: {[facility] DME Changes in FET:363536991:::0}    PHYSICIAN SIGNATURE:  Lui Boogie MD

## 2021-08-12 NOTE — PROGRESS NOTES
solution, Take 2.5 mg by nebulization every 6 hours as needed for Wheezing  aspirin 325 MG tablet, Take 325 mg by mouth daily  clopidogrel (PLAVIX) 75 MG tablet, Take 75 mg by mouth nightly  glucagon, rDNA, 1 MG injection, Inject 1 mg into the muscle as needed for Low blood sugar  isosorbide mononitrate (IMDUR) 60 MG extended release tablet, Take 60 mg by mouth daily  ammonium lactate (AMLACTIN) 12 % cream, Apply topically every 12 hours as needed for Dry Skin (Apply to entire body)  Multiple Vitamins-Minerals (THERAPEUTIC MULTIVITAMIN-MINERALS) tablet, Take 1 tablet by mouth daily  HYDROcodone-acetaminophen (NORCO) 5-325 MG per tablet, Take 1 tablet by mouth every 6 hours as needed for Pain.   insulin aspart (NOVOLOG) 100 UNIT/ML injection vial, Inject 0-12 Units into the skin 3 times daily (before meals) *Per Sliding Scale*  dextromethorphan-quiNIDine (NUEDEXTA) 20-10 MG CAPS per capsule, Take 1 capsule by mouth 2 times daily  polyvinyl alcohol (LIQUIFILM TEARS) 1.4 % ophthalmic solution, Place 1 drop into both eyes every 8 hours as needed for Dry Eyes  Multiple Vitamins-Minerals (PRESERVISION AREDS 2) CAPS, Take 1 capsule by mouth 2 times daily  pantoprazole (PROTONIX) 40 MG tablet, Take 40 mg by mouth daily  rivastigmine (EXELON) 3 MG capsule, Take 3 mg by mouth 2 times daily  senna (SENOKOT) 8.6 MG tablet, Take 1 tablet by mouth daily  trihexyphenidyl (ARTANE) 2 MG tablet, Take 2 mg by mouth 2 times daily  amoxicillin-clavulanate (AUGMENTIN) 875-125 MG per tablet, Take 1 tablet by mouth 2 times daily For 5 days  erythromycin (ROMYCIN) 5 MG/GM ophthalmic ointment, Place into the right eye 2 times daily  albuterol-ipratropium (COMBIVENT RESPIMAT)  MCG/ACT AERS inhaler, Inhale 1 puff into the lungs every 4 hours as needed for Wheezing or Shortness of Breath  metFORMIN (GLUCOPHAGE) 1000 MG tablet, Take 1,000 mg by mouth 2 times daily (with meals)  levothyroxine (SYNTHROID) 175 MCG tablet, Take 175 mcg by mouth Daily   atorvastatin (LIPITOR) 20 MG tablet, Take 20 mg by mouth nightly   montelukast (SINGULAIR) 10 MG tablet, Take 1 tablet by mouth nightly. [DISCONTINUED] fluticasone-salmeterol (ADVAIR) 250-50 MCG/DOSE AEPB, Inhale 2 puffs into the lungs 2 times daily  [DISCONTINUED] amoxicillin-clavulanate (AUGMENTIN) 875-125 MG per tablet, Take 1 tablet by mouth 2 times daily  [DISCONTINUED] carboxymethylcellulose 1 % ophthalmic solution, Place 1 drop into both eyes 4 times daily  [DISCONTINUED] divalproex (DEPAKOTE) 500 MG DR tablet, Take 500 mg by mouth 3 times daily  [DISCONTINUED] erythromycin (ROMYCIN) 5 MG/GM ophthalmic ointment, Place 1 Dose into the right eye 2 times daily  [DISCONTINUED] medroxyPROGESTERone (PROVERA) 10 MG tablet, Take 30 mg by mouth 2 times daily  [DISCONTINUED] azithromycin (ZITHROMAX) 250 MG tablet, Take 250 mg by mouth daily Give for 4 days, evening  [DISCONTINUED] divalproex (DEPAKOTE) 500 MG DR tablet, Take 500 mg by mouth 3 times daily  [DISCONTINUED] furosemide (LASIX) 20 MG tablet, Take 1 tablet by mouth daily  budesonide-formoterol (SYMBICORT) 160-4.5 MCG/ACT AERO, Inhale 2 puffs into the lungs 2 times daily  acetaminophen 650 MG TABS, Take 650 mg by mouth every 4 hours as needed. Allergies:    Patient has no known allergies. Social History:    reports that he quit smoking about 8 months ago. His smoking use included cigarettes. He started smoking about 45 years ago. He has a 22.00 pack-year smoking history. He has never used smokeless tobacco. He reports that he does not drink alcohol and does not use drugs. Family History:   family history includes Cancer in his mother; Heart Disease in his father.     REVIEW OF SYSTEMS:  As above in the HPI, otherwise negative    PHYSICAL EXAM:    Vitals:  BP (!) 146/61   Pulse 75   Temp 97.9 °F (36.6 °C) (Oral)   Resp 20   Ht 5' 7\" (1.702 m)   Wt 223 lb (101.2 kg)   SpO2 95%   BMI 34.93 kg/m²     General:  Awake, alert, difficult to assess orientation due to his mental incapacity  . Well developed, well nourished, well groomed. No apparent distress. HEENT:  Normocephalic, atraumatic. Pupils equal, round, reactive to light. No scleral icterus. No conjunctival injection. Normal lips, teeth, and gums. No nasal discharge. Neck:  Supple  Heart:  RRR, no murmurs, gallops, rubs  Lungs:  CTA bilaterally, bilat symmetrical expansion, no wheeze, rales, or rhonchi  Abdomen: Bowel sounds present, soft, nontender, no masses, no organomegaly, no peritoneal signs  Extremities:  No clubbing, cyanosis, or edema  Skin:  Warm and dry, no open lesions or rash  Neuro:  Cranial nerves 2-12 intact, no focal deficits  Breast: deferred  Rectal: deferred  Genitalia:  deferred    LABS:  Data reviewed in detail      ASSESSMENT:      Active Problems:    JOSH (acute kidney injury) (HCC)/creatinine normalized with IV hydration   Mental status back at baseline   recent UTI on Augmentin  No source of infection identified at present  Multiple comorbidities.   Patient Active Problem List   Diagnosis    CAD (coronary artery disease)    Diabetes mellitus ([address]    Hyperlipidemia    DEMARCUS (obstructive sleep apnea)    COPD (chronic obstructive pulmonary disease) ([address]    ASHD (arteriosclerotic heart disease)    History of stroke    Bilateral carotid artery stenosis    NSTEMI (non-ST elevated myocardial infarction) ([address]    History of tobacco use    JOSH (acute kidney injury) ([address]             PLAN:  DC IV fluids  Discharge to Tl Melton MD  7:31 AM  8/12/2021

## 2021-08-12 NOTE — CARE COORDINATION
8/12/2021 - Discharge order on chart. Set up transportation to [place] [address] for 12:30 pm today with [facility]. Family notified of pt returning to [place] [address]. Discharge paperwork in envelope on soft chart. SW/Cm will follow.

## 2021-08-12 NOTE — DISCHARGE SUMMARY
Physician Discharge Summary     Patient ID:  Gissel Sam  [id number]  1 y.o.  1950    Admit date: 8/10/2021    Discharge date and time: 8/12/2021  1:30 PM     Admission Diagnoses: JOSH (acute kidney injury) [facility]) [N17.9]    Discharge Diagnoses:    Altered mental status from dehydration  Urinary tract infection  Acute kidney injury  Patient Active Problem List   Diagnosis    CAD (coronary artery disease)    Diabetes mellitus ([facility]    Hyperlipidemia    DEMARCUS (obstructive sleep apnea)    COPD (chronic obstructive pulmonary disease) ([facility]    ASHD (arteriosclerotic heart disease)    History of stroke    Bilateral carotid artery stenosis    NSTEMI (non-ST elevated myocardial infarction) ([facility]    History of tobacco use    JOSH (acute kidney injury) ([facility]       Consults    Procedures:     Hospital Course:   68-year-old  man with complex medical and psychiatric history  Currently resident at [address]  Recently started on Augmentin for presumed urinary tract infection  Apparently was found to have much more of confusion  Transferred to main [place] at [facility]  Creatinine was found to be elevated at 1.8 suggestive of acute kidney injury  Creatinine normalized with IV hydration after admission  Mental status back at baseline  Discharge back to facility    Discharge Exam:  See progress note from today    Disposition: Stable at the time of discharge  Discharge to [facility]  Patient Instructions:   Discharge Medication List as of 8/12/2021 11:25 AM      CONTINUE these medications which have CHANGED    Details   aspirin (ECOTRIN) 325 MG EC tablet Take 1 tablet by mouth daily Do not crush, Disp-30 tabletDC to [facility]      !! isosorbide mononitrate (IMDUR) 60 MG extended release tablet Take 1 tablet by mouth daily, Disp-30 tablet, R-3DC to SNF      divalproex (DEPAKOTE) 500 MG DR tablet Take 1 tablet by mouth 3 times daily, Disp-90 tablet, R-3DC to SNF      insulin aspart (NOVOLOG FLEXPEN) 100 UNIT/ML injection pen Inject 0-12 Units into the skin 3 times daily (before meals) 0-140= 0 units  141-180= 2 units  181-220= 4 units  221-260= 6 units  261-300= 8 units  301-340= 10 units  341+ = 12 units and notify md, [facility] penD to [facility]       ! ! - Potential duplicate medications found. Please discuss with provider. CONTINUE these medications which have NOT CHANGED    Details   albuterol (PROVENTIL) 2.5 MG/0.5ML NEBU nebulizer solution Take 2.5 mg by nebulization every 6 hours as needed for WheezingHistorical Med      aspirin 325 MG tablet Take 325 mg by mouth dailyHistorical Med      clopidogrel (PLAVIX) 75 MG tablet Take 75 mg by mouth nightlyHistorical Med      glucagon, rDNA, 1 MG injection Inject 1 mg into the muscle as needed for Low blood sugarHistorical Med      !! isosorbide mononitrate (IMDUR) 60 MG extended release tablet Take 60 mg by mouth dailyHistorical Med      ammonium lactate (AMLACTIN) 12 % cream Apply topically every 12 hours as needed for Dry Skin (Apply to entire body), Topical, EVERY 12 HOURS PRN, Historical Med      Multiple Vitamins-Minerals (THERAPEUTIC MULTIVITAMIN-MINERALS) tablet Take 1 tablet by mouth dailyHistorical Med      HYDROcodone-acetaminophen (NORCO) 5-325 MG per tablet Take 1 tablet by mouth every 6 hours as needed for Pain. Historical Med      insulin aspart (NOVOLOG) 100 UNIT/ML injection vial Inject 0-12 Units into the skin 3 times daily (before meals) *Per Sliding Scale*Historical Med      dextromethorphan-quiNIDine (NUEDEXTA) 20-10 MG CAPS per capsule Take 1 capsule by mouth 2 times dailyHistorical Med      polyvinyl alcohol (LIQUIFILM TEARS) 1.4 % ophthalmic solution Place 1 drop into both eyes every 8 hours as needed for Dry EyesHistorical Med      Multiple Vitamins-Minerals (PRESERVISION AREDS 2) CAPS Take 1 capsule by mouth 2 times dailyHistorical Med      pantoprazole (PROTONIX) 40 MG tablet Take 40 mg by mouth dailyHistorical Med      rivastigmine (EXELON) 3 MG Reason for Stopping:         glucagon, rDNA, 1 MG SOLR injection Comments:   Reason for Stopping:         glucose (GLUTOSE) 40 % GEL Comments:   Reason for Stopping:         magnesium hydroxide (MILK OF MAGNESIA) 400 MG/5ML suspension Comments:   Reason for Stopping:         montelukast (SINGULAIR) 10 MG tablet Comments:   Reason for Stopping:             Activity: As tolerated  Diet: As tolerated    Follow-up with PCP    Note that over 40 minutes was spent in preparing discharge papers, discussing discharge with patient, medication review, etc.    Signed:  Stefanie Boo MD  8/12/2021  3:29 PM

## 2021-08-21 NOTE — PROGRESS NOTES
Physician Progress Note      Savannah Landrum  [facility] #:                  [id number]  :                       1950  ADMIT DATE:       8/10/2021 10:34 AM  DISCH DATE:        2021 1:30 PM  RESPONDING  PROVIDER #:        Ingrid Vazquez MD          QUERY TEXT:    Pt admitted with JOSH. Pt noted to have altered mental status and . If   possible, please document in the progress notes and discharge summary if you   are evaluating and / or treating any of the following: The medical record reflects the following:  Risk Factors: dehydration, UTI prior to admission  Clinical Indicators: Elevated creat of 1.8, GFR 37, and Dx of JOSH, brought to   ER due to worsening AMS. Pt returned to baseline mental status per PN dated   21 by Internal Medicine. Discharge summary states \"AMS from dehydration. \"   Upon discharge, labs as follows: Creat 1.2, GFR 59, which were greatly   improved. Treatment: 2L NS bolus IV, NS continuous IV infusion @ 100mL/hr. Thank you,  Ender Diaz, RN  [phone]  Options provided:  -- Metabolic encephalopathy due to dehydration  -- Delirium due to, Please specify cause. -- Delirium  -- Other - I will add my own diagnosis  -- Disagree - Not applicable / Not valid  -- Disagree - Clinically unable to determine / Unknown  -- Refer to Clinical Documentation Reviewer    PROVIDER RESPONSE TEXT:    Patient has delirium with unknown cause.     Query created by: Alver Scheuermann on 2021 2:25 PM      Electronically signed by:  Ingrid Vazquez MD 2021 6:52 AM

## 2021-08-25 ENCOUNTER — APPOINTMENT (OUTPATIENT)
Dept: CT IMAGING | Age: 71
End: 2021-08-25
Payer: MEDICARE

## 2021-08-25 ENCOUNTER — HOSPITAL ENCOUNTER (EMERGENCY)
Age: 71
Discharge: NURSING, REHAB OR LONG-TERM CARE | End: 2021-08-26
Attending: EMERGENCY MEDICINE
Payer: MEDICARE

## 2021-08-25 ENCOUNTER — APPOINTMENT (OUTPATIENT)
Dept: GENERAL RADIOLOGY | Age: 71
End: 2021-08-25
Payer: MEDICARE

## 2021-08-25 VITALS
SYSTOLIC BLOOD PRESSURE: 135 MMHG | RESPIRATION RATE: 16 BRPM | OXYGEN SATURATION: 97 % | HEART RATE: 70 BPM | DIASTOLIC BLOOD PRESSURE: 62 MMHG | TEMPERATURE: 97.5 F

## 2021-08-25 DIAGNOSIS — R29.6 UNWITNESSED FALL: Primary | ICD-10-CM

## 2021-08-25 LAB
ALBUMIN SERPL-MCNC: 3.2 G/DL (ref 3.5–5.2)
ALP BLD-CCNC: 89 U/L (ref 40–129)
ALT SERPL-CCNC: 18 U/L (ref 0–40)
ANION GAP SERPL CALCULATED.3IONS-SCNC: 7 MMOL/L (ref 7–16)
AST SERPL-CCNC: 21 U/L (ref 0–39)
BASOPHILS ABSOLUTE: 0.08 E9/L (ref 0–0.2)
BASOPHILS RELATIVE PERCENT: 0.6 % (ref 0–2)
BILIRUB SERPL-MCNC: 0.5 MG/DL (ref 0–1.2)
BUN BLDV-MCNC: 28 MG/DL (ref 6–23)
CALCIUM SERPL-MCNC: 8.7 MG/DL (ref 8.6–10.2)
CHLORIDE BLD-SCNC: 106 MMOL/L (ref 98–107)
CO2: 26 MMOL/L (ref 22–29)
CREAT SERPL-MCNC: 1.4 MG/DL (ref 0.7–1.2)
EOSINOPHILS ABSOLUTE: 0.07 E9/L (ref 0.05–0.5)
EOSINOPHILS RELATIVE PERCENT: 0.6 % (ref 0–6)
GFR AFRICAN AMERICAN: >60
GFR NON-AFRICAN AMERICAN: 50 ML/MIN/1.73
GLUCOSE BLD-MCNC: 112 MG/DL (ref 74–99)
HCT VFR BLD CALC: 38.4 % (ref 37–54)
HEMOGLOBIN: 12.1 G/DL (ref 12.5–16.5)
IMMATURE GRANULOCYTES #: 0.05 E9/L
IMMATURE GRANULOCYTES %: 0.4 % (ref 0–5)
LYMPHOCYTES ABSOLUTE: 2.49 E9/L (ref 1.5–4)
LYMPHOCYTES RELATIVE PERCENT: 20.1 % (ref 20–42)
MCH RBC QN AUTO: 27.6 PG (ref 26–35)
MCHC RBC AUTO-ENTMCNC: 31.5 % (ref 32–34.5)
MCV RBC AUTO: 87.5 FL (ref 80–99.9)
MONOCYTES ABSOLUTE: 1.09 E9/L (ref 0.1–0.95)
MONOCYTES RELATIVE PERCENT: 8.8 % (ref 2–12)
NEUTROPHILS ABSOLUTE: 8.58 E9/L (ref 1.8–7.3)
NEUTROPHILS RELATIVE PERCENT: 69.5 % (ref 43–80)
PDW BLD-RTO: 17.7 FL (ref 11.5–15)
PLATELET # BLD: 241 E9/L (ref 130–450)
PMV BLD AUTO: 9.9 FL (ref 7–12)
POTASSIUM REFLEX MAGNESIUM: 4.9 MMOL/L (ref 3.5–5)
RBC # BLD: 4.39 E12/L (ref 3.8–5.8)
SODIUM BLD-SCNC: 139 MMOL/L (ref 132–146)
TOTAL CK: 41 U/L (ref 20–200)
TOTAL PROTEIN: 5.9 G/DL (ref 6.4–8.3)
WBC # BLD: 12.4 E9/L (ref 4.5–11.5)

## 2021-08-25 PROCEDURE — 93005 ELECTROCARDIOGRAM TRACING: CPT | Performed by: EMERGENCY MEDICINE

## 2021-08-25 PROCEDURE — 72170 X-RAY EXAM OF PELVIS: CPT

## 2021-08-25 PROCEDURE — 70450 CT HEAD/BRAIN W/O DYE: CPT

## 2021-08-25 PROCEDURE — 71045 X-RAY EXAM CHEST 1 VIEW: CPT

## 2021-08-25 PROCEDURE — 82550 ASSAY OF CK (CPK): CPT

## 2021-08-25 PROCEDURE — 85025 COMPLETE CBC W/AUTO DIFF WBC: CPT

## 2021-08-25 PROCEDURE — 72125 CT NECK SPINE W/O DYE: CPT

## 2021-08-25 PROCEDURE — 80053 COMPREHEN METABOLIC PANEL: CPT

## 2021-08-25 PROCEDURE — 99284 EMERGENCY DEPT VISIT MOD MDM: CPT

## 2021-08-25 ASSESSMENT — PAIN DESCRIPTION - PAIN TYPE: TYPE: ACUTE PAIN

## 2021-08-25 ASSESSMENT — PAIN DESCRIPTION - LOCATION: LOCATION: HIP

## 2021-08-25 ASSESSMENT — PAIN DESCRIPTION - ORIENTATION: ORIENTATION: LEFT

## 2021-08-25 ASSESSMENT — PAIN SCALES - GENERAL: PAINLEVEL_OUTOF10: 8

## 2021-08-26 LAB
EKG ATRIAL RATE: 80 BPM
EKG P AXIS: 24 DEGREES
EKG P-R INTERVAL: 186 MS
EKG Q-T INTERVAL: 410 MS
EKG QRS DURATION: 128 MS
EKG QTC CALCULATION (BAZETT): 472 MS
EKG R AXIS: -16 DEGREES
EKG T AXIS: 10 DEGREES
EKG VENTRICULAR RATE: 80 BPM

## 2021-08-26 NOTE — ED PROVIDER NOTES
HPI:  8/25/21, Time: 10:05 PM EDT         Gissel Sam is a 66 y.o. male presenting to the ED for unwitnessed fall at the nursing home. Patient is normally not oriented to place and time, and he is at his neurologic baseline per EMS. Due to baseline confusion, I was unable to obtain complete history. History was obtained from nurse who spoke with nursing home and EMS. Patient had an unwitnessed fall at [place] [address], and he was found on the bathroom floor. Unknown downtown. Patient is on aspirin and plavix. Patient tells me that he tripped and fell in the bathroom, but he is unable to provide further details. He has no complaints. Patient presents with [facility] paperwork. Review of Systems:   Unable to obtain complete ROS due to confusion.          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 Past Surgical History:  has a past surgical history that includes Cardiac surgery; Coronary angioplasty with stent (12/2011); knee surgery; other surgical history (Left, 2/11/2015); other surgical history (3/2/15); other surgical history (Left, 3/2/2015); and eye surgery (Right, 8-). Social History:  reports that he quit smoking about 8 months ago. His smoking use included cigarettes. He started smoking about 45 years ago. He has a 22.00 pack-year smoking history. He has never used smokeless tobacco. He reports that he does not drink alcohol and does not use drugs. Family History: family history includes Cancer in his mother; Heart Disease in his father.      The patients home medications have been reviewed. Allergies: Patient has no known allergies.     -------------------------------------------------- RESULTS -------------------------------------------------  All laboratory and radiology results have been personally reviewed by myself   LABS:  Results for orders placed or performed during the hospital encounter of 08/25/21   CBC Auto Differential   Result Value Ref Range    WBC 12.4 (H) 4.5 - 11.5 E9/L    RBC 4.39 3.80 - 5.80 E12/L    Hemoglobin 12.1 (L) 12.5 - 16.5 g/dL    Hematocrit 38.4 37.0 - 54.0 %    MCV 87.5 80.0 - 99.9 fL    MCH 27.6 26.0 - 35.0 pg    MCHC 31.5 (L) 32.0 - 34.5 %    RDW 17.7 (H) 11.5 - 15.0 fL    Platelets 144 762 - 639 E9/L    MPV 9.9 7.0 - 12.0 fL    Neutrophils % 69.5 43.0 - 80.0 %    Immature Granulocytes % 0.4 0.0 - 5.0 %    Lymphocytes % 20.1 20.0 - 42.0 %    Monocytes % 8.8 2.0 - 12.0 %    Eosinophils % 0.6 0.0 - 6.0 %    Basophils % 0.6 0.0 - 2.0 %    Neutrophils Absolute 8.58 (H) 1.80 - 7.30 E9/L    Immature Granulocytes # 0.05 E9/L    Lymphocytes Absolute 2.49 1.50 - 4.00 E9/L    Monocytes Absolute 1.09 (H) 0.10 - 0.95 E9/L    Eosinophils Absolute 0.07 0.05 - 0.50 E9/L    Basophils Absolute 0.08 0.00 - 0.20 E9/L   Comprehensive Metabolic Panel w/ Reflex to MG   Result Value Ref Range    Sodium 139 132 - 146 mmol/L    Potassium reflex Magnesium 4.9 3.5 - 5.0 mmol/L    Chloride 106 98 - 107 mmol/L    CO2 26 22 - 29 mmol/L    Anion Gap 7 7 - 16 mmol/L    Glucose 112 (H) 74 - 99 mg/dL    BUN 28 (H) 6 - 23 mg/dL    CREATININE 1.4 (H) 0.7 - 1.2 mg/dL    GFR Non-African American 50 >=60 mL/min/1.73    GFR African American >60     Calcium 8.7 8.6 - 10.2 mg/dL    Total Protein 5.9 (L) 6.4 - 8.3 g/dL    Albumin 3.2 (L) 3.5 - 5.2 g/dL    Total Bilirubin 0.5 0.0 - 1.2 mg/dL    Alkaline Phosphatase 89 40 - 129 U/L    ALT 18 0 - 40 U/L    AST 21 0 - 39 U/L   CK   Result Value Ref Range    Total CK 41 20 - 200 U/L   EKG 12 Lead   Result Value Ref Range    Ventricular Rate 80 BPM Atrial Rate 80 BPM    P-R Interval 186 ms    QRS Duration 128 ms    Q-T Interval 410 ms    QTc Calculation (Bazett) 472 ms    P Axis 24 degrees    R Axis -16 degrees    T Axis 10 degrees       RADIOLOGY:  Interpreted by Radiologist.  CT Head WO Contrast   Final Result   No acute intracranial abnormality. Stable old infarct, left occipital temporal region. CT Cervical Spine WO Contrast   Final Result   No acute abnormality of the cervical spine. Multilevel degenerative changes with grade 1 anterolisthesis C3 over C4. XR CHEST PORTABLE   Final Result   No acute process. XR PELVIS (1-2 VIEWS)   Final Result   No evidence of acute trauma. ------------------------- NURSING NOTES AND VITALS REVIEWED ---------------------------   The nursing notes within the ED encounter and vital signs as below have been reviewed. /62   Pulse 70   Temp 97.5 °F (36.4 °C) (Oral)   Resp 16   SpO2 97%   Oxygen Saturation Interpretation: Normal      ---------------------------------------------------PHYSICAL EXAM--------------------------------------    PHYSICAL EXAM:  Vitals Reviewed  Constitutional/General: Alert and oriented x1, well appearing, non toxic in NAD. HEENT: Normocephalic and atraumatic. PERRL, EOMI. Oropharynx clear, handling secretions, no trismus. No raccoon eyes. No ortega's sign. Neck: Supple, full ROM, no cervical spine tenderness. Pulmonary: Lungs clear to auscultation bilaterally, no wheezes, rales, or rhonchi. Not in respiratory distress. Cardiovascular:  Regular rate and rhythm, no murmurs, gallops, or rubs. 2+ distal pulses. Chest: No chest wall tenderness. No crepitus. Abdomen: Soft, non tender, non distended,   Extremities: Moves all extremities x 4. Warm and well perfused. Back: No midline thoracic or lumbar tenderness. Skin: warm and dry without rash. Neurologic: Oriented x1, at neurologic baseline, 5/5 strength in all extremities.  Normal sensation in all extremities. Psych: Normal Affect.      ------------------------------ ED COURSE/MEDICAL DECISION MAKING----------------------  Medications - No data to display    Medical Decision Making/ED COURSE:   Patient is a 78-year-old male presenting after unwitnessed fall at nursing home. In the ED, patient was hemodynamically stable and afebrile. On exam, patient was awake and oriented x1 only with no focal deficits, at neurologic baseline. Labs and imaging obtained. I reviewed and interpreted labs. No acute findings. No traumatic findings on imaging. Patient appropriate for discharge back to nursing home. Patient remained hemodynamically stable throughout ED course. ED Course as of Aug 26 0127   Wed Aug 25, 2021   5319 EKG: This EKG is signed and interpreted by me. Rate: 80  Rhythm: Sinus  Interpretation: Normal sinus rhythm, right bundle branch block (seen on previous EKG), normal AL interval, normal QTC, no acute ST or T wave changes  Comparison: changes compared to previous EKG-A. fib seen on prior EKG      [JA]      ED Course User Index  [JA] Cliff Reyes MD       Counseling: The emergency provider has spoken with the nursing home and discussed todays results, in addition to providing specific details for the plan of care and counseling regarding the diagnosis and prognosis. Questions are answered at this time and they are agreeable with the plan.      --------------------------------- IMPRESSION AND DISPOSITION ---------------------------------    IMPRESSION  1. Unwitnessed fall        DISPOSITION  Disposition: Discharge to nursing home  Patient condition is stable      NOTE: This report was transcribed using voice recognition software.  Every effort was made to ensure accuracy; however, inadvertent computerized transcription errors may be present    ICliff MD, am the primary provider of this record       Cliff Reyes MD  08/26/21 0127

## 2021-08-26 NOTE — ED NOTES
Called and spoke with Jennifer William at [facility] and she stated that he always has the lump on the right side of his head. She states that he had an unwitnessed fall this evening around 1840. Jennifer William states that there was some redness noted to the back of his head. Pt denies any complaints of head pain at this time. Pt usually walks with a walker or uses a WC while at the facility. Pt is complaining of generalized left sided pain at this time. All information was provided from Jennifer William the nurse taking care of the pt at [facility].      Zena Carmona RN  08/25/21 3430

## 2021-08-26 NOTE — ED NOTES
Bed: 26  Expected date:   Expected time:   Means of arrival:   Comments:  hugo Cooley RN  08/25/21 2017

## 2021-09-09 ENCOUNTER — HOSPITAL ENCOUNTER (EMERGENCY)
Age: 71
Discharge: NURSING, REHAB OR LONG-TERM CARE | End: 2021-09-09
Attending: EMERGENCY MEDICINE
Payer: MEDICARE

## 2021-09-09 ENCOUNTER — APPOINTMENT (OUTPATIENT)
Dept: GENERAL RADIOLOGY | Age: 71
End: 2021-09-09
Payer: MEDICARE

## 2021-09-09 ENCOUNTER — APPOINTMENT (OUTPATIENT)
Dept: CT IMAGING | Age: 71
End: 2021-09-09
Payer: MEDICARE

## 2021-09-09 VITALS
HEART RATE: 74 BPM | RESPIRATION RATE: 16 BRPM | DIASTOLIC BLOOD PRESSURE: 62 MMHG | BODY MASS INDEX: 31.64 KG/M2 | TEMPERATURE: 98.4 F | WEIGHT: 221 LBS | OXYGEN SATURATION: 96 % | HEIGHT: 70 IN | SYSTOLIC BLOOD PRESSURE: 126 MMHG

## 2021-09-09 DIAGNOSIS — S51.012A SKIN TEAR OF LEFT ELBOW WITHOUT COMPLICATION, INITIAL ENCOUNTER: Primary | ICD-10-CM
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
## 2021-09-09 PROCEDURE — 72125 CT NECK SPINE W/O DYE: CPT

## 2021-09-09 PROCEDURE — 73070 X-RAY EXAM OF ELBOW: CPT

## 2021-09-09 PROCEDURE — 99283 EMERGENCY DEPT VISIT LOW MDM: CPT

## 2021-09-09 PROCEDURE — 73521 X-RAY EXAM HIPS BI 2 VIEWS: CPT

## 2021-09-09 PROCEDURE — 70450 CT HEAD/BRAIN W/O DYE: CPT

## 2021-09-09 NOTE — ED NOTES
Bed: 09  Expected date:   Expected time:   Means of arrival:   Comments:  EMS     Son Early RN  09/09/21 8230

## 2021-09-09 NOTE — ED PROVIDER NOTES
Name: Cirilo Blount   MRN: [id number]     --------------------------------------------- History of Present Illness ---------------------------------------------  9/9/21, Time: 7:46 PM EDT   No chief complaint on file. HPI    Cirilo Blount is a 66 y.o. male, with hx of baseline confusion and is on ASA and plavix, who presents to the ED today after a fall [address] Patient states he slipped in the states he is not having any pain anywhere except the left elbow. The patient is a poor historian as is baseline is not very verbal and somewhat confused. Allg: Patient has no known allergies. PCP: Divine Pride DO. Pt is DNR-CCA. Meds: No current facility-administered medications for this encounter.     Current Outpatient Medications:     aspirin (ECOTRIN) 325 MG EC tablet, Take 1 tablet by mouth daily Do not crush, Disp: 30 tablet, Rfl:     isosorbide mononitrate (IMDUR) 60 MG extended release tablet, Take 1 tablet by mouth daily, Disp: 30 tablet, Rfl: 3    divalproex (DEPAKOTE) 500 MG DR tablet, Take 1 tablet by mouth 3 times daily, Disp: 90 tablet, Rfl: 3    insulin aspart (NOVOLOG FLEXPEN) 100 UNIT/ML injection pen, Inject 0-12 Units into the skin 3 times daily (before meals) 0-140= 0 units 141-180= 2 units 181-220= 4 units 221-260= 6 units 261-300= 8 units 301-340= 10 units 341+ = 12 units and notify md, Disp: 5 pen, Rfl:     aspirin 325 MG tablet, Take 325 mg by mouth daily, Disp: , Rfl:     clopidogrel (PLAVIX) 75 MG tablet, Take 75 mg by mouth nightly, Disp: , Rfl:     glucagon, rDNA, 1 MG injection, Inject 1 mg into the muscle as needed for Low blood sugar, Disp: , Rfl:     isosorbide mononitrate (IMDUR) 60 MG extended release tablet, Take 60 mg by mouth daily, Disp: , Rfl:     ammonium lactate (AMLACTIN) 12 % cream, Apply topically every 12 hours as needed for Dry Skin (Apply to entire body), Disp: , Rfl:     Multiple Vitamins-Minerals (THERAPEUTIC MULTIVITAMIN-MINERALS) tablet, Take 1 tablet by mouth daily, Disp: , Rfl:     insulin aspart (NOVOLOG) 100 UNIT/ML injection vial, Inject 0-12 Units into the skin 3 times daily (before meals) *Per Sliding Scale*, Disp: , Rfl:     dextromethorphan-quiNIDine (NUEDEXTA) 20-10 MG CAPS per capsule, Take 1 capsule by mouth 2 times daily, Disp: , Rfl:     polyvinyl alcohol (LIQUIFILM TEARS) 1.4 % ophthalmic solution, Place 1 drop into both eyes every 8 hours as needed for Dry Eyes, Disp: , Rfl:     Multiple Vitamins-Minerals (PRESERVISION AREDS 2) CAPS, Take 1 capsule by mouth 2 times daily, Disp: , Rfl:     pantoprazole (PROTONIX) 40 MG tablet, Take 40 mg by mouth daily, Disp: , Rfl:     rivastigmine (EXELON) 3 MG capsule, Take 3 mg by mouth 2 times daily, Disp: , Rfl:     senna (SENOKOT) 8.6 MG tablet, Take 1 tablet by mouth daily, Disp: , Rfl:     trihexyphenidyl (ARTANE) 2 MG tablet, Take 2 mg by mouth 2 times daily, Disp: , Rfl:     amoxicillin-clavulanate (AUGMENTIN) 875-125 MG per tablet, Take 1 tablet by mouth 2 times daily For 5 days, Disp: , Rfl:     erythromycin (ROMYCIN) 5 MG/GM ophthalmic ointment, Place into the right eye 2 times daily, Disp: , Rfl:     budesonide-formoterol (SYMBICORT) 160-4.5 MCG/ACT AERO, Inhale 2 puffs into the lungs 2 times daily, Disp: 1 Inhaler, Rfl: 3    albuterol-ipratropium (COMBIVENT RESPIMAT)  MCG/ACT AERS inhaler, Inhale 1 puff into the lungs every 4 hours as needed for Wheezing or Shortness of Breath, Disp:  , Rfl:     metFORMIN (GLUCOPHAGE) 1000 MG tablet, Take 1,000 mg by mouth 2 times daily (with meals), Disp: , Rfl:     levothyroxine (SYNTHROID) 175 MCG tablet, Take 175 mcg by mouth Daily , Disp: , Rfl:     acetaminophen 650 MG TABS, Take 650 mg by mouth every 4 hours as needed. , Disp: 120 tablet, Rfl: 3    atorvastatin (LIPITOR) 20 MG tablet, Take 20 mg by mouth nightly , Disp: , Rfl:      Review of Systems   Unable to perform ROS: Patient nonverbal        Physical Exam  Vitals and unwitnessed fall. Patient is alert and able to answer some questions however his baseline seems to be confused and somewhat nonverbal.  Patient states that he slipped. He is complaining of some left elbow pain as well as some left hip pain. Patient denies losing consciousness hitting his head. On physical exam patient is conscious and alert however not oriented. On exam, no outward signs of trauma to the head or neck. No neck pain with midline palpation. The left elbow showed a minor skin tear bleeding controlled with Band-Aid. There appears to be some old ecchymoses on the right hip as well as the left flank and lower back area. Heart and lung exam was unremarkable. No abdominal tenderness was elicited. The remainder of the physical exam was negative. Noncontrast CT of the head and neck as well as x-rays of the left elbow, pelvis, and left hip were ordered. CT head and neck showed no acute pathological findings. X-rays of the hip, pelvis, and left elbow showed no acute pathological findings. At this time no further work-up is necessary. Only injury appreciated was minor skin tear to left elbow which was bandaged with bandaid. Patient was discharged back to the nursing care facility.                  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     Past Surgical History:  has a past surgical history that includes Cardiac surgery; Coronary angioplasty with stent (12/2011); knee surgery; other surgical history (Left, 2/11/2015); other surgical history (3/2/15); other surgical history (Left, 3/2/2015); and eye surgery (Right, 8-). Social History:  reports that he quit smoking about 9 months ago. His smoking use included cigarettes. He started smoking about 45 years ago. He has a 22.00 pack-year smoking history. He has never used smokeless tobacco. He reports that he does not drink alcohol and does not use drugs. Family History: family history includes Cancer in his mother; Heart Disease in his father. The patients home medications have been reviewed. Allergies: Patient has no known allergies. -------------------------------------------------- RESULTS -------------------------------------------------  Labs:  No results found for this visit on 09/09/21. Radiology:  XR HIP BILATERAL W AP PELVIS (2 VIEWS)   Final Result   No acute abnormality of the hip. XR ELBOW LEFT (2 VIEWS)   Final Result   No evidence of fracture or dislocation of the left elbow. CT Head WO Contrast   Final Result   No acute intracranial abnormality. Stable old infarct, left occipital temporal region. CT CERVICAL SPINE WO CONTRAST   Final Result   No acute abnormality of the cervical spine. Multilevel degenerative changes, as noted above.             ------------------------- NURSING NOTES AND VITALS REVIEWED ---------------------------  Date / Time Roomed:  9/9/2021  6:57 PM  ED Bed Assignment:  09/09    The nursing notes within the ED encounter and vital signs as below have been reviewed. /62   Pulse 74   Temp 98.4 °F (36.9 °C) (Oral)   Resp 16   Ht 5' 10\" (1.778 m)   Wt 221 lb (100.2 kg)   SpO2 96%   BMI 31.71 kg/m²   Oxygen Saturation Interpretation: Normal      ------------------------------------------ PROGRESS NOTES ------------------------------------------  I have spoken with the patient and discussed todays results, in addition to providing specific details for the plan of care and counseling regarding the diagnosis and prognosis.   Their questions are answered at this time and they are agreeable with the plan. I discussed at length with them reasons for immediate return here for re evaluation. They will followup with their primary care physician by calling their office tomorrow. --------------------------------- ADDITIONAL PROVIDER NOTES ---------------------------------  At this time the patient is without objective evidence of an acute process requiring hospitalization or inpatient management. They have remained hemodynamically stable throughout their entire ED visit and are stable for discharge with outpatient follow-up. The plan has been discussed in detail and they are aware of the specific conditions for emergent return, as well as the importance of follow-up. Discharge Medication List as of 9/9/2021 10:51 PM          Diagnosis:  1. Skin tear of left elbow without complication, initial encounter    2. Fall from wheelchair, initial encounter        Disposition:  Patient's disposition: Discharge to nursing home  Patient's condition is stable.          Jorge Mott DO  09/10/21 1204

## 2021-09-10 NOTE — ED NOTES
Report called to [facility] facility. [place] took report.       Ann Meigs, RN  09/09/21 8276

## 2021-09-15 ENCOUNTER — CARE COORDINATION (OUTPATIENT)
Dept: CARE COORDINATION | Age: 71
End: 2021-09-15

## 2021-09-15 NOTE — CARE COORDINATION
[facility] Transitions BPCI-A Follow Up Call  · Patient transferred from SNF to  LTC at [facility] on 8/12/21      Patient Name:  Yulissa Crespo   YOB: 1950  Discharge Date:  9/9/21  RARS:  Readmission Risk Score: 27    PCP:  Lorie Runner, DO      Challenges to be reviewed by the provider              Method of communication with provider :   Anticipated D/C Date:   Ongoing symptoms or worsening condition:   Appetite:   Sleep pattern:   Cognitive function:   Support System:   Medication Needs/Questions:   Transportation Need:     Recent loss of balance, Falls:      [address], spoke to Joseph. She placed me on hold for over 6 mins. Care Transitions will continue to follow per [facility] [address] for next call:      Future Appointments   Date Time Provider [place]   12/2/2021 10:45 AM [facility] VAS [facility] [place]   12/2/2021 11:00 AM Tacho Small MD VAS/[facility]

## 2021-09-27 ENCOUNTER — CARE COORDINATION (OUTPATIENT)
Dept: CARE COORDINATION | Age: 71
End: 2021-09-27

## 2021-09-27 NOTE — CARE COORDINATION
[address] [facility] Transitions BPCI-A Follow Up Call  Patient transferred from [facility] to [facility] at [facility] on 8/12/21    Patient Name:  Cirilo Blount   YOB: 1950  Discharge Date:  9/9/21  RARS:  Readmission Risk Score: 27    PCP:  Divine Pride DO      Challenges to be reviewed by the provider              Method of communication with provider :      Anticipated D/C Date:   Ongoing symptoms or worsening condition:   Appetite:   Sleep pattern:   Cognitive function:   Support System:   Medication Needs/Questions:   Transportation Need:     Recent loss of balance, Falls:    [facility], spoke to Reji Lester. She states patient is there. She transferred me to nurses station. Phone rang for several minutes and no one answered. Care Transitions will continue to follow per [address] for next call:      Future Appointments   Date Time Provider Phyllis [place]   12/2/2021 10:45 AM [facility] VAS [facility] [place]   12/2/2021 11:00 AM Sueanne Goodpasture, MD VAS/MED [facility]

## 2021-10-05 ENCOUNTER — CARE COORDINATION (OUTPATIENT)
Dept: CARE COORDINATION | Age: 71
End: 2021-10-05

## 2021-10-05 NOTE — CARE COORDINATION
[address] [facility] Transitions BPCI-A Follow Up Call  Patient transferred from SNF to LTC at [facility] on 8/12/21    Patient Name:  Martin Vela   YOB: 1950  Discharge Date:  9/9/21  RARS:  Readmission Risk Score: 27    PCP:  Allan Finch DO      Challenges to be reviewed by the provider              Method of communication with provider :      Anticipated D/C Date:   Ongoing symptoms or worsening condition:   Appetite:   Sleep pattern:   Cognitive function:   Support System:   Medication Needs/Questions:   Transportation Need:     Recent loss of balance, Falls:    [address], spoke to Josh Giraldo. She states patient is still there and transferred me to [facility]. After 5 minutes nurse never answered. Care Transitions will continue to follow per [address] for next call:      Future Appointments   Date Time Provider Phyllis Bay   12/2/2021 10:45 AM [facility] VAS [facility] [place]   12/2/2021 11:00 AM MD WILLIAMS Jiménez/MED [facility]

## 2021-10-12 ENCOUNTER — CARE COORDINATION (OUTPATIENT)
Dept: CARE COORDINATION | Age: 71
End: 2021-10-12

## 2021-10-12 NOTE — CARE COORDINATION
[address] [facility] Transitions BPCI-A Follow Up Call  Patient transferred from SNF to LTC at [facility] on 8/12/  Patient Name:  Juliocesar Devine   YOB: 1950  Discharge Date:  9/9/21  RARS:  Readmission Risk Score: 27    PCP:  Tarik Ortega DO      Challenges to be reviewed by the provider              Method of communication with provider :      Anticipated D/C Date: LTC   Ongoing symptoms or worsening condition:   Appetite:   Sleep pattern:   Cognitive function:   Support System:   Medication Needs/Questions:   Transportation Need:     Recent loss of balance, Falls:    [address], spoke to [facility]. She states patient is still. She transferred me to nursing station but no one answered. Care Transitions will continue to follow per [facility] [address] for next call:      Future Appointments   Date Time Provider Phyllis Bay   12/2/2021 10:45 AM [facility] [place]   12/2/2021 11:00 AM Mel Uriostegui MD [facility]

## 2021-10-22 ENCOUNTER — CARE COORDINATION (OUTPATIENT)
Dept: CARE COORDINATION | Age: 71
End: 2021-10-22

## 2021-11-02 ENCOUNTER — CARE COORDINATION (OUTPATIENT)
Dept: CARE COORDINATION | Age: 71
End: 2021-11-02

## 2021-11-08 ENCOUNTER — CARE COORDINATION (OUTPATIENT)
Dept: INTERNAL MEDICINE | Age: 71
End: 2021-11-08

## 2021-11-08 NOTE — CARE COORDINATION
[address] [facility] Transitions BPCI-A Follow Up Call  Patient transferred from SNF to LTC at [facility] on 8/12/21    Patient Name:  Henry Wooten   YOB: 1950  Discharge Date:  9/9/21  RARS:  Readmission Risk Score: 27    PCP:  Torres Bosch DO      Challenges to be reviewed by the provider              Method of communication with provider :      Anticipated D/C Date:LTC    Ongoing symptoms or worsening condition:   Appetite:   Sleep pattern:   Cognitive function:   Support System:   Medication Needs/Questions:   Transportation Need:     Recent loss of balance, Falls:    Contacted [facility], spoke to [employer] [facility]. She states patient is still there and transferred me to the nurse. I was on hold for almost 5 minutes and no one answered. Care Transitions will continue to follow per [facility] [address] for next call:      Future Appointments   Date Time Provider Phyllis Bay   12/2/2021 10:45 AM [facility] VAS [facility] [place]   12/2/2021 11:00 AM Barrett Younger MD [facility]/MED [facility]

## 2021-11-12 ENCOUNTER — TELEPHONE (OUTPATIENT)
Dept: VASCULAR SURGERY | Age: 71
End: 2021-11-12

## 2021-11-12 NOTE — TELEPHONE ENCOUNTER
Spoke to [place] at [facility] regarding his carotid testing for 12-2-2021 with Dr Leon Crespo, as we have closed our lab. She said he was just put on hospice and she will look into this and call us back. They may just want to cancel this.

## 2021-11-30 ENCOUNTER — HOSPITAL ENCOUNTER (EMERGENCY)
Age: 71
Discharge: HOME | End: 2021-12-01
Attending: STUDENT IN AN ORGANIZED HEALTH CARE EDUCATION/TRAINING PROGRAM
Payer: MEDICARE

## 2021-11-30 ENCOUNTER — APPOINTMENT (OUTPATIENT)
Dept: GENERAL RADIOLOGY | Age: 71
End: 2021-11-30
Payer: MEDICARE

## 2021-11-30 ENCOUNTER — APPOINTMENT (OUTPATIENT)
Dept: CT IMAGING | Age: 71
End: 2021-11-30
Payer: MEDICARE

## 2021-11-30 VITALS
TEMPERATURE: 97.7 F | BODY MASS INDEX: 45 KG/M2 | SYSTOLIC BLOOD PRESSURE: 106 MMHG | HEART RATE: 74 BPM | RESPIRATION RATE: 18 BRPM | OXYGEN SATURATION: 97 % | HEIGHT: 66 IN | WEIGHT: 280 LBS | DIASTOLIC BLOOD PRESSURE: 52 MMHG

## 2021-11-30 DIAGNOSIS — M25.559 HIP PAIN: ICD-10-CM
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
## 2021-11-30 PROCEDURE — 73502 X-RAY EXAM HIP UNI 2-3 VIEWS: CPT

## 2021-11-30 PROCEDURE — 70450 CT HEAD/BRAIN W/O DYE: CPT

## 2021-11-30 PROCEDURE — 71045 X-RAY EXAM CHEST 1 VIEW: CPT

## 2021-11-30 PROCEDURE — 72125 CT NECK SPINE W/O DYE: CPT

## 2021-11-30 PROCEDURE — 99284 EMERGENCY DEPT VISIT MOD MDM: CPT

## 2021-11-30 ASSESSMENT — PAIN DESCRIPTION - LOCATION: LOCATION: HIP

## 2021-11-30 ASSESSMENT — PAIN SCALES - GENERAL: PAINLEVEL_OUTOF10: 10

## 2021-11-30 ASSESSMENT — PAIN DESCRIPTION - PAIN TYPE: TYPE: ACUTE PAIN

## 2021-11-30 ASSESSMENT — PAIN DESCRIPTION - ORIENTATION: ORIENTATION: RIGHT

## 2021-12-01 NOTE — ED PROVIDER NOTES
Department of Emergency Medicine   ED  Provider Note  Admit Date/RoomTime: 11/30/2021  7:00 PM  ED Room: [place] [address]          History of Present Illness:  11/30/21, Time: 9:00 PM EST  Chief Complaint   Patient presents with   [facility] resident, fall out of bed. no head injury, no loss of consciousness    Hip Pain     right hip pain now. no shortening, slight outward rotation noted        Michael Vigil is a 66 y.o. male presenting to the ED for fall and hip pain, beginning today. The complaint has been persistent, moderate in severity, and worsened by movement. The patient is a 78-year-old male with a history of arthritis who presents to the emergency department via EMS from [place] complaining of a fall. The patient is unable to provide much history due to his history of dementia. I did contact [address] to obtain further history. Apparently the patient fell out of bed and is now complaining of right-sided hip pain. He describes the pain as an aching sensation and nonradiating. It has been persistent, moderate in severity, and nothing makes it better. It is worsened with certain movements. Patient does not know if he hit his head in triage note states patient did not hit his head or experience any loss of consciousness, but this is unknown. The patient denies any blood thinner use, lightheadedness, dizziness, syncope, chest pain, shortness of breath, other injuries, unilateral weakness, numbness, tingling, recent hospitalization, recent illness, or other acute symptoms or concerns.     Review of Systems:   A complete review of systems was performed and pertinent positives and negatives are stated within HPI, all other systems reviewed and are negative.        --------------------------------------------- PAST HISTORY ---------------------------------------------  Past Medical History:  has a past medical history of Arthritis, Asthma, Bilateral carotid artery stenosis, CAD (coronary artery disease), COPD (chronic obstructive pulmonary disease) ([facility] [place] [address] [facility] Marcus's gangrene, History of tobacco use, Hyperlipidemia, Hypothyroidism, DEMARCUS (obstructive sleep apnea), Pneumonia, and Stroke [facility] Past Surgical History:  has a past surgical history that includes Cardiac surgery; Coronary angioplasty with stent (12/2011); knee surgery; other surgical history (Left, 2/11/2015); other surgical history (3/2/15); other surgical history (Left, 3/2/2015); and eye surgery (Right, 8-). Social History:  reports that he quit smoking about a year ago. His smoking use included cigarettes. He started smoking about 45 years ago. He has a 22.00 pack-year smoking history. He has never used smokeless tobacco. He reports that he does not drink alcohol and does not use drugs. Family History: family history includes Cancer in his mother; Heart Disease in his father. . Unless otherwise noted, family history is non contributory    The patients home medications have been reviewed. Allergies: Patient has no known allergies. I have reviewed the past medical history, past surgical history, social history, and family history    ---------------------------------------------------PHYSICAL EXAM--------------------------------------    Constitutional/General: Alert and oriented x3, pale and chronically ill-appearing but no acute distress  Head: Normocephalic and atraumatic  Eyes:  EOMI, sclera non icteric  ENT: Oropharynx clear, handling secretions, no trismus, no asymmetry of the posterior oropharynx or uvular edema  Neck: Supple, full ROM, no stridor, no meningeal signs  Respiratory: Lungs clear to auscultation bilaterally, no wheezes, rales, or rhonchi. Not in respiratory distress  Cardiovascular:  Regular rate. Regular rhythm. No murmurs, no gallops, no rubs. 2+ distal pulses. Equal extremity pulses. Gastrointestinal:  Abdomen Soft, Non tender, Non distended.  No rebound, guarding, or rigidity. No pulsatile masses. Musculoskeletal: Moves all extremities x 4. Warm and well perfused, no clubbing, no cyanosis, no edema. Capillary refill <3 seconds. Mild tenderness to palpation of the right hip. There are no overlying skin changes. No evidence of trauma. Skin: skin warm and dry. No rashes. Neurologic: GCS 15, no focal deficits, symmetric strength 5/5 in the upper and lower extremities bilaterally  Psychiatric: Normal Affect    -------------------------------------------------- RESULTS -------------------------------------------------  I have personally reviewed all laboratory and imaging results for this patient. Results are listed below. LABS: (Lab results interpreted by me)  No results found for this visit on 11/30/21.,       RADIOLOGY:  Interpreted by Radiologist unless otherwise specified  CT HEAD WO CONTRAST   Final Result   No acute intracranial abnormality. 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 CT CERVICAL SPINE WO CONTRAST   Final Result   No acute abnormality of the cervical spine. Degenerative changes, most advanced at C4-C5. XR HIP RIGHT (2-3 VIEWS)   Final Result   No acute abnormality of the hip. XR CHEST 1 VIEW   Final Result   No acute process. ------------------------- NURSING NOTES AND VITALS REVIEWED ---------------------------   The nursing notes within the ED encounter and vital signs as below have been reviewed by myself  BP (!) 106/52   Pulse 74   Temp 97.7 °F (36.5 °C) (Oral)   Resp 18   Ht 5' 6\" (1.676 m)   Wt 280 lb (127 kg)   SpO2 97%   BMI 45.19 kg/m²     Oxygen Saturation Interpretation: Normal    The patients available past medical records and past encounters were reviewed.         ------------------------------ ED COURSE/MEDICAL DECISION MAKING----------------------  Medications - No data to display    I, Dr. Albert Camargo, am the primary provider of record    Medical Decision Making:   The patient is a 68-year-old male presents the emergency department planing of a fall. Patient is hemodynamically stable, nontoxic appearance, no acute distress. There are no focal neurological deficits. Imaging was negative for acute abnormalities. Patient did ambulate with assistance. Did discuss with nursing facility, and he typically ambulates with a walker. Patient was at his baseline mentation. He was discharged back to nursing facility in stable condition. Oxygen Saturation Interpretation: 97 % on room air. Re-Evaluations:  ED Course as of 12/03/21 1545   Tue Nov 30, 2021   2218 Patient is able to ambulate with a walker or wheelchair per [facility]. [KG]   1445 Patient ambulated well in the emergency department with no complaints. He will be discharged back to [facility]      ED Course User Index  [KG] Sancho Marcia,              This patient's ED course included: a personal history and physicial examination, re-evaluation prior to disposition, multiple bedside re-evaluations, IV medications, cardiac monitoring, continuous pulse oximetry and complex medical decision making and emergency management    This patient has remained hemodynamically stable during their ED course. Counseling: The emergency provider has spoken with the patient and discussed todays results, in addition to providing specific details for the plan of care and counseling regarding the diagnosis and prognosis. Questions are answered at this time and they are agreeable with the plan.       --------------------------------- IMPRESSION AND DISPOSITION ---------------------------------    IMPRESSION  1. Fall, initial encounter    2. Hip pain        DISPOSITION  Disposition: Discharge to home  Patient condition is stable        NOTE: This report was transcribed using voice recognition software.  Every effort was made to ensure accuracy; however, inadvertent computerized transcription errors may be present       Cira Lund DO  12/03/21 [id number]

## 2021-12-01 NOTE — ED NOTES
Bed:  [place] [address]  Expected date:   Expected time:   Means of arrival:   Comments:  [place]-  hip pain      Khushi Rice RN  11/30/21 [id number]